# Patient Record
Sex: MALE | Race: WHITE | NOT HISPANIC OR LATINO | Employment: OTHER | URBAN - METROPOLITAN AREA
[De-identification: names, ages, dates, MRNs, and addresses within clinical notes are randomized per-mention and may not be internally consistent; named-entity substitution may affect disease eponyms.]

---

## 2019-11-27 ENCOUNTER — APPOINTMENT (OUTPATIENT)
Dept: RADIOLOGY | Facility: MEDICAL CENTER | Age: 79
DRG: 480 | End: 2019-11-27
Attending: EMERGENCY MEDICINE
Payer: MEDICARE

## 2019-11-27 ENCOUNTER — HOSPITAL ENCOUNTER (INPATIENT)
Facility: MEDICAL CENTER | Age: 79
LOS: 23 days | DRG: 480 | End: 2019-12-20
Attending: EMERGENCY MEDICINE | Admitting: HOSPITALIST
Payer: MEDICARE

## 2019-11-27 DIAGNOSIS — N40.0 BENIGN PROSTATIC HYPERPLASIA, UNSPECIFIED WHETHER LOWER URINARY TRACT SYMPTOMS PRESENT: ICD-10-CM

## 2019-11-27 DIAGNOSIS — I10 ESSENTIAL HYPERTENSION: ICD-10-CM

## 2019-11-27 DIAGNOSIS — D62 ACUTE BLOOD LOSS ANEMIA: ICD-10-CM

## 2019-11-27 DIAGNOSIS — G93.40 ENCEPHALOPATHY ACUTE: ICD-10-CM

## 2019-11-27 DIAGNOSIS — R33.9 URINARY RETENTION: ICD-10-CM

## 2019-11-27 DIAGNOSIS — S72.001A CLOSED FRACTURE OF NECK OF RIGHT FEMUR, INITIAL ENCOUNTER (HCC): ICD-10-CM

## 2019-11-27 PROBLEM — S72.90XA FEMUR FRACTURE (HCC): Status: ACTIVE | Noted: 2019-11-27

## 2019-11-27 PROBLEM — E11.9 DM (DIABETES MELLITUS) (HCC): Status: ACTIVE | Noted: 2019-11-27

## 2019-11-27 LAB
ALBUMIN SERPL BCP-MCNC: 3.6 G/DL (ref 3.2–4.9)
ALBUMIN/GLOB SERPL: 1.2 G/DL
ALP SERPL-CCNC: 60 U/L (ref 30–99)
ALT SERPL-CCNC: 29 U/L (ref 2–50)
ANION GAP SERPL CALC-SCNC: 11 MMOL/L (ref 0–11.9)
AST SERPL-CCNC: 27 U/L (ref 12–45)
BASOPHILS # BLD AUTO: 0.3 % (ref 0–1.8)
BASOPHILS # BLD: 0.02 K/UL (ref 0–0.12)
BILIRUB SERPL-MCNC: 0.6 MG/DL (ref 0.1–1.5)
BUN SERPL-MCNC: 19 MG/DL (ref 8–22)
CALCIUM SERPL-MCNC: 8.5 MG/DL (ref 8.5–10.5)
CHLORIDE SERPL-SCNC: 106 MMOL/L (ref 96–112)
CO2 SERPL-SCNC: 23 MMOL/L (ref 20–33)
CREAT SERPL-MCNC: 1.09 MG/DL (ref 0.5–1.4)
EKG IMPRESSION: NORMAL
EOSINOPHIL # BLD AUTO: 0.18 K/UL (ref 0–0.51)
EOSINOPHIL NFR BLD: 2.8 % (ref 0–6.9)
ERYTHROCYTE [DISTWIDTH] IN BLOOD BY AUTOMATED COUNT: 42.7 FL (ref 35.9–50)
GLOBULIN SER CALC-MCNC: 3.1 G/DL (ref 1.9–3.5)
GLUCOSE BLD-MCNC: 212 MG/DL (ref 65–99)
GLUCOSE SERPL-MCNC: 246 MG/DL (ref 65–99)
HCT VFR BLD AUTO: 46.6 % (ref 42–52)
HGB BLD-MCNC: 15.8 G/DL (ref 14–18)
IMM GRANULOCYTES # BLD AUTO: 0.01 K/UL (ref 0–0.11)
IMM GRANULOCYTES NFR BLD AUTO: 0.2 % (ref 0–0.9)
LYMPHOCYTES # BLD AUTO: 2.4 K/UL (ref 1–4.8)
LYMPHOCYTES NFR BLD: 37.6 % (ref 22–41)
MCH RBC QN AUTO: 31 PG (ref 27–33)
MCHC RBC AUTO-ENTMCNC: 33.9 G/DL (ref 33.7–35.3)
MCV RBC AUTO: 91.6 FL (ref 81.4–97.8)
MONOCYTES # BLD AUTO: 0.7 K/UL (ref 0–0.85)
MONOCYTES NFR BLD AUTO: 11 % (ref 0–13.4)
NEUTROPHILS # BLD AUTO: 3.07 K/UL (ref 1.82–7.42)
NEUTROPHILS NFR BLD: 48.1 % (ref 44–72)
NRBC # BLD AUTO: 0 K/UL
NRBC BLD-RTO: 0 /100 WBC
PLATELET # BLD AUTO: 176 K/UL (ref 164–446)
PMV BLD AUTO: 11.7 FL (ref 9–12.9)
POTASSIUM SERPL-SCNC: 3.9 MMOL/L (ref 3.6–5.5)
PROT SERPL-MCNC: 6.7 G/DL (ref 6–8.2)
RBC # BLD AUTO: 5.09 M/UL (ref 4.7–6.1)
SODIUM SERPL-SCNC: 140 MMOL/L (ref 135–145)
WBC # BLD AUTO: 6.4 K/UL (ref 4.8–10.8)

## 2019-11-27 PROCEDURE — 93005 ELECTROCARDIOGRAM TRACING: CPT | Performed by: EMERGENCY MEDICINE

## 2019-11-27 PROCEDURE — 73502 X-RAY EXAM HIP UNI 2-3 VIEWS: CPT | Mod: LT

## 2019-11-27 PROCEDURE — 700102 HCHG RX REV CODE 250 W/ 637 OVERRIDE(OP): Performed by: HOSPITALIST

## 2019-11-27 PROCEDURE — 80053 COMPREHEN METABOLIC PANEL: CPT

## 2019-11-27 PROCEDURE — 302830 HCHG BUCKS UNIVERSAL TRACTION BOOT

## 2019-11-27 PROCEDURE — 71045 X-RAY EXAM CHEST 1 VIEW: CPT

## 2019-11-27 PROCEDURE — 770006 HCHG ROOM/CARE - MED/SURG/GYN SEMI*

## 2019-11-27 PROCEDURE — 700111 HCHG RX REV CODE 636 W/ 250 OVERRIDE (IP): Performed by: HOSPITALIST

## 2019-11-27 PROCEDURE — 96374 THER/PROPH/DIAG INJ IV PUSH: CPT

## 2019-11-27 PROCEDURE — 99285 EMERGENCY DEPT VISIT HI MDM: CPT

## 2019-11-27 PROCEDURE — 99223 1ST HOSP IP/OBS HIGH 75: CPT | Mod: AI | Performed by: HOSPITALIST

## 2019-11-27 PROCEDURE — 85025 COMPLETE CBC W/AUTO DIFF WBC: CPT

## 2019-11-27 PROCEDURE — A9270 NON-COVERED ITEM OR SERVICE: HCPCS | Performed by: HOSPITALIST

## 2019-11-27 PROCEDURE — 700111 HCHG RX REV CODE 636 W/ 250 OVERRIDE (IP): Performed by: EMERGENCY MEDICINE

## 2019-11-27 PROCEDURE — 96375 TX/PRO/DX INJ NEW DRUG ADDON: CPT

## 2019-11-27 PROCEDURE — 73551 X-RAY EXAM OF FEMUR 1: CPT | Mod: LT

## 2019-11-27 PROCEDURE — 82962 GLUCOSE BLOOD TEST: CPT

## 2019-11-27 RX ORDER — BISACODYL 10 MG
10 SUPPOSITORY, RECTAL RECTAL
Status: DISCONTINUED | OUTPATIENT
Start: 2019-11-27 | End: 2019-12-20 | Stop reason: HOSPADM

## 2019-11-27 RX ORDER — HYDROMORPHONE HYDROCHLORIDE 1 MG/ML
0.5 INJECTION, SOLUTION INTRAMUSCULAR; INTRAVENOUS; SUBCUTANEOUS
Status: DISCONTINUED | OUTPATIENT
Start: 2019-11-27 | End: 2019-12-03

## 2019-11-27 RX ORDER — ONDANSETRON 4 MG/1
4 TABLET, ORALLY DISINTEGRATING ORAL EVERY 4 HOURS PRN
Status: DISCONTINUED | OUTPATIENT
Start: 2019-11-27 | End: 2019-12-20 | Stop reason: HOSPADM

## 2019-11-27 RX ORDER — ONDANSETRON 2 MG/ML
4 INJECTION INTRAMUSCULAR; INTRAVENOUS EVERY 4 HOURS PRN
Status: DISCONTINUED | OUTPATIENT
Start: 2019-11-27 | End: 2019-12-20 | Stop reason: HOSPADM

## 2019-11-27 RX ORDER — POLYETHYLENE GLYCOL 3350 17 G/17G
1 POWDER, FOR SOLUTION ORAL
Status: DISCONTINUED | OUTPATIENT
Start: 2019-11-27 | End: 2019-12-20 | Stop reason: HOSPADM

## 2019-11-27 RX ORDER — AMOXICILLIN 250 MG
2 CAPSULE ORAL 2 TIMES DAILY
Status: DISCONTINUED | OUTPATIENT
Start: 2019-11-27 | End: 2019-12-20 | Stop reason: HOSPADM

## 2019-11-27 RX ORDER — OXYCODONE HYDROCHLORIDE 10 MG/1
10 TABLET ORAL
Status: DISCONTINUED | OUTPATIENT
Start: 2019-11-27 | End: 2019-12-03

## 2019-11-27 RX ORDER — MORPHINE SULFATE 4 MG/ML
4 INJECTION, SOLUTION INTRAMUSCULAR; INTRAVENOUS ONCE
Status: COMPLETED | OUTPATIENT
Start: 2019-11-27 | End: 2019-11-27

## 2019-11-27 RX ORDER — HYDRALAZINE HYDROCHLORIDE 20 MG/ML
10 INJECTION INTRAMUSCULAR; INTRAVENOUS EVERY 6 HOURS PRN
Status: DISCONTINUED | OUTPATIENT
Start: 2019-11-27 | End: 2019-12-20 | Stop reason: HOSPADM

## 2019-11-27 RX ORDER — OXYCODONE HYDROCHLORIDE 5 MG/1
5 TABLET ORAL
Status: DISCONTINUED | OUTPATIENT
Start: 2019-11-27 | End: 2019-12-03

## 2019-11-27 RX ORDER — ONDANSETRON 2 MG/ML
4 INJECTION INTRAMUSCULAR; INTRAVENOUS ONCE
Status: COMPLETED | OUTPATIENT
Start: 2019-11-27 | End: 2019-11-27

## 2019-11-27 RX ADMIN — INSULIN HUMAN 2 UNITS: 100 INJECTION, SOLUTION PARENTERAL at 21:00

## 2019-11-27 RX ADMIN — ONDANSETRON 4 MG: 2 INJECTION INTRAMUSCULAR; INTRAVENOUS at 18:16

## 2019-11-27 RX ADMIN — OXYCODONE HYDROCHLORIDE 10 MG: 10 TABLET ORAL at 20:57

## 2019-11-27 RX ADMIN — HYDROMORPHONE HYDROCHLORIDE 0.5 MG: 1 INJECTION, SOLUTION INTRAMUSCULAR; INTRAVENOUS; SUBCUTANEOUS at 20:00

## 2019-11-27 RX ADMIN — MORPHINE SULFATE 4 MG: 4 INJECTION INTRAVENOUS at 18:16

## 2019-11-27 SDOH — HEALTH STABILITY: MENTAL HEALTH: HOW OFTEN DO YOU HAVE A DRINK CONTAINING ALCOHOL?: NEVER

## 2019-11-27 ASSESSMENT — ENCOUNTER SYMPTOMS
EYE DISCHARGE: 0
BRUISES/BLEEDS EASILY: 0
MYALGIAS: 0
ABDOMINAL PAIN: 0
DIZZINESS: 0
HEMOPTYSIS: 0
DOUBLE VISION: 0
BACK PAIN: 1
SPEECH CHANGE: 0
FLANK PAIN: 0
PALPITATIONS: 0
HEARTBURN: 0
FALLS: 1
BLURRED VISION: 0
HALLUCINATIONS: 0
DEPRESSION: 0
SHORTNESS OF BREATH: 0
FOCAL WEAKNESS: 0
SENSORY CHANGE: 0
CHILLS: 0
NAUSEA: 0
VOMITING: 0
COUGH: 0
FEVER: 0
WEAKNESS: 0

## 2019-11-27 ASSESSMENT — LIFESTYLE VARIABLES: SUBSTANCE_ABUSE: 0

## 2019-11-28 ENCOUNTER — ANESTHESIA EVENT (OUTPATIENT)
Dept: SURGERY | Facility: MEDICAL CENTER | Age: 79
DRG: 480 | End: 2019-11-28
Payer: MEDICARE

## 2019-11-28 ENCOUNTER — APPOINTMENT (OUTPATIENT)
Dept: RADIOLOGY | Facility: MEDICAL CENTER | Age: 79
DRG: 480 | End: 2019-11-28
Attending: ORTHOPAEDIC SURGERY
Payer: MEDICARE

## 2019-11-28 ENCOUNTER — ANESTHESIA (OUTPATIENT)
Dept: SURGERY | Facility: MEDICAL CENTER | Age: 79
DRG: 480 | End: 2019-11-28
Payer: MEDICARE

## 2019-11-28 PROBLEM — I10 DIABETES MELLITUS WITH COINCIDENT HYPERTENSION (HCC): Status: ACTIVE | Noted: 2019-11-27

## 2019-11-28 LAB
ALBUMIN SERPL BCP-MCNC: 3.5 G/DL (ref 3.2–4.9)
ALBUMIN/GLOB SERPL: 1.3 G/DL
ALP SERPL-CCNC: 57 U/L (ref 30–99)
ALT SERPL-CCNC: 28 U/L (ref 2–50)
ANION GAP SERPL CALC-SCNC: 6 MMOL/L (ref 0–11.9)
AST SERPL-CCNC: 25 U/L (ref 12–45)
BASOPHILS # BLD AUTO: 0.2 % (ref 0–1.8)
BASOPHILS # BLD: 0.02 K/UL (ref 0–0.12)
BILIRUB SERPL-MCNC: 0.9 MG/DL (ref 0.1–1.5)
BUN SERPL-MCNC: 20 MG/DL (ref 8–22)
CALCIUM SERPL-MCNC: 8.6 MG/DL (ref 8.5–10.5)
CHLORIDE SERPL-SCNC: 107 MMOL/L (ref 96–112)
CO2 SERPL-SCNC: 27 MMOL/L (ref 20–33)
CREAT SERPL-MCNC: 0.99 MG/DL (ref 0.5–1.4)
EOSINOPHIL # BLD AUTO: 0.01 K/UL (ref 0–0.51)
EOSINOPHIL NFR BLD: 0.1 % (ref 0–6.9)
ERYTHROCYTE [DISTWIDTH] IN BLOOD BY AUTOMATED COUNT: 42.6 FL (ref 35.9–50)
GLOBULIN SER CALC-MCNC: 2.8 G/DL (ref 1.9–3.5)
GLUCOSE BLD-MCNC: 132 MG/DL (ref 65–99)
GLUCOSE BLD-MCNC: 159 MG/DL (ref 65–99)
GLUCOSE BLD-MCNC: 171 MG/DL (ref 65–99)
GLUCOSE BLD-MCNC: 194 MG/DL (ref 65–99)
GLUCOSE BLD-MCNC: 222 MG/DL (ref 65–99)
GLUCOSE SERPL-MCNC: 131 MG/DL (ref 65–99)
HCT VFR BLD AUTO: 43.6 % (ref 42–52)
HGB BLD-MCNC: 14.3 G/DL (ref 14–18)
IMM GRANULOCYTES # BLD AUTO: 0.02 K/UL (ref 0–0.11)
IMM GRANULOCYTES NFR BLD AUTO: 0.2 % (ref 0–0.9)
LYMPHOCYTES # BLD AUTO: 1.36 K/UL (ref 1–4.8)
LYMPHOCYTES NFR BLD: 14.8 % (ref 22–41)
MCH RBC QN AUTO: 30.3 PG (ref 27–33)
MCHC RBC AUTO-ENTMCNC: 32.8 G/DL (ref 33.7–35.3)
MCV RBC AUTO: 92.4 FL (ref 81.4–97.8)
MONOCYTES # BLD AUTO: 1.21 K/UL (ref 0–0.85)
MONOCYTES NFR BLD AUTO: 13.2 % (ref 0–13.4)
NEUTROPHILS # BLD AUTO: 6.56 K/UL (ref 1.82–7.42)
NEUTROPHILS NFR BLD: 71.5 % (ref 44–72)
NRBC # BLD AUTO: 0 K/UL
NRBC BLD-RTO: 0 /100 WBC
PLATELET # BLD AUTO: 162 K/UL (ref 164–446)
PMV BLD AUTO: 11.4 FL (ref 9–12.9)
POTASSIUM SERPL-SCNC: 4.5 MMOL/L (ref 3.6–5.5)
PROT SERPL-MCNC: 6.3 G/DL (ref 6–8.2)
RBC # BLD AUTO: 4.72 M/UL (ref 4.7–6.1)
SODIUM SERPL-SCNC: 140 MMOL/L (ref 135–145)
WBC # BLD AUTO: 9.2 K/UL (ref 4.8–10.8)

## 2019-11-28 PROCEDURE — 700105 HCHG RX REV CODE 258

## 2019-11-28 PROCEDURE — 770006 HCHG ROOM/CARE - MED/SURG/GYN SEMI*

## 2019-11-28 PROCEDURE — 500891 HCHG PACK, ORTHO MAJOR: Performed by: ORTHOPAEDIC SURGERY

## 2019-11-28 PROCEDURE — 700111 HCHG RX REV CODE 636 W/ 250 OVERRIDE (IP): Performed by: ANESTHESIOLOGY

## 2019-11-28 PROCEDURE — 700105 HCHG RX REV CODE 258: Performed by: ANESTHESIOLOGY

## 2019-11-28 PROCEDURE — C1769 GUIDE WIRE: HCPCS | Performed by: ORTHOPAEDIC SURGERY

## 2019-11-28 PROCEDURE — C1713 ANCHOR/SCREW BN/BN,TIS/BN: HCPCS | Performed by: ORTHOPAEDIC SURGERY

## 2019-11-28 PROCEDURE — 160035 HCHG PACU - 1ST 60 MINS PHASE I: Performed by: ORTHOPAEDIC SURGERY

## 2019-11-28 PROCEDURE — 51798 US URINE CAPACITY MEASURE: CPT

## 2019-11-28 PROCEDURE — 700111 HCHG RX REV CODE 636 W/ 250 OVERRIDE (IP): Performed by: HOSPITALIST

## 2019-11-28 PROCEDURE — 160041 HCHG SURGERY MINUTES - EA ADDL 1 MIN LEVEL 4: Performed by: ORTHOPAEDIC SURGERY

## 2019-11-28 PROCEDURE — 36415 COLL VENOUS BLD VENIPUNCTURE: CPT

## 2019-11-28 PROCEDURE — 160002 HCHG RECOVERY MINUTES (STAT): Performed by: ORTHOPAEDIC SURGERY

## 2019-11-28 PROCEDURE — 160029 HCHG SURGERY MINUTES - 1ST 30 MINS LEVEL 4: Performed by: ORTHOPAEDIC SURGERY

## 2019-11-28 PROCEDURE — 160036 HCHG PACU - EA ADDL 30 MINS PHASE I: Performed by: ORTHOPAEDIC SURGERY

## 2019-11-28 PROCEDURE — 99233 SBSQ HOSP IP/OBS HIGH 50: CPT | Performed by: INTERNAL MEDICINE

## 2019-11-28 PROCEDURE — 80053 COMPREHEN METABOLIC PANEL: CPT

## 2019-11-28 PROCEDURE — 700102 HCHG RX REV CODE 250 W/ 637 OVERRIDE(OP): Performed by: ANESTHESIOLOGY

## 2019-11-28 PROCEDURE — A6402 STERILE GAUZE <= 16 SQ IN: HCPCS | Performed by: ORTHOPAEDIC SURGERY

## 2019-11-28 PROCEDURE — 73552 X-RAY EXAM OF FEMUR 2/>: CPT | Mod: LT

## 2019-11-28 PROCEDURE — 160009 HCHG ANES TIME/MIN: Performed by: ORTHOPAEDIC SURGERY

## 2019-11-28 PROCEDURE — 501445 HCHG STAPLER, SKIN DISP: Performed by: ORTHOPAEDIC SURGERY

## 2019-11-28 PROCEDURE — A9270 NON-COVERED ITEM OR SERVICE: HCPCS | Performed by: HOSPITALIST

## 2019-11-28 PROCEDURE — 160048 HCHG OR STATISTICAL LEVEL 1-5: Performed by: ORTHOPAEDIC SURGERY

## 2019-11-28 PROCEDURE — 700101 HCHG RX REV CODE 250: Performed by: ANESTHESIOLOGY

## 2019-11-28 PROCEDURE — 700102 HCHG RX REV CODE 250 W/ 637 OVERRIDE(OP): Performed by: HOSPITALIST

## 2019-11-28 PROCEDURE — 0QS706Z REPOSITION LEFT UPPER FEMUR WITH INTRAMEDULLARY INTERNAL FIXATION DEVICE, OPEN APPROACH: ICD-10-PCS | Performed by: ORTHOPAEDIC SURGERY

## 2019-11-28 PROCEDURE — 700102 HCHG RX REV CODE 250 W/ 637 OVERRIDE(OP): Performed by: INTERNAL MEDICINE

## 2019-11-28 PROCEDURE — 501838 HCHG SUTURE GENERAL: Performed by: ORTHOPAEDIC SURGERY

## 2019-11-28 PROCEDURE — 110371 HCHG SHELL REV 272: Performed by: ORTHOPAEDIC SURGERY

## 2019-11-28 PROCEDURE — 700111 HCHG RX REV CODE 636 W/ 250 OVERRIDE (IP): Performed by: ORTHOPAEDIC SURGERY

## 2019-11-28 PROCEDURE — A9270 NON-COVERED ITEM OR SERVICE: HCPCS | Performed by: ANESTHESIOLOGY

## 2019-11-28 PROCEDURE — 82962 GLUCOSE BLOOD TEST: CPT | Mod: 91

## 2019-11-28 PROCEDURE — 85025 COMPLETE CBC W/AUTO DIFF WBC: CPT

## 2019-11-28 DEVICE — SCREW TRIGEN LOW PROFILE 5.0MM X 42.5MM: Type: IMPLANTABLE DEVICE | Site: FEMUR | Status: FUNCTIONAL

## 2019-11-28 DEVICE — IMPLANTABLE DEVICE: Type: IMPLANTABLE DEVICE | Site: FEMUR | Status: FUNCTIONAL

## 2019-11-28 DEVICE — SCREW TRIGEN LOW PROFILE 5.0MM X 47.5MM: Type: IMPLANTABLE DEVICE | Site: FEMUR | Status: FUNCTIONAL

## 2019-11-28 RX ORDER — SODIUM CHLORIDE 9 MG/ML
INJECTION, SOLUTION INTRAVENOUS
Status: COMPLETED
Start: 2019-11-28 | End: 2019-11-28

## 2019-11-28 RX ORDER — ONDANSETRON 2 MG/ML
4 INJECTION INTRAMUSCULAR; INTRAVENOUS
Status: DISCONTINUED | OUTPATIENT
Start: 2019-11-28 | End: 2019-11-28 | Stop reason: HOSPADM

## 2019-11-28 RX ORDER — HYDROMORPHONE HYDROCHLORIDE 1 MG/ML
0.4 INJECTION, SOLUTION INTRAMUSCULAR; INTRAVENOUS; SUBCUTANEOUS
Status: DISCONTINUED | OUTPATIENT
Start: 2019-11-28 | End: 2019-11-28 | Stop reason: HOSPADM

## 2019-11-28 RX ORDER — SODIUM CHLORIDE, SODIUM LACTATE, POTASSIUM CHLORIDE, CALCIUM CHLORIDE 600; 310; 30; 20 MG/100ML; MG/100ML; MG/100ML; MG/100ML
INJECTION, SOLUTION INTRAVENOUS CONTINUOUS
Status: DISCONTINUED | OUTPATIENT
Start: 2019-11-28 | End: 2019-11-28 | Stop reason: HOSPADM

## 2019-11-28 RX ORDER — DIPHENHYDRAMINE HYDROCHLORIDE 50 MG/ML
12.5 INJECTION INTRAMUSCULAR; INTRAVENOUS
Status: DISCONTINUED | OUTPATIENT
Start: 2019-11-28 | End: 2019-11-28 | Stop reason: HOSPADM

## 2019-11-28 RX ORDER — CEFAZOLIN SODIUM 1 G/3ML
INJECTION, POWDER, FOR SOLUTION INTRAMUSCULAR; INTRAVENOUS PRN
Status: DISCONTINUED | OUTPATIENT
Start: 2019-11-28 | End: 2019-11-28 | Stop reason: SURG

## 2019-11-28 RX ORDER — MIDAZOLAM HYDROCHLORIDE 1 MG/ML
INJECTION INTRAMUSCULAR; INTRAVENOUS PRN
Status: DISCONTINUED | OUTPATIENT
Start: 2019-11-28 | End: 2019-11-28 | Stop reason: SURG

## 2019-11-28 RX ORDER — OXYCODONE HCL 5 MG/5 ML
10 SOLUTION, ORAL ORAL
Status: COMPLETED | OUTPATIENT
Start: 2019-11-28 | End: 2019-11-28

## 2019-11-28 RX ORDER — LABETALOL HYDROCHLORIDE 5 MG/ML
5 INJECTION, SOLUTION INTRAVENOUS
Status: DISCONTINUED | OUTPATIENT
Start: 2019-11-28 | End: 2019-11-28 | Stop reason: HOSPADM

## 2019-11-28 RX ORDER — SODIUM CHLORIDE, SODIUM LACTATE, POTASSIUM CHLORIDE, CALCIUM CHLORIDE 600; 310; 30; 20 MG/100ML; MG/100ML; MG/100ML; MG/100ML
INJECTION, SOLUTION INTRAVENOUS
Status: DISCONTINUED | OUTPATIENT
Start: 2019-11-28 | End: 2019-11-28 | Stop reason: SURG

## 2019-11-28 RX ORDER — HALOPERIDOL 5 MG/ML
1 INJECTION INTRAMUSCULAR
Status: DISCONTINUED | OUTPATIENT
Start: 2019-11-28 | End: 2019-11-28 | Stop reason: HOSPADM

## 2019-11-28 RX ORDER — ROCURONIUM BROMIDE 10 MG/ML
INJECTION, SOLUTION INTRAVENOUS PRN
Status: DISCONTINUED | OUTPATIENT
Start: 2019-11-28 | End: 2019-11-28 | Stop reason: SURG

## 2019-11-28 RX ORDER — CEFAZOLIN SODIUM 1 G/50ML
1 INJECTION, SOLUTION INTRAVENOUS EVERY 8 HOURS
Status: COMPLETED | OUTPATIENT
Start: 2019-11-28 | End: 2019-11-29

## 2019-11-28 RX ORDER — OXYCODONE HCL 5 MG/5 ML
5 SOLUTION, ORAL ORAL
Status: COMPLETED | OUTPATIENT
Start: 2019-11-28 | End: 2019-11-28

## 2019-11-28 RX ORDER — PHENYLEPHRINE HYDROCHLORIDE 10 MG/ML
INJECTION, SOLUTION INTRAMUSCULAR; INTRAVENOUS; SUBCUTANEOUS PRN
Status: DISCONTINUED | OUTPATIENT
Start: 2019-11-28 | End: 2019-11-28 | Stop reason: HOSPADM

## 2019-11-28 RX ORDER — LIDOCAINE HYDROCHLORIDE 20 MG/ML
INJECTION, SOLUTION EPIDURAL; INFILTRATION; INTRACAUDAL; PERINEURAL PRN
Status: DISCONTINUED | OUTPATIENT
Start: 2019-11-28 | End: 2019-11-28 | Stop reason: SURG

## 2019-11-28 RX ORDER — HYDROMORPHONE HYDROCHLORIDE 1 MG/ML
0.1 INJECTION, SOLUTION INTRAMUSCULAR; INTRAVENOUS; SUBCUTANEOUS
Status: DISCONTINUED | OUTPATIENT
Start: 2019-11-28 | End: 2019-11-28 | Stop reason: HOSPADM

## 2019-11-28 RX ORDER — HYDROMORPHONE HYDROCHLORIDE 1 MG/ML
0.2 INJECTION, SOLUTION INTRAMUSCULAR; INTRAVENOUS; SUBCUTANEOUS
Status: DISCONTINUED | OUTPATIENT
Start: 2019-11-28 | End: 2019-11-28 | Stop reason: HOSPADM

## 2019-11-28 RX ADMIN — PROPOFOL 80 MG: 10 INJECTION, EMULSION INTRAVENOUS at 07:37

## 2019-11-28 RX ADMIN — ONDANSETRON 4 MG: 2 INJECTION INTRAMUSCULAR; INTRAVENOUS at 07:37

## 2019-11-28 RX ADMIN — INSULIN HUMAN 1 UNITS: 100 INJECTION, SOLUTION PARENTERAL at 21:47

## 2019-11-28 RX ADMIN — CEFAZOLIN SODIUM 1 G: 1 INJECTION, SOLUTION INTRAVENOUS at 21:39

## 2019-11-28 RX ADMIN — CEFAZOLIN SODIUM 1 G: 1 INJECTION, SOLUTION INTRAVENOUS at 16:34

## 2019-11-28 RX ADMIN — MIDAZOLAM 2 MG: 1 INJECTION INTRAMUSCULAR; INTRAVENOUS at 07:37

## 2019-11-28 RX ADMIN — INSULIN HUMAN 1 UNITS: 100 INJECTION, SOLUTION PARENTERAL at 13:26

## 2019-11-28 RX ADMIN — SODIUM CHLORIDE, POTASSIUM CHLORIDE, SODIUM LACTATE AND CALCIUM CHLORIDE: 600; 310; 30; 20 INJECTION, SOLUTION INTRAVENOUS at 08:51

## 2019-11-28 RX ADMIN — INSULIN HUMAN 1 UNITS: 100 INJECTION, SOLUTION PARENTERAL at 01:42

## 2019-11-28 RX ADMIN — CEFAZOLIN 2 G: 330 INJECTION, POWDER, FOR SOLUTION INTRAMUSCULAR; INTRAVENOUS at 07:37

## 2019-11-28 RX ADMIN — SODIUM CHLORIDE 500 ML: 9 INJECTION, SOLUTION INTRAVENOUS at 21:45

## 2019-11-28 RX ADMIN — FENTANYL CITRATE 25 MCG: 0.05 INJECTION, SOLUTION INTRAMUSCULAR; INTRAVENOUS at 09:11

## 2019-11-28 RX ADMIN — ROCURONIUM BROMIDE 40 MG: 10 INJECTION, SOLUTION INTRAVENOUS at 07:37

## 2019-11-28 RX ADMIN — SODIUM CHLORIDE, POTASSIUM CHLORIDE, SODIUM LACTATE AND CALCIUM CHLORIDE: 600; 310; 30; 20 INJECTION, SOLUTION INTRAVENOUS at 07:30

## 2019-11-28 RX ADMIN — OXYCODONE HYDROCHLORIDE 10 MG: 10 TABLET ORAL at 13:25

## 2019-11-28 RX ADMIN — PHENYLEPHRINE HYDROCHLORIDE 100 MCG: 10 INJECTION INTRAVENOUS at 07:52

## 2019-11-28 RX ADMIN — FENTANYL CITRATE 25 MCG: 0.05 INJECTION, SOLUTION INTRAMUSCULAR; INTRAVENOUS at 09:26

## 2019-11-28 RX ADMIN — LIDOCAINE HYDROCHLORIDE 100 MG: 20 INJECTION, SOLUTION EPIDURAL; INFILTRATION; INTRACAUDAL at 07:37

## 2019-11-28 RX ADMIN — SUGAMMADEX 200 MG: 100 INJECTION, SOLUTION INTRAVENOUS at 08:51

## 2019-11-28 RX ADMIN — HYDROMORPHONE HYDROCHLORIDE 0.2 MG: 1 INJECTION, SOLUTION INTRAMUSCULAR; INTRAVENOUS; SUBCUTANEOUS at 09:45

## 2019-11-28 RX ADMIN — FENTANYL CITRATE 100 MCG: 50 INJECTION, SOLUTION INTRAMUSCULAR; INTRAVENOUS at 07:37

## 2019-11-28 RX ADMIN — SENNOSIDES AND DOCUSATE SODIUM 2 TABLET: 8.6; 5 TABLET ORAL at 18:42

## 2019-11-28 RX ADMIN — FENTANYL CITRATE 50 MCG: 50 INJECTION, SOLUTION INTRAMUSCULAR; INTRAVENOUS at 09:36

## 2019-11-28 RX ADMIN — OXYCODONE HYDROCHLORIDE 5 MG: 5 SOLUTION ORAL at 09:15

## 2019-11-28 RX ADMIN — INSULIN HUMAN 2 UNITS: 100 INJECTION, SOLUTION PARENTERAL at 18:42

## 2019-11-28 RX ADMIN — EPHEDRINE SULFATE 25 MG: 50 INJECTION, SOLUTION INTRAVENOUS at 07:48

## 2019-11-28 RX ADMIN — EPHEDRINE SULFATE 10 MG: 50 INJECTION, SOLUTION INTRAVENOUS at 07:45

## 2019-11-28 RX ADMIN — SODIUM CHLORIDE, POTASSIUM CHLORIDE, SODIUM LACTATE AND CALCIUM CHLORIDE: 600; 310; 30; 20 INJECTION, SOLUTION INTRAVENOUS at 09:30

## 2019-11-28 ASSESSMENT — ENCOUNTER SYMPTOMS
EYE PAIN: 0
DIARRHEA: 0
SEIZURES: 0
FEVER: 0
ORTHOPNEA: 0
EYE DISCHARGE: 0
NECK PAIN: 0
CHILLS: 0
HEADACHES: 0
DIZZINESS: 0
ABDOMINAL PAIN: 0
FALLS: 1
SHORTNESS OF BREATH: 0
INSOMNIA: 0
BACK PAIN: 0
STRIDOR: 0
PALPITATIONS: 0
VOMITING: 0
NERVOUS/ANXIOUS: 0
WEIGHT LOSS: 0
SPUTUM PRODUCTION: 0
COUGH: 0
FOCAL WEAKNESS: 0
EYE REDNESS: 0
BLURRED VISION: 0
MYALGIAS: 0
DEPRESSION: 0
HEARTBURN: 0
NAUSEA: 0

## 2019-11-28 ASSESSMENT — PAIN SCALES - GENERAL: PAIN_LEVEL: 3

## 2019-11-28 ASSESSMENT — COGNITIVE AND FUNCTIONAL STATUS - GENERAL
TOILETING: A LOT
MOVING TO AND FROM BED TO CHAIR: UNABLE
TURNING FROM BACK TO SIDE WHILE IN FLAT BAD: UNABLE
DRESSING REGULAR UPPER BODY CLOTHING: A LITTLE
WALKING IN HOSPITAL ROOM: TOTAL
MOBILITY SCORE: 6
MOVING FROM LYING ON BACK TO SITTING ON SIDE OF FLAT BED: UNABLE
DRESSING REGULAR LOWER BODY CLOTHING: TOTAL
CLIMB 3 TO 5 STEPS WITH RAILING: TOTAL
HELP NEEDED FOR BATHING: TOTAL
SUGGESTED CMS G CODE MODIFIER DAILY ACTIVITY: CK
SUGGESTED CMS G CODE MODIFIER MOBILITY: CN
STANDING UP FROM CHAIR USING ARMS: TOTAL
DAILY ACTIVITIY SCORE: 15

## 2019-11-28 ASSESSMENT — PATIENT HEALTH QUESTIONNAIRE - PHQ9
SUM OF ALL RESPONSES TO PHQ9 QUESTIONS 1 AND 2: 0
2. FEELING DOWN, DEPRESSED, IRRITABLE, OR HOPELESS: NOT AT ALL
1. LITTLE INTEREST OR PLEASURE IN DOING THINGS: NOT AT ALL

## 2019-11-28 ASSESSMENT — LIFESTYLE VARIABLES
ON A TYPICAL DAY WHEN YOU DRINK ALCOHOL HOW MANY DRINKS DO YOU HAVE: 0
HOW MANY TIMES IN THE PAST YEAR HAVE YOU HAD 5 OR MORE DRINKS IN A DAY: 0
AVERAGE NUMBER OF DAYS PER WEEK YOU HAVE A DRINK CONTAINING ALCOHOL: 0
CONSUMPTION TOTAL: NEGATIVE
TOTAL SCORE: 0
DOES PATIENT WANT TO STOP DRINKING: NO
HAVE PEOPLE ANNOYED YOU BY CRITICIZING YOUR DRINKING: NO
ALCOHOL_USE: NO
EVER FELT BAD OR GUILTY ABOUT YOUR DRINKING: NO
HAVE YOU EVER FELT YOU SHOULD CUT DOWN ON YOUR DRINKING: NO
EVER HAD A DRINK FIRST THING IN THE MORNING TO STEADY YOUR NERVES TO GET RID OF A HANGOVER: NO
TOTAL SCORE: 0
TOTAL SCORE: 0

## 2019-11-28 NOTE — ASSESSMENT & PLAN NOTE
Continue tamsulosin and finasteride   Continue shah   He will need to follow up with urology in [place]

## 2019-11-28 NOTE — ANESTHESIA POSTPROCEDURE EVALUATION
Patient: Nazario Cox    Procedure Summary     Date:  11/28/19 Room / Location:  [place] [address] / SURGERY [facility]    Anesthesia Start:  0730 Anesthesia Stop:  0859    Procedure:  INSERTION, INTRAMEDULLARY SELINA, FEMUR (Left Leg Upper) Diagnosis:  (Left subtrochanteric femur fracture)    Surgeon:  Timoteo Reyes M.D. Responsible Provider:  Rick Blanca M.D.    Anesthesia Type:  general ASA Status:  2          Final Anesthesia Type: general  Last vitals  BP   Blood Pressure : 143/97    Temp   36.2 °C (97.2 °F)    Pulse   Pulse: (!) 101   Resp   (!) 10    SpO2   96 %      Anesthesia Post Evaluation    Patient location during evaluation: PACU  Patient participation: complete - patient participated  Level of consciousness: awake and alert  Pain score: 3    Airway patency: patent  Anesthetic complications: no  Cardiovascular status: hemodynamically stable  Respiratory status: acceptable  Hydration status: euvolemic    PONV: none           Nurse Pain Score: 3 (NPRS)

## 2019-11-28 NOTE — ED NOTES
UNABLE to complete med rec  Pt does not know any of his medications or what pharmacy he uses.  No pharmacy, friends or family in demographics.  Will attempt to re interview or follow up with demographics if they are updated tomorrow 11/27

## 2019-11-28 NOTE — CARE PLAN
Problem: Pain Management  Goal: Pain level will decrease to patient's comfort goal  Outcome: PROGRESSING AS EXPECTED     Problem: Skin Integrity  Goal: Risk for impaired skin integrity will decrease  Outcome: PROGRESSING AS EXPECTED     Pain managed well with Hein's traction. Skin assessed frequently under devices.

## 2019-11-28 NOTE — ANESTHESIA QCDR
2019 [facility] Clinical Data Registry (for Quality Improvement)     Postoperative nausea/vomiting risk protocol (Adult = 18 yrs and Pediatric 3-17 yrs)- (430 and 463)  General inhalation anesthetic (NOT TIVA) with PONV risk factors: No  Provision of anti-emetic therapy with at least 2 different classes of agents: N/A  Patient DID NOT receive anti-emetic therapy and reason is documented in Medical Record: N/A    Multimodal Pain Management- (AQI59)  Patient undergoing Elective Surgery (i.e. Outpatient, or ASC, or Prescheduled Surgery prior to Hospital Admission): No  Use of Multimodal Pain Management, two or more drugs and/or interventions, NOT including systemic opioids: N/A  Exception: Documented allergy to multiple classes of analgesics: N/A    PACU assessment of acute postoperative pain prior to Anesthesia Care End- Applies to Patients Age = 18- (ABG7)  Initial PACU pain score is which of the following: < 7/10  Patient unable to report pain score: N/A    Post-anesthetic transfer of care checklist/protocol to PACU/ICU- (426 and 427)  Upon conclusion of case, patient transferred to which of the following locations: PACU/Non-ICU  Use of transfer checklist/protocol: Yes  Exclusion: Service Performed in Patient Hospital Room (and thus did not require transfer): N/A    PACU Reintubation- (AQI31)  General anesthesia requiring endotracheal intubation (ETT) along with subsequent extubation in OR or PACU: No  Required reintubation in the PACU: N/A  Extubation was a planned trial documented in the medical record prior to removal of the original airway device: N/A    Unplanned admission to ICU related to anesthesia service up through end of PACU care- (MD51)  Unplanned admission to ICU (not initially anticipated at anesthesia start time): No

## 2019-11-28 NOTE — ED PROVIDER NOTES
ED Provider Note    Scribed for Dr. Sanju De La Cruz M.D. by Wendy Chavez. 11/27/2019  6:06 PM    Primary care provider: None noted.  Means of arrival: Wheel-Chair  History obtained from: Patient  History limited by: None    CHIEF COMPLAINT  Chief Complaint   Patient presents with   • Hip Injury     c/o left hip pain   • T-5000 GLF     patient slipped on ice and landed on left hip       HPI  Nazario Cox is a 79 y.o. male who presents to the Emergency Department for evaluation of injuries sustained when he fell on his left hip prior to arrival. Patient notes he was getting out of his car when he slipped on ice and fell on his left hip. He states that when he was unable to get up because his entire left leg was not supporting him. He is currently complaining of moderate left hip pain and left lower extremity pain. He states his pain is exacerbated with movement. No additional pain or symptoms noted at this time.    REVIEW OF SYSTEMS  Pertinent positives include left hip and left lower extremity pain. Pertinent negatives include: No additional pain or symptoms noted at this time.   See HPI for further details.     PAST MEDICAL HISTORY   has a past medical history of Diabetes (HCC) and Hypertension.    SURGICAL HISTORY  patient denies any surgical history    SOCIAL HISTORY  Social History     Tobacco Use   • Smoking status: Current Every Day Smoker     Packs/day: 0.00     Types: Cigarettes   • Smokeless tobacco: Never Used   Substance Use Topics   • Alcohol use: Never     Frequency: Never   • Drug use: Yes     Types: Inhaled     Comment: marijuana      Social History     Substance and Sexual Activity   Drug Use Yes   • Types: Inhaled    Comment: marijuana       FAMILY HISTORY  History reviewed. No pertinent family history.    CURRENT MEDICATIONS  None noted.    ALLERGIES  No reported allergies.     PHYSICAL EXAM  VITAL SIGNS: /86   Pulse 92   Temp 36 °C (96.8 °F) (Temporal)   Resp 16   Wt 74.8 kg (165 lb)    SpO2 92%     Constitutional: Well developed, Well nourished, moderate distress, Non-toxic appearance.   HENT: Normocephalic, Atraumatic, Bilateral external ears normal, Oropharynx moist, No oral exudates.   Eyes: PERRLA, EOMI, Conjunctiva normal, No discharge.   Neck: No tenderness, Supple, No stridor.   Lymphatic: No lymphadenopathy noted.   Cardiovascular: Normal heart rate, Normal rhythm.   Thorax & Lungs: Clear to auscultation bilaterally, No respiratory distress, No wheezing, No crackles.   Abdomen: Soft, No tenderness, No masses, No pulsatile masses.   Skin: Warm, Dry, No erythema, No rash.   Extremities: Shortening of left leg, pain with movement and tenderness at the hip and down to mid thigh. Leg is rotated abnormality.   Musculoskeletal: Intact distal pulses  Neurologic: Awake, alert. Moves all extremities spontaneously.  Psychiatric: Affect normal, Judgment normal, Mood normal.     RADIOLOGY  DX-CHEST-PORTABLE (1 VIEW)   Final Result      No acute cardiopulmonary abnormality. No displaced rib fractures. No pneumothorax.      DX-HIP-COMPLETE - UNILATERAL 2+ LEFT   Final Result      1.  Mild/moderately displaced subtrochanteric proximal left femur fracture.   2.  Degenerative changes of the hips.      DX-FEMUR-1 VIEW LEFT   Final Result      Acute, obliquely oriented fracture of subtrochanteric femoral diaphysis.        The radiologist's interpretation of all radiological studies have been reviewed by me.    COURSE & MEDICAL DECISION MAKING  Pertinent Labs & Imaging studies reviewed. (See chart for details)    6:06 PM - Patient seen and examined at bedside. I informed the patient the need for radiology to rule out any emergent processes. Currently awaiting results before deciding if intervention is necessary. Patient verbalizes understanding and agreement to this plan of care.  Patient will be treated with Zofran injection 4 mg and morphine 4 mg/mL injection 4mg. Ordered DX-femur left to evaluate his  symptoms. The differential diagnoses include but are not limited to: pelvic fracture, intertrochanter hip fracture, and femur fracture    7:18 PM - Paged orthopedics.    7:26 PM - I discussed the patient's case and the above findings with Dr. Reyes (Orthopedics) who agreed to consult the patient.     7:35 PM - Paged Hospitalist.    7:40 PM - I discussed the patient's case and the above findings with Dr. Pacheco (Hospitalist) who agreed to evaluate patient for hospitalization.    Decision Making:  Patient with a subtrochanteric hip fracture with extension down to the proximal femur.  Patient will be discussed with Dr. Reyes to be kept n.p.o. after midnight Hein's traction r requested by traction   dISPOSITION:  Patient will be hospitalized by Dr. Pacheco (Hospitalist) in guarded condition.,    FINAL IMPRESSION  Subtrochanteric hip fracture     Wendy CRUMP (Shu), am scribing for, and in the presence of, Sanju De La Cruz M.D..    Electronically signed by: Wendy Chavez (Shu), 11/27/2019    ISanju M.D. personally performed the services described in this documentation, as scribed by Wendy Chavez in my presence, and it is both accurate and complete. E.    The note accurately reflects work and decisions made by me.  Sanju De La Cruz  11/27/2019  9:47 PM

## 2019-11-28 NOTE — ED TRIAGE NOTES
.Nazario Cox  .  Chief Complaint   Patient presents with   • Hip Injury     c/o left hip pain   • T-5000 GLF     patient slipped on ice and landed on left hip     Patient to triage with above complaint. Left foot appears rotated. Charge RN aware and patient to green 24.

## 2019-11-28 NOTE — ANESTHESIA PREPROCEDURE EVALUATION
Relevant Problems   CARDIAC   (+) HTN (hypertension)       Physical Exam    Airway   Mallampati: II  TM distance: >3 FB  Neck ROM: full       Cardiovascular - normal exam  Rhythm: regular  Rate: normal  (-) murmur     Dental - normal exam  (+) upper dentures, lower dentures         Pulmonary - normal exam  Breath sounds clear to auscultation     Abdominal    Neurological - normal exam               Anesthesia Plan    ASA 2       Plan - general       Airway plan will be ETT        Induction: intravenous    Postoperative Plan: Postoperative administration of opioids is intended.    Pertinent diagnostic labs and testing reviewed    Informed Consent:    Anesthetic plan and risks discussed with patient.    Use of blood products discussed with: patient whom consented to blood products.

## 2019-11-28 NOTE — PROGRESS NOTES
Hospital Medicine Daily Progress Note    Date of Service  11/28/2019    Chief Complaint  79 y.o. male admitted 11/27/2019 with GLF, hip pain    Hospital Course    79 y.o. male who presented 11/27/2019 with past medical history of diabetes, hypertension who presents with left hip pain and found to have femromal fracture.      Interval Problem Update  POD 0. Having left hip pain at surgical site. PT/OT. Pain control.  Patient was seen and examined by me today. Case was discussed with RN and multidisplinary team during rounds. Denies nausea, vomiting, diarrhea.       Consultants/Specialty  ortho    Code Status  fuoll    Disposition  Remain on the floor    Review of Systems  Review of Systems   Constitutional: Negative for chills, fever and weight loss.   HENT: Negative for congestion and nosebleeds.    Eyes: Negative for blurred vision, pain, discharge and redness.   Respiratory: Negative for cough, sputum production, shortness of breath and stridor.    Cardiovascular: Negative for chest pain, palpitations and orthopnea.   Gastrointestinal: Negative for abdominal pain, diarrhea, heartburn, nausea and vomiting.   Genitourinary: Negative for dysuria, frequency and urgency.   Musculoskeletal: Positive for falls and joint pain. Negative for back pain, myalgias and neck pain.        Left hip pain   Skin: Negative for itching and rash.   Neurological: Negative for dizziness, focal weakness, seizures and headaches.   Psychiatric/Behavioral: Negative for depression. The patient is not nervous/anxious and does not have insomnia.         Physical Exam  Temp:  [36 °C (96.8 °F)-36.8 °C (98.3 °F)] 36.8 °C (98.3 °F)  Pulse:  [58-92] 83  Resp:  [16-18] 16  BP: ()/(63-97) 142/93  SpO2:  [92 %-99 %] 96 %    Physical Exam  Vitals signs reviewed.   Constitutional:       General: He is not in acute distress.     Appearance: Normal appearance.   HENT:      Head: Normocephalic and atraumatic.      Nose: No congestion or rhinorrhea.    Eyes:      Extraocular Movements: Extraocular movements intact.      Pupils: Pupils are equal, round, and reactive to light.   Neck:      Musculoskeletal: Normal range of motion and neck supple.   Cardiovascular:      Rate and Rhythm: Normal rate and regular rhythm.      Pulses: Normal pulses.   Pulmonary:      Effort: Pulmonary effort is normal. No respiratory distress.      Breath sounds: Normal breath sounds.   Abdominal:      General: Bowel sounds are normal. There is no distension.      Palpations: Abdomen is soft.      Tenderness: There is no tenderness.   Musculoskeletal:         General: Tenderness present. No swelling.      Comments: Left hip pain   Skin:     General: Skin is warm.      Findings: No erythema.   Neurological:      General: No focal deficit present.      Mental Status: He is alert and oriented to person, place, and time.         Fluids    Intake/Output Summary (Last 24 hours) at 11/28/2019 0840  Last data filed at 11/28/2019 0500  Gross per 24 hour   Intake --   Output 380 ml   Net -380 ml       Laboratory  Recent Labs     11/27/19  1815 11/28/19  0450   WBC 6.4 9.2   RBC 5.09 4.72   HEMOGLOBIN 15.8 14.3   HEMATOCRIT 46.6 43.6   MCV 91.6 92.4   MCH 31.0 30.3   MCHC 33.9 32.8*   RDW 42.7 42.6   PLATELETCT 176 162*   MPV 11.7 11.4     Recent Labs     11/27/19  1815 11/28/19  0450   SODIUM 140 140   POTASSIUM 3.9 4.5   CHLORIDE 106 107   CO2 23 27   GLUCOSE 246* 131*   BUN 19 20   CREATININE 1.09 0.99   CALCIUM 8.5 8.6                   Imaging  DX-FEMUR-2+ LEFT   Final Result      Intraoperative evaluation of femur fracture fixation.      DX-PORTABLE FLUORO > 1 HOUR   Final Result      Intraoperative evaluation of femur fracture fixation.      DX-CHEST-PORTABLE (1 VIEW)   Final Result      No acute cardiopulmonary abnormality. No displaced rib fractures. No pneumothorax.      DX-HIP-COMPLETE - UNILATERAL 2+ LEFT   Final Result      1.  Mild/moderately displaced subtrochanteric proximal left  femur fracture.   2.  Degenerative changes of the hips.      DX-FEMUR-1 VIEW LEFT   Final Result      Acute, obliquely oriented fracture of subtrochanteric femoral diaphysis.           Assessment/Plan  * Femur fracture (HCC)  Assessment & Plan  Left sided subtrochanteric femur fracture   POD 0 IMN  Cont with pain control   Pt/ot eval as appropriate        Marijuana use, continuous- (present on admission)  Assessment & Plan  Daily use     BPH (benign prostatic hyperplasia)  Assessment & Plan  Unknown home medications, cont to monitor for now prn bladder scan as appropriate    Diabetes mellitus with coincident hypertension (HCC)  Assessment & Plan  With associated hyperglycemia and uncontrolled  SSI ordered   accu checks      Essential hypertension  Assessment & Plan  Unknown home medications at this time   Prn hydralazine ordered        VTE prophylaxis: lovenox

## 2019-11-28 NOTE — PROGRESS NOTES
2 RN Skin Check    2 RN skin check complete.   Devices in place: Nasal Cannula, Hein's Traction.  Skin assessed under devices: Yes.  Confirmed pressure ulcers found on: N/A.  New potential pressure ulcers noted on N/A. Wound consult placed N/A.  The following interventions in place Pillows.    Patient with obvious deformity to left thigh, swollen but skin intact. Heels and sacrum intact, no redness or open wounds. No current skin concerns.

## 2019-11-28 NOTE — ASSESSMENT & PLAN NOTE
Pt has no diabetes. He has prediabetes. a1c 5.7   He is 80 y/o. no need for metformin which has more side effects instead of benefits   Discontinue accu-check  Diet controled

## 2019-11-28 NOTE — ANESTHESIA TIME REPORT
Anesthesia Start and Stop Event Times     Date Time Event    11/28/2019 0717 Ready for Procedure     0730 Anesthesia Start     0859 Anesthesia Stop        Responsible Staff  11/28/19    Name Role Begin End    Rick Blanca M.D. Anesth 0730 0859        Preop Diagnosis (Free Text):  Pre-op Diagnosis     Left subtrochanteric femur fracture        Preop Diagnosis (Codes):    Post op Diagnosis  Femur fracture (HCC)      Premium Reason  F. Holiday    Comments:

## 2019-11-28 NOTE — ANESTHESIA PROCEDURE NOTES
Airway  Date/Time: 11/28/2019 7:38 AM  Performed by: Rick Blanca M.D.  Authorized by: Rick Blanca M.D.     Location:  OR  Urgency:  Elective  Indications for Airway Management:  Anesthesia  Spontaneous Ventilation: absent    Sedation Level:  Deep  Preoxygenated: Yes    Patient Position:  Sniffing  Final Airway Type:  Endotracheal airway  Final Endotracheal Airway:  ETT  Cuffed: Yes    Technique Used for Successful ETT Placement:  Direct laryngoscopy  Insertion Site:  Oral  Blade Type:  Hernadez  Laryngoscope Blade/Videolaryngoscope Blade Size:  2  ETT Size (mm):  8.0  Measured from:  Lips  ETT to Lips (cm):  23  Placement Verified by: auscultation and capnometry    Cormack-Lehane Classification:  Grade I - full view of glottis  Number of Attempts at Approach:  1

## 2019-11-28 NOTE — PROGRESS NOTES
Additional 5lbs applied to pt's bucks traction. Any further questions or assistance please [place] at [phone].

## 2019-11-28 NOTE — OP REPORT
DATE OF SERVICE:  11/28/2019    PREOPERATIVE DIAGNOSIS:  Left subtrochanteric femur fracture, closed,   displaced, spiral.    POSTOPERATIVE DIAGNOSIS:  Left subtrochanteric femur fracture, closed,   displaced, spiral.    SURGICAL PROCEDURES:  Intramedullary nailing of left femur.    SURGEON:  Timoteo Reyes MD    ASSISTANT:  Kamran Carroll PA-C    ANESTHESIOLOGIST:  Rick Blanca MD    ANESTHETIC:  General.    ESTIMATED BLOOD LOSS:  250 mL    INDICATIONS:  The patient is 79 years old, was traveling through [place]   yesterday, went to the gas station, slipped on some ice and fell injuring his   left hip.  He sustained a spiral displaced left subtrochanteric femur fracture   and was admitted to the hospitalist service.  I recommended intramedullary   nailing.  Risks include bleeding, infection, neurovascular injury, pain,   stiffness, nonunion, malunion, fracture, arthritis, thromboembolic phenomena,   anesthetic and medical complications, etc.    DESCRIPTION OF PROCEDURE:  The patient was identified in the preoperative   holding area.  His left leg was marked.  He was taken to the operating room   where general anesthetic was administered.  Intravenous antibiotics were   given.  He was placed supine on the fracture table.  Feet were padded and   placed within the boots.  Traction and internal rotation were applied to left   lower extremity.  Fluoroscopic images showed nearly 100% posterior   displacement of the shaft.  The lateral hip, thigh, and knee were then prepped   and draped in sterile fashion.  Time-out was held to confirm the patient   identity and correct surgical site.    I made a longitudinal incision along the lateral aspect of the thigh, carried   this down to the IT band, which was split longitudinally.  Then, I split the   vastus lateralis fascia.  I encountered a fairly large hematoma related to the   fracture.  I then elevated the vastus lateralis to expose the fracture   partially.  I  then used 2 large clamps to reduce the fracture and hold it.    Visibly and fluoroscopically, it was near anatomic.    I then inserted a terminally threaded guidewire to the tip of the greater   trochanter and then inserted this into the proximal femur with the appropriate   starting angle.  Incision was made around the wire and then the channel   reamer was used over the wire down to the level of lesser trochanter.  I then   passed the ball tip guide ankit down the medullary canal and measured the   length.  I reamed up to 13 mm where there was a little bit of contact.  I then   inserted a [employer] 380x11.5 mm Intertan nail (125 degrees).  The   guidewire was inserted through the interlocking sleeve into the near   center-center position of the femoral head.  Then, the cannulated drill was   used over this and then a 95 mm lag screw was inserted.  The setscrew was   tightened.  The jig was removed.  Fluoroscopic images showed good implant   placement proximally.  I left the clamps in place.  I then locked the nail   distally with 2 interlocking screws after stab incision and predrilling.    Then, the clamps were removed.  Fluoroscopic images showed good reduction and   implant placement.  Wounds were irrigated.  The IT band was closed with #1   Vicryl.  The skin incisions were closed with 2-0 Vicryl and staples.    Dressings were applied.  The patient's legs were taken out of the fracture   attachments.  We noted approximately equal leg lengths and symmetric rotation.    He was then extubated and taken to the recovery room in stable condition.    POSTOPERATIVE PLAN:  1.  Intravenous antibiotics for 24 hours.  2.  DVT prophylaxis with SCDs, Lovenox, and early mobilization.  3.  Weightbearing as tolerated.  4.  Ongoing preoperative medical management per hospitalist.  5.  Follow up with local orthopedic surgeon in 10-14 days for wound check,   baseline radiographs, and staple removal or in [place] if the patient  is still   here.       ____________________________________     MD POPPY KELLY / FEDERICO    DD:  11/28/2019 09:05:14  DT:  11/28/2019 09:19:16    D#:  [id number]  Job#:  [id number]

## 2019-11-28 NOTE — OR NURSING
Report called to RNPayal. All pertinent events, medical information and care plan details reported to receiving RN. Reviewed lines and drains including PIV & fluids received in PACU. Reviewed hemodynamics, allergies, code status, applicable medications including pain medications given, and pertinent assessment findings including LLE neuromuscular and surgical site assessment. Surgical dressing of xeroform, 4x4 and medipore tape reviewed with receiving RN. Dressing demonstrates scant spots of sanguinous drainage length of dressing. All questions and concerns addressed. Patient remains HDS on 2L NC, AOx3 at this time. Pt tolerating PO liquids. Patient educated on next phase of care.    This RN unable to update family. No family listed in demographics & no number on yellow surgical family sheet.

## 2019-11-28 NOTE — PROGRESS NOTES
Per Dr. Reyes and Dr. De La Cruz, 5lbs bucks traction has been applied to pt's L LE. Any further questions or assistance please call ortho tech at [phone].

## 2019-11-28 NOTE — ASSESSMENT & PLAN NOTE
Unknown home medications at this time \  BP stable  Prn hydralazine ordered   Doing well without med.   Continue to monitor

## 2019-11-28 NOTE — H&P
Hospital Medicine History & Physical Note    Date of Service  11/27/2019    Primary Care Physician  No primary care provider on file.    Consultants  ortho    Code Status  full    Chief Complaint  GLF, hip pain     History of Presenting Illness  79 y.o. male who presented 11/27/2019 with past medical history of diabetes, hypertension who presents with left hip pain.  This patient was getting out of his car he slipped on ice and landed on his left hip.  He was unable to bear weight on his left side due to significant amount of pain.  His pain is worse with any movement.  Alleviated by rest.  He has moderate hip pain at the time of examination.  In the emergency department is found to have a femoral fracture and will be admitted to the hospital for further management of his symptoms.  Otherwise he has no known alleviating or exacerbating factors to his symptoms.    Review of Systems  Review of Systems   Constitutional: Positive for malaise/fatigue. Negative for chills and fever.   HENT: Negative for congestion, hearing loss and tinnitus.    Eyes: Negative for blurred vision, double vision and discharge.   Respiratory: Negative for cough, hemoptysis and shortness of breath.    Cardiovascular: Negative for chest pain, palpitations and leg swelling.   Gastrointestinal: Negative for abdominal pain, heartburn, nausea and vomiting.   Genitourinary: Negative for dysuria and flank pain.   Musculoskeletal: Positive for back pain, falls and joint pain. Negative for myalgias.   Skin: Negative for rash.   Neurological: Negative for dizziness, sensory change, speech change, focal weakness and weakness.   Endo/Heme/Allergies: Negative for environmental allergies. Does not bruise/bleed easily.   Psychiatric/Behavioral: Negative for depression, hallucinations and substance abuse.       Past Medical History   has a past medical history of Diabetes (HCC) and Hypertension.    Surgical History  Reviewed and not pertinent     Family  History  Reviewed and not pertinent      Social History   reports that he has been smoking cigarettes. He has been smoking about 0.00 packs per day. He has never used smokeless tobacco. He reports current drug use. Drug: Inhaled. He reports that he does not drink alcohol.    Allergies  Not on File    Medications  None       Physical Exam  Temp:  [36 °C (96.8 °F)] 36 °C (96.8 °F)  Pulse:  [58-92] 78  Resp:  [16] 16  BP: ()/(63-86) 125/77  SpO2:  [92 %-98 %] 94 %    Physical Exam  Vitals signs reviewed.   Constitutional:       General: He is in acute distress.      Appearance: He is not ill-appearing.   HENT:      Head: Normocephalic and atraumatic.      Nose: No congestion.      Mouth/Throat:      Mouth: Mucous membranes are moist.   Eyes:      Extraocular Movements: Extraocular movements intact.      Pupils: Pupils are equal, round, and reactive to light.   Neck:      Musculoskeletal: Neck supple.   Cardiovascular:      Rate and Rhythm: Normal rate and regular rhythm.      Pulses: Normal pulses.      Heart sounds: Normal heart sounds.   Pulmonary:      Effort: Pulmonary effort is normal. No respiratory distress.      Breath sounds: Normal breath sounds. No wheezing.   Abdominal:      General: Bowel sounds are normal. There is no distension.      Palpations: Abdomen is soft.      Tenderness: There is no tenderness.   Musculoskeletal:         General: Swelling present.      Comments: Left hip edema, short and externally rotated   Skin:     General: Skin is warm and dry.      Capillary Refill: Capillary refill takes less than 2 seconds.   Neurological:      General: No focal deficit present.      Mental Status: He is alert and oriented to person, place, and time. Mental status is at baseline.   Psychiatric:         Mood and Affect: Mood normal.         Behavior: Behavior normal.         Thought Content: Thought content normal.         Judgment: Judgment normal.         Laboratory:  Recent Labs     11/27/19  0790    WBC 6.4   RBC 5.09   HEMOGLOBIN 15.8   HEMATOCRIT 46.6   MCV 91.6   MCH 31.0   MCHC 33.9   RDW 42.7   PLATELETCT 176   MPV 11.7     Recent Labs     11/27/19  1815   CO2 23   GLUCOSE 246*   BUN 19   CREATININE 1.09   CALCIUM 8.5     Recent Labs     11/27/19  1815   ALTSGPT 29   ASTSGOT 27   ALKPHOSPHAT 60   TBILIRUBIN 0.6   GLUCOSE 246*         No results for input(s): NTPROBNP in the last 72 hours.      No results for input(s): TROPONINT in the last 72 hours.    Urinalysis:    No results found     Imaging:  DX-HIP-COMPLETE - UNILATERAL 2+ LEFT   Final Result      1.  Mild/moderately displaced subtrochanteric proximal left femur fracture.   2.  Degenerative changes of the hips.      DX-FEMUR-1 VIEW LEFT   Final Result      Acute, obliquely oriented fracture of subtrochanteric femoral diaphysis.      DX-CHEST-PORTABLE (1 VIEW)    (Results Pending)         Assessment/Plan:  I anticipate this patient will require at least two midnights for appropriate medical management, necessitating inpatient admission.    * Femur fracture (HCC)  Assessment & Plan  Left sided subtrochanteric femur fracture   Cont with pain control   Pt/ot eval as appropriate  Ortho has been consulted for eval   NPO after midnight    Marijuana use, continuous- (present on admission)  Assessment & Plan  Daily use     BPH (benign prostatic hyperplasia)  Assessment & Plan  Unknown home medications, cont to monitor for now prn bladder scan as appropriate    DM (diabetes mellitus) (HCC)  Assessment & Plan  With associated hyperglycemia and uncontrolled  SSI ordered   accu checks      HTN (hypertension)  Assessment & Plan  Unknown home medications at this time   Prn hydralazine ordered       VTE prophylaxis: scd

## 2019-11-28 NOTE — ASSESSMENT & PLAN NOTE
Left sided subtrochanteric femur fracture   Cont with pain control   Pt/ot eval as appropriate  SNF to [place]   Family willing to provide transportation   Ok to provide pain medications if pt is in significant pain   12/5- primary care in [place] will try to arrange SNF . Continue PT/OT whenever possible while inpatient     12/10 encourage activity, pain control  Appears comfortable  [facility], patient states his friend Erik can help with transport    12/11 requiring 1 person + assist for transfers  Plan for transfer to [place], when accepted    12/12 staples removed, PT OT with min max assist  Pending transfer to skilled nursing facility in [place]  We will need to coordinate with family regarding transfer   assisting    12/14 improving activity tolerance  Family to assist, running to pick patient up if able to provide appropriate level of assistance at home  Versus transfer to skilled nursing facility in [place]    12/15 intermittent pain, postop day #17  Encourage activity  Family to be educated regarding therapy needs and assistance  Needs 24/7 supervision  On oxycodone  Add Motrin as needed  Has lidocaine patch    12/16 postop day #18  Pain controlled  Family planning to come to  patient, therapy to educate family regarding assistance needs   assisting either discharged home with family and 24/7 supervision or transfer to skilled nursing facility in [place] when bed available  12/17- pending transfer to [place]. Pain reasonable well controled. Pt/ot following

## 2019-11-28 NOTE — CARE PLAN
Problem: Communication  Goal: The ability to communicate needs accurately and effectively will improve  Outcome: PROGRESSING AS EXPECTED  Pt uses call light appropriately, can communicate his needs, return from PACU & review plan of care, will continue to monitor     Problem: Infection  Goal: Will remain free from infection  Outcome: PROGRESSING AS EXPECTED  ABX, universal precautions, monitor labs     Problem: Pain Management  Goal: Pain level will decrease to patient's comfort goal  Outcome: PROGRESSING AS EXPECTED  PO meds as needed, pt reporting decent pain relief

## 2019-11-28 NOTE — CONSULTS
DATE OF SERVICE:  11/28/2019    REQUESTING PHYSICIAN:  Sanju De La Cruz MD    CHIEF COMPLAINT:  Left thigh pain.    HISTORY OF PRESENT ILLNESS:  The patient is 79 years old.  He lives in   [place].  He was traveling through with his nephew, slipped on some ice   crystals at the gas station, fell onto his left hip, could not bear weight,   was seen in the emergency room, found to have a subtrochanteric femur   fracture, admitted to the hospitalist service.  Orthopedic consultation was   requested.    ALLERGIES:  None.    MEDICATIONS:  None.    PAST MEDICAL HISTORY:  Diabetes, hypertension.    PAST SURGICAL HISTORY:  None.    SOCIAL HISTORY:  The patient smokes.  He does not drink alcohol.  He does   smoke marijuana as well.    FAMILY HISTORY:  Negative.    REVIEW OF SYSTEMS:  No loss of consciousness, nausea, vomiting, diarrhea,   constipation, polyuria, dysuria, fevers, chills, weight loss, weight gain,   abdominal pain, chest pain or shortness of breath.    PHYSICAL EXAMINATION:  GENERAL:  The patient is in no acute distress, lying on the bed, is in   traction.  VITAL SIGNS:  His blood pressure 153/97, heart rate 76, respirations 16,   temperature 98.1.  HEENT:  Normocephalic, atraumatic.  NECK:  Supple, nontender.  CHEST AND ABDOMEN:  Nontender.  No labored breathing.  EXTREMITIES:  The right lower and bilateral upper extremities without   tenderness or deformity.  Left lower extremity is shortened and externally   rotated.  Skin over the left thigh is intact.    LABORATORY DATA:  Include white blood cell count of 9200, hematocrit 43.6%,   platelet count 162,000.  Sodium 140, potassium 4.5, creatinine 0.99.  AST and   ALT are normal.  Albumin is 3.5.    IMAGING:  Radiographs of the left femur and hip show a spiral subtrochanteric   femur fracture, which is displaced.    ASSESSMENT:  Left subtrochanteric femur fracture, closed, spiral, displaced.    PLAN:  Recommended intramedullary nailing.  Discussed this with  the patient.    Risks include bleeding, infection, neurovascular injury, pain, stiffness,   nonunion, malunion, thromboembolic phenomena, anesthetic and medical   complications, etc.       ____________________________________     MD POPPY KELLY / FEDERICO    DD:  11/28/2019 07:18:15  DT:  11/28/2019 07:41:36    D#:  [id number]  Job#:  [id number]

## 2019-11-29 LAB
GLUCOSE BLD-MCNC: 162 MG/DL (ref 65–99)
GLUCOSE BLD-MCNC: 169 MG/DL (ref 65–99)
GLUCOSE BLD-MCNC: 177 MG/DL (ref 65–99)

## 2019-11-29 PROCEDURE — 99233 SBSQ HOSP IP/OBS HIGH 50: CPT | Performed by: INTERNAL MEDICINE

## 2019-11-29 PROCEDURE — 51798 US URINE CAPACITY MEASURE: CPT

## 2019-11-29 PROCEDURE — 82962 GLUCOSE BLOOD TEST: CPT | Mod: 91

## 2019-11-29 PROCEDURE — A9270 NON-COVERED ITEM OR SERVICE: HCPCS | Performed by: HOSPITALIST

## 2019-11-29 PROCEDURE — 770006 HCHG ROOM/CARE - MED/SURG/GYN SEMI*

## 2019-11-29 PROCEDURE — A9270 NON-COVERED ITEM OR SERVICE: HCPCS | Performed by: PHYSICIAN ASSISTANT

## 2019-11-29 PROCEDURE — 700102 HCHG RX REV CODE 250 W/ 637 OVERRIDE(OP): Performed by: PHYSICIAN ASSISTANT

## 2019-11-29 PROCEDURE — 700102 HCHG RX REV CODE 250 W/ 637 OVERRIDE(OP): Performed by: HOSPITALIST

## 2019-11-29 PROCEDURE — 700111 HCHG RX REV CODE 636 W/ 250 OVERRIDE (IP): Performed by: ORTHOPAEDIC SURGERY

## 2019-11-29 PROCEDURE — A9270 NON-COVERED ITEM OR SERVICE: HCPCS | Performed by: INTERNAL MEDICINE

## 2019-11-29 PROCEDURE — 700102 HCHG RX REV CODE 250 W/ 637 OVERRIDE(OP): Performed by: INTERNAL MEDICINE

## 2019-11-29 RX ORDER — CHOLECALCIFEROL (VITAMIN D3) 50 MCG
2000 TABLET ORAL EVERY MORNING
COMMUNITY

## 2019-11-29 RX ORDER — FINASTERIDE 5 MG/1
5 TABLET, FILM COATED ORAL DAILY
COMMUNITY

## 2019-11-29 RX ORDER — FINASTERIDE 5 MG/1
5 TABLET, FILM COATED ORAL DAILY
Status: DISCONTINUED | OUTPATIENT
Start: 2019-11-29 | End: 2019-12-20 | Stop reason: HOSPADM

## 2019-11-29 RX ORDER — DOXAZOSIN MESYLATE 4 MG/1
4 TABLET ORAL EVERY EVENING
Status: ON HOLD | COMMUNITY
End: 2019-12-19

## 2019-11-29 RX ORDER — GABAPENTIN 300 MG/1
900 CAPSULE ORAL EVERY EVENING
COMMUNITY

## 2019-11-29 RX ORDER — TAMSULOSIN HYDROCHLORIDE 0.4 MG/1
0.4 CAPSULE ORAL
Status: DISCONTINUED | OUTPATIENT
Start: 2019-11-29 | End: 2019-12-20 | Stop reason: HOSPADM

## 2019-11-29 RX ORDER — TAMSULOSIN HYDROCHLORIDE 0.4 MG/1
0.4 CAPSULE ORAL
COMMUNITY

## 2019-11-29 RX ORDER — HYDROCODONE BITARTRATE AND ACETAMINOPHEN 5; 325 MG/1; MG/1
1 TABLET ORAL EVERY 4 HOURS PRN
Status: ON HOLD | COMMUNITY
End: 2019-12-19

## 2019-11-29 RX ADMIN — FINASTERIDE 5 MG: 5 TABLET, FILM COATED ORAL at 18:33

## 2019-11-29 RX ADMIN — CEFAZOLIN SODIUM 1 G: 1 INJECTION, SOLUTION INTRAVENOUS at 05:15

## 2019-11-29 RX ADMIN — SENNOSIDES AND DOCUSATE SODIUM 2 TABLET: 8.6; 5 TABLET ORAL at 05:15

## 2019-11-29 RX ADMIN — ENOXAPARIN SODIUM 40 MG: 100 INJECTION SUBCUTANEOUS at 05:15

## 2019-11-29 RX ADMIN — TAMSULOSIN HYDROCHLORIDE 0.4 MG: 0.4 CAPSULE ORAL at 15:16

## 2019-11-29 RX ADMIN — OXYCODONE HYDROCHLORIDE 5 MG: 5 TABLET ORAL at 05:16

## 2019-11-29 RX ADMIN — INSULIN HUMAN 2 UNITS: 100 INJECTION, SOLUTION PARENTERAL at 08:46

## 2019-11-29 RX ADMIN — OXYCODONE HYDROCHLORIDE 10 MG: 10 TABLET ORAL at 08:45

## 2019-11-29 RX ADMIN — INSULIN HUMAN 1 UNITS: 100 INJECTION, SOLUTION PARENTERAL at 18:30

## 2019-11-29 RX ADMIN — OXYCODONE HYDROCHLORIDE 10 MG: 10 TABLET ORAL at 15:16

## 2019-11-29 RX ADMIN — SENNOSIDES AND DOCUSATE SODIUM 2 TABLET: 8.6; 5 TABLET ORAL at 18:33

## 2019-11-29 ASSESSMENT — ENCOUNTER SYMPTOMS
ABDOMINAL PAIN: 0
SHORTNESS OF BREATH: 0
EYE PAIN: 0
NECK PAIN: 0
DEPRESSION: 0
CHILLS: 0
VOMITING: 0
ORTHOPNEA: 0
DIARRHEA: 0
SEIZURES: 0
SPUTUM PRODUCTION: 0
COUGH: 0
FOCAL WEAKNESS: 0
HEADACHES: 0
INSOMNIA: 0
WEIGHT LOSS: 0
EYE DISCHARGE: 0
FALLS: 1
EYE REDNESS: 0
NAUSEA: 0
STRIDOR: 0
BACK PAIN: 0
NERVOUS/ANXIOUS: 0

## 2019-11-29 NOTE — PROGRESS NOTES
Patient extremely agitated. Complains about constantly being monitored (regarding vitals signs, blood sugar checks, bladder scans.) Patient verbally aggressive towards staff. Adamantly refusing to move in any capacity, will not allow RN and CNA to reposition in bed. Occasionally confused requiring some redirection.

## 2019-11-29 NOTE — DISCHARGE PLANNING
Care Transition Team Assessment  Anticipated Discharge Disposition: Unknown at this time    Action: Met with patient to discuss discharge plans, states he was on vacation from [place] when this happened his nephew, Lance was with him but drove back to [place]. States he lives alone has a cane and does not wear O2 at home. His PCP is Dr. Calvin Art.     Barriers to Discharge: medical clearance and discharge plan    Plan: dc plan unknown at this time    Information Source  Orientation : Oriented x 4  Information Given By: Patient  Informant's Name: Nazario  Who is responsible for making decisions for patient? : Patient    Readmission Evaluation  Is this a readmission?: No    Elopement Risk  Legal Hold: No  Ambulatory or Self Mobile in Wheelchair: No-Not an Elopement Risk  Elopement Risk: Not at Risk for Elopement    Interdisciplinary Discharge Planning  Patient or legal guardian wants to designate a caregiver (see row info): No    Discharge Preparedness  What is your plan after discharge?: Uncertain - pending medical team collaboration  What are your discharge supports?: Child, Sibling  Prior Functional Level: Independent with Activities of Daily Living    Functional Assesment  Prior Functional Level: Independent with Activities of Daily Living    Finances  Financial Barriers to Discharge: No  Prescription Coverage: Yes    Vision / Hearing Impairment  Hearing Impairment: Both Ears              Domestic Abuse  Have you ever been the victim of abuse or violence?: No  Physical Abuse or Sexual Abuse: No  Verbal Abuse or Emotional Abuse: No  Possible Abuse Reported to:: Not Applicable    Psychological Assessment  History of Substance Abuse: Marijuana  History of Psychiatric Problems: No  Non-compliant with Treatment: Yes    Discharge Risks or Barriers  Discharge risks or barriers?: Transportation, Post-acute placement / services, Complex medical needs    Anticipated Discharge Information  Anticipated discharge  disposition: Discharge needs currently unknown  Discharge Address: [address] [place] [address] [address]  Discharge Contact Phone Number: [phone]

## 2019-11-29 NOTE — CARE PLAN
Problem: Knowledge Deficit  Goal: Knowledge of disease process/condition, treatment plan, diagnostic tests, and medications will improve  Outcome: PROGRESSING AS EXPECTED  Goal: Knowledge of the prescribed therapeutic regimen will improve  Outcome: PROGRESSING AS EXPECTED     Problem: Psychosocial Needs:  Goal: Level of anxiety will decrease  Outcome: PROGRESSING AS EXPECTED     Patient agitated, complains about vital signs/blood sugar checks. Patient refuses to move. Patient occasionally confused, reorientation and education provided.

## 2019-11-29 NOTE — PROGRESS NOTES
Per outside records from [facility] and [facility], [facility], and [facility] patient's medication list as follows:    Doxazosin 8 mg at bedtime  Niacin 500 mg daily   Aspirin 81 mg daily  Atorvastatin 20 mg daily  Vitamin D 2000 units daily  Finasteride 5 mg daily  Gabapentin 600 mg three times daily   Norco 5/325 Q4H PRN  Metformin 500 mg daily  Flomax 0.4 mg daily     Unable to verify which medications are still current or last doses taken, as pt is not able to recall what he takes or what pharmacy he uses   Not added to med for for this reason

## 2019-11-29 NOTE — PROGRESS NOTES
"   Orthopaedic PA Progress Note    Interval changes:C/IO urinary retention, requesting either to resume his home meds (list in evita) or cath. Pharm consult requested    ROS - Patient denies any new issues. No chest pain, dyspnea, or fever.  Pain well controlled.    /95   Pulse (!) 101   Temp 36.4 °C (97.6 °F) (Temporal)   Resp 15   Ht 1.702 m (5' 7\")   Wt 74.8 kg (164 lb 14.5 oz)   SpO2 95%     Patient seen and examined  No acute distress  Breathing non labored  RRR  Surgical dressing is clean, dry, and intact. Patient clearly fires tibialis anterior, EHL, and gastrocnemius/soleus. Sensation is intact to light touch throughout superficial peroneal, deep peroneal, tibial, saphenous, and sural nerve distributions. Strong and palpable 2+ dorsalis pedis and posterior tibial pulses with capillary refill less than 2 seconds. No lower leg tenderness or discomfort.    Recent Labs     11/27/19  1815 11/28/19  0450   WBC 6.4 9.2   RBC 5.09 4.72   HEMOGLOBIN 15.8 14.3   HEMATOCRIT 46.6 43.6   MCV 91.6 92.4   MCH 31.0 30.3   MCHC 33.9 32.8*   RDW 42.7 42.6   PLATELETCT 176 162*   MPV 11.7 11.4     Active Hospital Problems    Diagnosis   • Femur fracture (HCC) [S72.90XA]   • Essential hypertension [I10]   • Diabetes mellitus with coincident hypertension (HCC) [E11.9, I10]   • BPH (benign prostatic hyperplasia) [N40.0]   • Marijuana use, continuous [F12.90]     Assessment/Plan:  POD#1 /P L hip IMN  Wt bearing status - AT  PT/OT-initiated  Wound care:dressing left in place  Drains - no  Garibay-no  Sutures/Staples out- 10-14 days post operatively  Antibiotics: complete  DVT Prophylaxis- TEDS/SCDs/Foot pumps.   Lovenox: Start 40 mg daily, Duration-until ambulatory > 150', may transition to ASA BID on discharge  Future Procedures - none planned  Case Coordination for Discharge Planning - Disposition Per Med/PT/OT  Follow-Up: needs appointment with local Orthopedic traumatologist at home ([place], [place]) at  10-14 days " post-op for re-evaluation, staple removal and radiographs.

## 2019-11-29 NOTE — THERAPY
OT referral received. Pt angrily adamantly refused eval despite extensive education on risks of self-imposed bedrest. Will attempt again.

## 2019-11-29 NOTE — CARE PLAN
Problem: Communication  Goal: The ability to communicate needs accurately and effectively will improve  Outcome: PROGRESSING SLOWER THAN EXPECTED  Pt uses call light appropriately, can  communicate his needs, but is aggressive& hostile, review plan of care but is dismissive of plan & degrading to nursing care team     Problem: Fluid Volume:  Goal: Will maintain balanced intake and output  Outcome: PROGRESSING SLOWER THAN EXPECTED  Pt reporting decreased urine output, intake minimal also, getting bladder scan, will continue to monitor     Problem: Psychosocial Needs:  Goal: Level of anxiety will decrease  Outcome: PROGRESSING SLOWER THAN EXPECTED  Pt has become rude, aggressive & disrespectful, attempts to understand concerns are not productive as he's insulting, dismissive, attempts to explain plan of care are ignored, reported to care team

## 2019-11-29 NOTE — PROGRESS NOTES
Pt being verbally aggressive & disrespectful to staff, demanding to see an  & wanting to take his home medication of evin wilhelm. Stating his  heritage gives him rights to take these alternative medications, will contact AP[facility]    Attending updated, added Flomax. Additionally pt refusing Pt/OT care today, refusing ambulation.

## 2019-11-29 NOTE — PROGRESS NOTES
Hospital Medicine Daily Progress Note    Date of Service  11/29/2019    Chief Complaint  79 y.o. male admitted 11/27/2019 with GLF, hip pain    Hospital Course    79 y.o. male who presented 11/27/2019 with past medical history of diabetes, hypertension who presents with left hip pain and found to have femromal fracture.      Interval Problem Update  POD 1. Having left hip pain at surgical site. PT/OT. Pain control. He said he won't be able to do PT/OT because of his pain today.  Patient was seen and examined by me today. Case was discussed with RN and multidisplinary team during rounds. Denies nausea, vomiting, diarrhea.       Consultants/Specialty  ortho    Code Status  fuoll    Disposition  Remain on the floor    Review of Systems  Review of Systems   Constitutional: Negative for chills and weight loss.   HENT: Negative for congestion and nosebleeds.    Eyes: Negative for pain, discharge and redness.   Respiratory: Negative for cough, sputum production, shortness of breath and stridor.    Cardiovascular: Negative for chest pain and orthopnea.   Gastrointestinal: Negative for abdominal pain, diarrhea, nausea and vomiting.   Genitourinary: Negative for frequency and urgency.   Musculoskeletal: Positive for falls and joint pain. Negative for back pain and neck pain.        Left hip pain   Skin: Negative for itching and rash.   Neurological: Negative for focal weakness, seizures and headaches.   Psychiatric/Behavioral: Negative for depression. The patient is not nervous/anxious and does not have insomnia.         Physical Exam  Temp:  [36.1 °C (97 °F)-37 °C (98.6 °F)] 36.4 °C (97.6 °F)  Pulse:  [] 94  Resp:  [11-23] 18  BP: (118-161)/() 123/81  SpO2:  [92 %-100 %] 92 %    Physical Exam  Vitals signs reviewed.   Constitutional:       Appearance: Normal appearance.   HENT:      Head: Normocephalic.      Nose: No congestion.   Eyes:      Extraocular Movements: Extraocular movements intact.      Pupils: Pupils  are equal, round, and reactive to light.   Neck:      Musculoskeletal: Normal range of motion and neck supple.   Cardiovascular:      Rate and Rhythm: Normal rate and regular rhythm.      Pulses: Normal pulses.   Pulmonary:      Effort: Pulmonary effort is normal.      Breath sounds: Normal breath sounds.   Abdominal:      General: Bowel sounds are normal. There is no distension.      Palpations: Abdomen is soft.   Musculoskeletal:         General: Tenderness present. No swelling.      Comments: Left hip pain   Skin:     General: Skin is warm.      Findings: No erythema.   Neurological:      General: No focal deficit present.      Mental Status: He is alert and oriented to person, place, and time.         Fluids    Intake/Output Summary (Last 24 hours) at 11/29/2019 0742  Last data filed at 11/29/2019 0441  Gross per 24 hour   Intake 1380 ml   Output 770 ml   Net 610 ml       Laboratory  Recent Labs     11/27/19 1815 11/28/19  0450   WBC 6.4 9.2   RBC 5.09 4.72   HEMOGLOBIN 15.8 14.3   HEMATOCRIT 46.6 43.6   MCV 91.6 92.4   MCH 31.0 30.3   MCHC 33.9 32.8*   RDW 42.7 42.6   PLATELETCT 176 162*   MPV 11.7 11.4     Recent Labs     11/27/19  1815 11/28/19  0450   SODIUM 140 140   POTASSIUM 3.9 4.5   CHLORIDE 106 107   CO2 23 27   GLUCOSE 246* 131*   BUN 19 20   CREATININE 1.09 0.99   CALCIUM 8.5 8.6                   Imaging  DX-FEMUR-2+ LEFT   Final Result      Intraoperative evaluation of femur fracture fixation.      DX-PORTABLE FLUORO > 1 HOUR   Final Result      Intraoperative evaluation of femur fracture fixation.      DX-CHEST-PORTABLE (1 VIEW)   Final Result      No acute cardiopulmonary abnormality. No displaced rib fractures. No pneumothorax.      DX-HIP-COMPLETE - UNILATERAL 2+ LEFT   Final Result      1.  Mild/moderately displaced subtrochanteric proximal left femur fracture.   2.  Degenerative changes of the hips.      DX-FEMUR-1 VIEW LEFT   Final Result      Acute, obliquely oriented fracture of  subtrochanteric femoral diaphysis.           Assessment/Plan  * Femur fracture (HCC)  Assessment & Plan  Left sided subtrochanteric femur fracture   POD 1 IMN  Cont with pain control   Pt/ot eval as appropriate  Likely will need SNF        Marijuana use, continuous- (present on admission)  Assessment & Plan  Daily use     BPH (benign prostatic hyperplasia)  Assessment & Plan  Unknown home medications, cont to monitor for now prn bladder scan as appropriate    Diabetes mellitus with coincident hypertension (HCC)  Assessment & Plan  With associated hyperglycemia and uncontrolled  SSI ordered   accu checks      Essential hypertension  Assessment & Plan  Unknown home medications at this time \  BP stable  Prn hydralazine ordered        VTE prophylaxis: lovenox

## 2019-11-30 LAB
GLUCOSE BLD-MCNC: 150 MG/DL (ref 65–99)
GLUCOSE BLD-MCNC: 155 MG/DL (ref 65–99)
GLUCOSE BLD-MCNC: 156 MG/DL (ref 65–99)
GLUCOSE BLD-MCNC: 164 MG/DL (ref 65–99)

## 2019-11-30 PROCEDURE — 99232 SBSQ HOSP IP/OBS MODERATE 35: CPT | Performed by: INTERNAL MEDICINE

## 2019-11-30 PROCEDURE — 700111 HCHG RX REV CODE 636 W/ 250 OVERRIDE (IP): Performed by: ORTHOPAEDIC SURGERY

## 2019-11-30 PROCEDURE — 700102 HCHG RX REV CODE 250 W/ 637 OVERRIDE(OP): Performed by: PHYSICIAN ASSISTANT

## 2019-11-30 PROCEDURE — A9270 NON-COVERED ITEM OR SERVICE: HCPCS | Performed by: HOSPITALIST

## 2019-11-30 PROCEDURE — 97162 PT EVAL MOD COMPLEX 30 MIN: CPT

## 2019-11-30 PROCEDURE — 770006 HCHG ROOM/CARE - MED/SURG/GYN SEMI*

## 2019-11-30 PROCEDURE — 97166 OT EVAL MOD COMPLEX 45 MIN: CPT

## 2019-11-30 PROCEDURE — A9270 NON-COVERED ITEM OR SERVICE: HCPCS | Performed by: INTERNAL MEDICINE

## 2019-11-30 PROCEDURE — 82962 GLUCOSE BLOOD TEST: CPT | Mod: 91

## 2019-11-30 PROCEDURE — 700102 HCHG RX REV CODE 250 W/ 637 OVERRIDE(OP): Performed by: HOSPITALIST

## 2019-11-30 PROCEDURE — 700102 HCHG RX REV CODE 250 W/ 637 OVERRIDE(OP): Performed by: INTERNAL MEDICINE

## 2019-11-30 PROCEDURE — A9270 NON-COVERED ITEM OR SERVICE: HCPCS | Performed by: PHYSICIAN ASSISTANT

## 2019-11-30 RX ADMIN — FINASTERIDE 5 MG: 5 TABLET, FILM COATED ORAL at 05:24

## 2019-11-30 RX ADMIN — INSULIN HUMAN 1 UNITS: 100 INJECTION, SOLUTION PARENTERAL at 20:33

## 2019-11-30 RX ADMIN — SENNOSIDES AND DOCUSATE SODIUM 2 TABLET: 8.6; 5 TABLET ORAL at 17:49

## 2019-11-30 RX ADMIN — ENOXAPARIN SODIUM 40 MG: 100 INJECTION SUBCUTANEOUS at 05:25

## 2019-11-30 RX ADMIN — OXYCODONE HYDROCHLORIDE 10 MG: 10 TABLET ORAL at 13:49

## 2019-11-30 RX ADMIN — TAMSULOSIN HYDROCHLORIDE 0.4 MG: 0.4 CAPSULE ORAL at 08:46

## 2019-11-30 RX ADMIN — INSULIN HUMAN 1 UNITS: 100 INJECTION, SOLUTION PARENTERAL at 11:51

## 2019-11-30 RX ADMIN — OXYCODONE HYDROCHLORIDE 10 MG: 10 TABLET ORAL at 05:24

## 2019-11-30 RX ADMIN — SENNOSIDES AND DOCUSATE SODIUM 2 TABLET: 8.6; 5 TABLET ORAL at 05:24

## 2019-11-30 RX ADMIN — INSULIN HUMAN 1 UNITS: 100 INJECTION, SOLUTION PARENTERAL at 17:50

## 2019-11-30 RX ADMIN — OXYCODONE HYDROCHLORIDE 10 MG: 10 TABLET ORAL at 21:40

## 2019-11-30 RX ADMIN — OXYCODONE HYDROCHLORIDE 10 MG: 10 TABLET ORAL at 08:45

## 2019-11-30 ASSESSMENT — ENCOUNTER SYMPTOMS
DIARRHEA: 0
INSOMNIA: 0
STRIDOR: 0
CHILLS: 0
HEARTBURN: 0
ORTHOPNEA: 0
COUGH: 0
DEPRESSION: 0
FALLS: 1
SEIZURES: 0
WEIGHT LOSS: 0
FOCAL WEAKNESS: 0
NERVOUS/ANXIOUS: 0
NECK PAIN: 0
PHOTOPHOBIA: 0
HEADACHES: 0
VOMITING: 0
EYE PAIN: 0
SPUTUM PRODUCTION: 0
ABDOMINAL PAIN: 0
EYE DISCHARGE: 0

## 2019-11-30 ASSESSMENT — COGNITIVE AND FUNCTIONAL STATUS - GENERAL
SUGGESTED CMS G CODE MODIFIER DAILY ACTIVITY: CK
CLIMB 3 TO 5 STEPS WITH RAILING: TOTAL
WALKING IN HOSPITAL ROOM: TOTAL
MOVING TO AND FROM BED TO CHAIR: UNABLE
SUGGESTED CMS G CODE MODIFIER MOBILITY: CN
MOVING FROM LYING ON BACK TO SITTING ON SIDE OF FLAT BED: UNABLE
STANDING UP FROM CHAIR USING ARMS: TOTAL
TOILETING: A LOT
MOBILITY SCORE: 6
TURNING FROM BACK TO SIDE WHILE IN FLAT BAD: UNABLE
DRESSING REGULAR UPPER BODY CLOTHING: A LITTLE
HELP NEEDED FOR BATHING: A LOT
DAILY ACTIVITIY SCORE: 16
PERSONAL GROOMING: A LITTLE
DRESSING REGULAR LOWER BODY CLOTHING: A LOT

## 2019-11-30 ASSESSMENT — ACTIVITIES OF DAILY LIVING (ADL): TOILETING: INDEPENDENT

## 2019-11-30 ASSESSMENT — GAIT ASSESSMENTS: GAIT LEVEL OF ASSIST: REFUSED

## 2019-11-30 NOTE — THERAPY
"Physical Therapy Evaluation completed.   Bed Mobility:  Supine to Sit: Maximal Assist  Transfers: Sit to Stand: Refused  Gait: Level Of Assist: Refused with Front-Wheel Walker       Plan of Care: Will benefit from Physical Therapy 4 times per week  Discharge Recommendations: Equipment: Will Continue to Assess for Equipment Needs. Post-acute therapy Recommend post-acute placement for continued physical therapy services prior to discharge home. Patient can tolerate post-acute therapies at a 5x/week frequency.       See \"Rehab Therapy-Acute\" Patient Summary Report for complete documentation.     "

## 2019-11-30 NOTE — CARE PLAN
"  Problem: Discharge Barriers/Planning  Goal: Patient's continuum of care needs will be met  Outcome: PROGRESSING AS EXPECTED  Attempted to update pt on POC, pt verbalized he just wants to sleep. Pt still needs medical clearance, no set/defined discharge plan at this time, PT/OT needs to eval patient still as he refused earlier on 11/29. Pt is asleep at this time.       Problem: Pain Management  Goal: Pain level will decrease to patient's comfort goal  Outcome: PROGRESSING AS EXPECTED   Reported pain as 3/10 when not moving, \"like a 10\" when moving LLE. Pt verbalized he will call if needing pain medication, denied further needs.  "

## 2019-11-30 NOTE — PROGRESS NOTES
Hospital Medicine Daily Progress Note    Date of Service  11/30/2019    Chief Complaint  79 y.o. male admitted 11/27/2019 with GLF, hip pain    Hospital Course    79 y.o. male who presented 11/27/2019 with past medical history of diabetes, hypertension who presents with left hip pain and found to have femromal fracture.      Interval Problem Update  POD 2.  The patient refused PT and OT yesterday because of extreme pain.  Today his pain is better and he is ready to work with PT and OT.  He asked about how long he see going to be in skilled and I explained to him that it is dependent on him and usually take about 1 to 3 weeks to be at rehabilitation facility.    Patient was seen and examined by me today. Case was discussed with RN and multidisplinary team during rounds. Denies nausea, vomiting, diarrhea.       Consultants/Specialty  ortho    Code Status  fuoll    Disposition  Remain on the floor    Review of Systems  Review of Systems   Constitutional: Negative for chills and weight loss.   HENT: Negative for congestion and nosebleeds.    Eyes: Negative for photophobia, pain and discharge.   Respiratory: Negative for cough, sputum production and stridor.    Cardiovascular: Negative for chest pain and orthopnea.   Gastrointestinal: Negative for abdominal pain, diarrhea, heartburn and vomiting.   Genitourinary: Negative for frequency.   Musculoskeletal: Positive for falls and joint pain. Negative for neck pain.        Left hip pain   Skin: Negative for itching and rash.   Neurological: Negative for focal weakness, seizures and headaches.   Psychiatric/Behavioral: Negative for depression. The patient is not nervous/anxious and does not have insomnia.         Physical Exam  Temp:  [36.4 °C (97.6 °F)-37.2 °C (98.9 °F)] 37.2 °C (98.9 °F)  Pulse:  [9-115] 9  Resp:  [15-18] 18  BP: (106-141)/(71-95) 120/72  SpO2:  [90 %-95 %] 90 %    Physical Exam  Vitals signs reviewed.   Constitutional:       Appearance: Normal appearance.    HENT:      Head: Normocephalic.      Nose: No congestion.   Eyes:      Pupils: Pupils are equal, round, and reactive to light.   Neck:      Musculoskeletal: Normal range of motion and neck supple.   Cardiovascular:      Rate and Rhythm: Normal rate.      Pulses: Normal pulses.   Pulmonary:      Effort: Pulmonary effort is normal.   Abdominal:      General: Bowel sounds are normal. There is no distension.   Musculoskeletal:         General: Tenderness present. No swelling.      Comments: Left hip pain   Skin:     Findings: No erythema.   Neurological:      General: No focal deficit present.      Mental Status: He is alert and oriented to person, place, and time.         Fluids    Intake/Output Summary (Last 24 hours) at 11/30/2019 0737  Last data filed at 11/30/2019 0400  Gross per 24 hour   Intake 236 ml   Output 880 ml   Net -644 ml       Laboratory  Recent Labs     11/27/19 1815 11/28/19  0450   WBC 6.4 9.2   RBC 5.09 4.72   HEMOGLOBIN 15.8 14.3   HEMATOCRIT 46.6 43.6   MCV 91.6 92.4   MCH 31.0 30.3   MCHC 33.9 32.8*   RDW 42.7 42.6   PLATELETCT 176 162*   MPV 11.7 11.4     Recent Labs     11/27/19  1815 11/28/19  0450   SODIUM 140 140   POTASSIUM 3.9 4.5   CHLORIDE 106 107   CO2 23 27   GLUCOSE 246* 131*   BUN 19 20   CREATININE 1.09 0.99   CALCIUM 8.5 8.6                   Imaging  DX-FEMUR-2+ LEFT   Final Result      Intraoperative evaluation of femur fracture fixation.      DX-PORTABLE FLUORO > 1 HOUR   Final Result      Intraoperative evaluation of femur fracture fixation.      DX-CHEST-PORTABLE (1 VIEW)   Final Result      No acute cardiopulmonary abnormality. No displaced rib fractures. No pneumothorax.      DX-HIP-COMPLETE - UNILATERAL 2+ LEFT   Final Result      1.  Mild/moderately displaced subtrochanteric proximal left femur fracture.   2.  Degenerative changes of the hips.      DX-FEMUR-1 VIEW LEFT   Final Result      Acute, obliquely oriented fracture of subtrochanteric femoral diaphysis.            Assessment/Plan  * Femur fracture (HCC)  Assessment & Plan  Left sided subtrochanteric femur fracture   POD 2 IMN  Cont with pain control   Pt/ot eval as appropriate  Likely will need SNF        Marijuana use, continuous- (present on admission)  Assessment & Plan  Daily use     BPH (benign prostatic hyperplasia)  Assessment & Plan  Unknown home medications, cont to monitor for now prn bladder scan as appropriate    Diabetes mellitus with coincident hypertension (HCC)  Assessment & Plan  With associated hyperglycemia and uncontrolled  SSI ordered   accu checks      Essential hypertension  Assessment & Plan  Unknown home medications at this time \  BP stable  Prn hydralazine ordered        VTE prophylaxis: lovenox

## 2019-11-30 NOTE — PROGRESS NOTES
"Pt refused blood glucose check, and insulin at this time, education provided, still refused. Pt verbalized wanting to sleep, \"maybe tomorrow\".  "

## 2019-11-30 NOTE — THERAPY
"Occupational Therapy Evaluation completed.   Functional Status:  Pt presents to skilled OT services following GLF sustaining in Lt hip fracture now post L hip IMN, WBAT. Pt performed bed mobility with max assist d/t pain and pt's resistance, F balance sitting eob, declined any further mobility and ADLs once upright. Pt was educated throughout the session regarding need for mobilization, pain expectations; further reinforcement needed. Pt will benefit from acute skilled OT services while in house and post acute recommended prior to d/c home.   Plan of Care: Will benefit from Occupational Therapy 3 times per week  Discharge Recommendations:  Equipment: Will Continue to Assess for Equipment Needs. Post-acute therapy Recommend post-acute placement for additional occupational therapy services prior to discharge home. Patient can tolerate post-acute therapies at a 5x/week frequency.      See \"Rehab Therapy-Acute\" Patient Summary Report for complete documentation.    "

## 2019-11-30 NOTE — PROGRESS NOTES
Med rec complete per pt and outside records ([facility])   Pt reports he uses the pharmacy at the [facility] in [place], which is closed until Monday  Allergies reviewed - NKDA

## 2019-11-30 NOTE — PROGRESS NOTES
"   Orthopaedic PA Progress Note    Interval changes:Better today, requesting transfer to skilled in [place], [place] with follow-up care locally there    ROS - Patient denies any new issues. No chest pain, dyspnea, or fever.  Pain well controlled.    /69   Pulse (!) 109   Temp 36.5 °C (97.7 °F) (Temporal)   Resp 15   Ht 1.702 m (5' 7\")   Wt 74.8 kg (164 lb 14.5 oz)   SpO2 90%     Patient seen and examined  No acute distress  Breathing non labored  RRR  Surgical dressing is clean, dry, and intact. Patient clearly fires tibialis anterior, EHL, and gastrocnemius/soleus. Sensation is intact to light touch throughout superficial peroneal, deep peroneal, tibial, saphenous, and sural nerve distributions. Strong and palpable 2+ dorsalis pedis and posterior tibial pulses with capillary refill less than 2 seconds. No lower leg tenderness or discomfort.    Recent Labs     11/27/19  1815 11/28/19  0450   WBC 6.4 9.2   RBC 5.09 4.72   HEMOGLOBIN 15.8 14.3   HEMATOCRIT 46.6 43.6   MCV 91.6 92.4   MCH 31.0 30.3   MCHC 33.9 32.8*   RDW 42.7 42.6   PLATELETCT 176 162*   MPV 11.7 11.4       Active Hospital Problems    Diagnosis   • Femur fracture (HCC) [S72.90XA]   • Essential hypertension [I10]   • Diabetes mellitus with coincident hypertension (HCC) [E11.9, I10]   • BPH (benign prostatic hyperplasia) [N40.0]   • Marijuana use, continuous [F12.90]     Assessment/Plan: subtrochanteric left hip fracture POD#1 S/P L hip IMN  Wt bearing status - AT  PT/OT-initiated  Wound care:dressing left in place  Drains - no  Garibay-no  Sutures/Staples out- 10-14 days post operatively  Antibiotics: complete  DVT Prophylaxis- TEDS/SCDs/Foot pumps.   Lovenox: Start 40 mg daily, Duration-until ambulatory > 150', may transition to ASA BID on discharge  Future Procedures - none planned  Case Coordination for Discharge Planning - Disposition Per Med/PT/OT  Follow-Up: needs appointment with local Orthopedic traumatologist at home ([place], [place]) at  " 10-14 days post-op for re-evaluation, staple removal and radiographs.

## 2019-12-01 PROBLEM — D69.6 THROMBOCYTOPENIA (HCC): Status: ACTIVE | Noted: 2019-12-01

## 2019-12-01 PROBLEM — D62 ACUTE BLOOD LOSS ANEMIA: Status: ACTIVE | Noted: 2019-12-01

## 2019-12-01 LAB
BASOPHILS # BLD AUTO: 0.1 % (ref 0–1.8)
BASOPHILS # BLD: 0.01 K/UL (ref 0–0.12)
EOSINOPHIL # BLD AUTO: 0.04 K/UL (ref 0–0.51)
EOSINOPHIL NFR BLD: 0.5 % (ref 0–6.9)
ERYTHROCYTE [DISTWIDTH] IN BLOOD BY AUTOMATED COUNT: 41.7 FL (ref 35.9–50)
GLUCOSE BLD-MCNC: 125 MG/DL (ref 65–99)
GLUCOSE BLD-MCNC: 141 MG/DL (ref 65–99)
GLUCOSE BLD-MCNC: 158 MG/DL (ref 65–99)
GLUCOSE BLD-MCNC: 162 MG/DL (ref 65–99)
HCT VFR BLD AUTO: 31 % (ref 42–52)
HGB BLD-MCNC: 10.4 G/DL (ref 14–18)
IMM GRANULOCYTES # BLD AUTO: 0.03 K/UL (ref 0–0.11)
IMM GRANULOCYTES NFR BLD AUTO: 0.4 % (ref 0–0.9)
LYMPHOCYTES # BLD AUTO: 1.24 K/UL (ref 1–4.8)
LYMPHOCYTES NFR BLD: 15.5 % (ref 22–41)
MCH RBC QN AUTO: 30.9 PG (ref 27–33)
MCHC RBC AUTO-ENTMCNC: 33.5 G/DL (ref 33.7–35.3)
MCV RBC AUTO: 92 FL (ref 81.4–97.8)
MONOCYTES # BLD AUTO: 0.89 K/UL (ref 0–0.85)
MONOCYTES NFR BLD AUTO: 11.2 % (ref 0–13.4)
NEUTROPHILS # BLD AUTO: 5.77 K/UL (ref 1.82–7.42)
NEUTROPHILS NFR BLD: 72.3 % (ref 44–72)
NRBC # BLD AUTO: 0 K/UL
NRBC BLD-RTO: 0 /100 WBC
PLATELET # BLD AUTO: 155 K/UL (ref 164–446)
PMV BLD AUTO: 12.3 FL (ref 9–12.9)
RBC # BLD AUTO: 3.37 M/UL (ref 4.7–6.1)
WBC # BLD AUTO: 8 K/UL (ref 4.8–10.8)

## 2019-12-01 PROCEDURE — 85025 COMPLETE CBC W/AUTO DIFF WBC: CPT

## 2019-12-01 PROCEDURE — 700102 HCHG RX REV CODE 250 W/ 637 OVERRIDE(OP): Performed by: HOSPITALIST

## 2019-12-01 PROCEDURE — A9270 NON-COVERED ITEM OR SERVICE: HCPCS | Performed by: INTERNAL MEDICINE

## 2019-12-01 PROCEDURE — 82962 GLUCOSE BLOOD TEST: CPT

## 2019-12-01 PROCEDURE — 36415 COLL VENOUS BLD VENIPUNCTURE: CPT

## 2019-12-01 PROCEDURE — 770006 HCHG ROOM/CARE - MED/SURG/GYN SEMI*

## 2019-12-01 PROCEDURE — 700111 HCHG RX REV CODE 636 W/ 250 OVERRIDE (IP): Performed by: ORTHOPAEDIC SURGERY

## 2019-12-01 PROCEDURE — 700101 HCHG RX REV CODE 250: Performed by: INTERNAL MEDICINE

## 2019-12-01 PROCEDURE — 700102 HCHG RX REV CODE 250 W/ 637 OVERRIDE(OP): Performed by: INTERNAL MEDICINE

## 2019-12-01 PROCEDURE — A9270 NON-COVERED ITEM OR SERVICE: HCPCS | Performed by: HOSPITALIST

## 2019-12-01 PROCEDURE — 99233 SBSQ HOSP IP/OBS HIGH 50: CPT | Performed by: INTERNAL MEDICINE

## 2019-12-01 RX ORDER — LIDOCAINE 50 MG/G
1 PATCH TOPICAL EVERY 24 HOURS
Status: DISCONTINUED | OUTPATIENT
Start: 2019-12-01 | End: 2019-12-20 | Stop reason: HOSPADM

## 2019-12-01 RX ORDER — ACETAMINOPHEN 325 MG/1
650 TABLET ORAL EVERY 6 HOURS PRN
Status: DISCONTINUED | OUTPATIENT
Start: 2019-12-01 | End: 2019-12-01

## 2019-12-01 RX ORDER — ACETAMINOPHEN 325 MG/1
650 TABLET ORAL EVERY 4 HOURS PRN
Status: DISCONTINUED | OUTPATIENT
Start: 2019-12-01 | End: 2019-12-01

## 2019-12-01 RX ORDER — IBUPROFEN 400 MG/1
400 TABLET ORAL EVERY 6 HOURS PRN
Status: DISCONTINUED | OUTPATIENT
Start: 2019-12-01 | End: 2019-12-01

## 2019-12-01 RX ORDER — CYCLOBENZAPRINE HCL 10 MG
10 TABLET ORAL 3 TIMES DAILY PRN
Status: DISCONTINUED | OUTPATIENT
Start: 2019-12-01 | End: 2019-12-18

## 2019-12-01 RX ADMIN — OXYCODONE HYDROCHLORIDE 10 MG: 10 TABLET ORAL at 12:12

## 2019-12-01 RX ADMIN — LIDOCAINE 1 PATCH: 50 PATCH TOPICAL at 17:53

## 2019-12-01 RX ADMIN — SENNOSIDES AND DOCUSATE SODIUM 2 TABLET: 8.6; 5 TABLET ORAL at 18:06

## 2019-12-01 RX ADMIN — CYCLOBENZAPRINE 10 MG: 10 TABLET, FILM COATED ORAL at 16:10

## 2019-12-01 RX ADMIN — TAMSULOSIN HYDROCHLORIDE 0.4 MG: 0.4 CAPSULE ORAL at 08:33

## 2019-12-01 RX ADMIN — INSULIN HUMAN 1 UNITS: 100 INJECTION, SOLUTION PARENTERAL at 08:36

## 2019-12-01 RX ADMIN — ENOXAPARIN SODIUM 40 MG: 100 INJECTION SUBCUTANEOUS at 08:36

## 2019-12-01 RX ADMIN — OXYCODONE HYDROCHLORIDE 10 MG: 10 TABLET ORAL at 08:35

## 2019-12-01 RX ADMIN — CYCLOBENZAPRINE 10 MG: 10 TABLET, FILM COATED ORAL at 22:48

## 2019-12-01 ASSESSMENT — ENCOUNTER SYMPTOMS
FOCAL WEAKNESS: 0
CHILLS: 0
DIARRHEA: 0
COUGH: 0
WEIGHT LOSS: 0
ABDOMINAL PAIN: 0
EYE DISCHARGE: 0
PHOTOPHOBIA: 0
ORTHOPNEA: 0
INSOMNIA: 0
SEIZURES: 0
VOMITING: 0
STRIDOR: 0
SPUTUM PRODUCTION: 0
NERVOUS/ANXIOUS: 0
FALLS: 1

## 2019-12-01 NOTE — PROGRESS NOTES
Hospital Medicine Daily Progress Note    Date of Service  12/1/2019    Chief Complaint  79 y.o. male admitted 11/27/2019 with GLF, hip pain    Hospital Course    79 y.o. male who presented 11/27/2019 with past medical history of diabetes, hypertension who presents with left hip pain and found to have femromal fracture.      Interval Problem Update  POD 3.  Evaluated by PT OT yesterday and recommended skilled which was ordered.  His pain is somewhat controlled with pain medication.  He is encouraged to sit up on the bed or on the chair.  I reviewed labs and WBC 8.0, hemoglobin 10.4, platelet 155.  Patient was seen and examined by me today. Case was discussed with RN and multidisplinary team during rounds. Denies nausea, vomiting, diarrhea.       Consultants/Specialty  ortho    Code Status  fuoll    Disposition  Remain on the floor    Review of Systems  Review of Systems   Constitutional: Negative for chills and weight loss.   HENT: Negative for congestion and nosebleeds.    Eyes: Negative for photophobia and discharge.   Respiratory: Negative for cough, sputum production and stridor.    Cardiovascular: Negative for chest pain and orthopnea.   Gastrointestinal: Negative for abdominal pain, diarrhea and vomiting.   Genitourinary: Negative for frequency.   Musculoskeletal: Positive for falls and joint pain.        Left hip pain   Skin: Negative for itching and rash.   Neurological: Negative for focal weakness and seizures.   Psychiatric/Behavioral: The patient is not nervous/anxious and does not have insomnia.         Physical Exam  Temp:  [36.3 °C (97.3 °F)-37.6 °C (99.6 °F)] 36.6 °C (97.9 °F)  Pulse:  [] 92  Resp:  [15-20] 17  BP: (112-147)/(69-87) 112/80  SpO2:  [90 %-93 %] 90 %    Physical Exam  Vitals signs reviewed.   Constitutional:       Appearance: Normal appearance.   HENT:      Head: Normocephalic and atraumatic.      Nose: No congestion.   Eyes:      Pupils: Pupils are equal, round, and reactive to  light.   Neck:      Musculoskeletal: Normal range of motion and neck supple.   Cardiovascular:      Rate and Rhythm: Normal rate.      Pulses: Normal pulses.      Heart sounds: Normal heart sounds.   Pulmonary:      Effort: Pulmonary effort is normal.   Abdominal:      General: Bowel sounds are normal.   Musculoskeletal:         General: Tenderness present. No swelling.      Comments: Left hip pain   Skin:     Findings: No erythema.   Neurological:      General: No focal deficit present.      Mental Status: He is alert.         Fluids    Intake/Output Summary (Last 24 hours) at 12/1/2019 0805  Last data filed at 12/1/2019 0429  Gross per 24 hour   Intake 640 ml   Output 450 ml   Net 190 ml       Laboratory  Recent Labs     12/01/19  0526   WBC 8.0   RBC 3.37*   HEMOGLOBIN 10.4*   HEMATOCRIT 31.0*   MCV 92.0   MCH 30.9   MCHC 33.5*   RDW 41.7   PLATELETCT 155*   MPV 12.3                       Imaging  DX-FEMUR-2+ LEFT   Final Result      Intraoperative evaluation of femur fracture fixation.      DX-PORTABLE FLUORO > 1 HOUR   Final Result      Intraoperative evaluation of femur fracture fixation.      DX-CHEST-PORTABLE (1 VIEW)   Final Result      No acute cardiopulmonary abnormality. No displaced rib fractures. No pneumothorax.      DX-HIP-COMPLETE - UNILATERAL 2+ LEFT   Final Result      1.  Mild/moderately displaced subtrochanteric proximal left femur fracture.   2.  Degenerative changes of the hips.      DX-FEMUR-1 VIEW LEFT   Final Result      Acute, obliquely oriented fracture of subtrochanteric femoral diaphysis.           Assessment/Plan  * Femur fracture (HCC)  Assessment & Plan  Left sided subtrochanteric femur fracture   POD 3 IMN  Cont with pain control   Pt/ot eval as appropriate  Likely will need SNF        Thrombocytopenia (HCC)  Assessment & Plan  Platelet 155  Worsening  Follow CBC in the morning    Acute blood loss anemia- (present on admission)  Assessment & Plan  Hemoglobin 10.4  Baseline  hemoglobin 14  Related to surgery  Follow CBC closely  Transfuse as needed with target hemoglobin above 7    Marijuana use, continuous- (present on admission)  Assessment & Plan  Daily use     BPH (benign prostatic hyperplasia)  Assessment & Plan  Unknown home medications, cont to monitor for now prn bladder scan as appropriate    Diabetes mellitus with coincident hypertension (HCC)  Assessment & Plan  With associated hyperglycemia and uncontrolled  SSI ordered   accu checks      Essential hypertension  Assessment & Plan  Unknown home medications at this time \  BP stable  Prn hydralazine ordered        VTE prophylaxis: lovenox

## 2019-12-01 NOTE — CARE PLAN
"  Problem: Knowledge Deficit  Goal: Knowledge of disease process/condition, treatment plan, diagnostic tests, and medications will improve  Outcome: PROGRESSING SLOWER THAN EXPECTED  Note:   Patient is disoriented to time and situation. He believes that he shouldn't be mobilizing at all after his injury. RN has continuously reoriented patient to time (he believes his surgery was 1 week ago, couldn't tell nurse the year) and situation (Calls nurse a \"Liar\" & \"Idiot\" when nurse reorients patient to time and weight bearing status). Patient states \"I'll go to a rehab facility if I have to.\" RN documenting patient refusing mobility.      Problem: Pain Management  Goal: Pain level will decrease to patient's comfort goal  Outcome: PROGRESSING SLOWER THAN EXPECTED  Note:   RN has continued to talk to patient about timing pain medications with expected activity. Despite getting pain medication and resting comfortably/sleeping in bed all day. He says that he is too painful to do any activity. RN has given Oxycodone 10 mg twice and encouraged patient to mobilize 1 hour after administration, patient has become agitated and refused mobility both times.      Problem: Mobility  Goal: Risk for activity intolerance will decrease  Outcome: PROGRESSING SLOWER THAN EXPECTED  Note:   Patient is POD#3 IMN. WBAT. RN had a long discussion with patient about needing to improve mobility. Patient is disoriented to time and partially to situation. He believes that he had surgery on Monday 11/25 at 5 pm. RN reoriented patient by explaining he was admitted on 11/27 and had his procedure on 11/28. Patient was angry at this and became agitated and verbally aggressive towards RN. He believes that \"7 days\" is way to early to be ambulating after his injury. RN explained that MD has approved him to be WBAT, he replies \"I don't give a damn what they say.\" So patient is continuing to refuse mobility with nursing staff even with reorientation, " encouragement, pain medications, and education on the importance of mobility.

## 2019-12-01 NOTE — CARE PLAN
Problem: Communication  Goal: The ability to communicate needs accurately and effectively will improve  Outcome: PROGRESSING AS EXPECTED  Note:   Patient, RN, PT, OT all collaborated for the patient to be seen at a time that his pain coverage was adequate enough to participate in therapy. He was able to get to edge of bed. He also got to edge of bed again with RN and CNA but declined standing due to pain and nausea. Patient was actually able to get to the edge of bed with mod a x 2.      Problem: Discharge Barriers/Planning  Goal: Patient's continuum of care needs will be met  Outcome: PROGRESSING AS EXPECTED  Note:   Patient still not tolerating much activity. PT/OT thinking likely rehab placement before home. Discharge plans pending.      Problem: Pain Management  Goal: Pain level will decrease to patient's comfort goal  Outcome: PROGRESSING AS EXPECTED  Note:   Patient seems very comfortable most of the day. Pain with any movement to that left leg. Otherwise sleeps most of the day.

## 2019-12-01 NOTE — CARE PLAN
"  Problem: Discharge Barriers/Planning  Goal: Patient's continuum of care needs will be met  Outcome: PROGRESSING AS EXPECTED  Pt updated on POC, still needs to work with PT/OT to determine if safe to discharge. Pt verbalized understanding but verbalized refusal due to pain when moving. Educated again that pain during ambulation may still be present even after medication, pt still refusing to ambulate.       Problem: Mobility  Goal: Risk for activity intolerance will decrease  Outcome: PROGRESSING SLOWER THAN EXPECTED   Pt encouraged to ambulate with RN, pt refused stated \"get the fuck out of here, you're useless.\" Pt verbalized wanting to sleep, will report/update to dayshift RN regarding mobilization.  "

## 2019-12-01 NOTE — ASSESSMENT & PLAN NOTE
Hemoglobin 10.1-11  Baseline hemoglobin 14  Related to surgery  Follow CBC closely  Transfuse as needed with target hemoglobin above 7  12/5-remain stable. Slight improved   12/6- remain stable

## 2019-12-01 NOTE — PROGRESS NOTES
"   Orthopaedic PA Progress Note    Interval changes:Feeling stronger today    ROS - Patient denies any new issues. No chest pain, dyspnea, or fever.  Pain well controlled.    /92   Pulse 98   Temp 37.3 °C (99.1 °F) (Temporal)   Resp 16   Ht 1.702 m (5' 7\")   Wt 76.2 kg (167 lb 15.9 oz)   SpO2 90%     Patient seen and examined  No acute distress  Breathing non labored  RRR  Surgical dressing is clean, dry, and intact. Patient clearly fires tibialis anterior, EHL, and gastrocnemius/soleus. Sensation is intact to light touch throughout superficial peroneal, deep peroneal, tibial, saphenous, and sural nerve distributions. Strong and palpable 2+ dorsalis pedis and posterior tibial pulses with capillary refill less than 2 seconds. No lower leg tenderness or discomfort.    Recent Labs     12/01/19  0526   WBC 8.0   RBC 3.37*   HEMOGLOBIN 10.4*   HEMATOCRIT 31.0*   MCV 92.0   MCH 30.9   MCHC 33.5*   RDW 41.7   PLATELETCT 155*   MPV 12.3       Active Hospital Problems    Diagnosis   • Acute blood loss anemia [D62]   • Thrombocytopenia (HCC) [D69.6]   • Femur fracture (HCC) [S72.90XA]   • Essential hypertension [I10]   • Diabetes mellitus with coincident hypertension (HCC) [E11.9, I10]   • BPH (benign prostatic hyperplasia) [N40.0]   • Marijuana use, continuous [F12.90]     Assessment/Plan: subtrochanteric left hip fracture POD#2 S/P L hip IMN  Wt bearing status - AT  PT/OT-initiated  Wound care:dressing left in place  Drains - no  Garibay-no  Sutures/Staples out- 10-14 days post operatively  Antibiotics: complete  DVT Prophylaxis- TEDS/SCDs/Foot pumps.   Lovenox: 40 mg daily, Duration-until ambulatory > 150', may transition to ASA BID on discharge  Future Procedures - none planned  Case Coordination for Discharge Planning - Disposition Per Med/PT/OT  Follow-Up: needs appointment with local Orthopedic traumatologist at home ([place], [place]) at  10-14 days post-op for re-evaluation, staple removal and radiographs.  "

## 2019-12-02 PROBLEM — G93.40 ENCEPHALOPATHY ACUTE: Status: ACTIVE | Noted: 2019-12-02

## 2019-12-02 LAB
ANION GAP SERPL CALC-SCNC: 7 MMOL/L (ref 0–11.9)
BASOPHILS # BLD AUTO: 0.2 % (ref 0–1.8)
BASOPHILS # BLD: 0.02 K/UL (ref 0–0.12)
BUN SERPL-MCNC: 27 MG/DL (ref 8–22)
CALCIUM SERPL-MCNC: 8.3 MG/DL (ref 8.5–10.5)
CHLORIDE SERPL-SCNC: 101 MMOL/L (ref 96–112)
CO2 SERPL-SCNC: 26 MMOL/L (ref 20–33)
CREAT SERPL-MCNC: 1.05 MG/DL (ref 0.5–1.4)
EOSINOPHIL # BLD AUTO: 0.12 K/UL (ref 0–0.51)
EOSINOPHIL NFR BLD: 1.5 % (ref 0–6.9)
ERYTHROCYTE [DISTWIDTH] IN BLOOD BY AUTOMATED COUNT: 41 FL (ref 35.9–50)
GLUCOSE BLD-MCNC: 119 MG/DL (ref 65–99)
GLUCOSE BLD-MCNC: 120 MG/DL (ref 65–99)
GLUCOSE BLD-MCNC: 137 MG/DL (ref 65–99)
GLUCOSE BLD-MCNC: 146 MG/DL (ref 65–99)
GLUCOSE SERPL-MCNC: 125 MG/DL (ref 65–99)
HCT VFR BLD AUTO: 30.5 % (ref 42–52)
HGB BLD-MCNC: 10.1 G/DL (ref 14–18)
IMM GRANULOCYTES # BLD AUTO: 0.03 K/UL (ref 0–0.11)
IMM GRANULOCYTES NFR BLD AUTO: 0.4 % (ref 0–0.9)
LYMPHOCYTES # BLD AUTO: 1.29 K/UL (ref 1–4.8)
LYMPHOCYTES NFR BLD: 16 % (ref 22–41)
MCH RBC QN AUTO: 30.4 PG (ref 27–33)
MCHC RBC AUTO-ENTMCNC: 33.1 G/DL (ref 33.7–35.3)
MCV RBC AUTO: 91.9 FL (ref 81.4–97.8)
MONOCYTES # BLD AUTO: 1.01 K/UL (ref 0–0.85)
MONOCYTES NFR BLD AUTO: 12.6 % (ref 0–13.4)
NEUTROPHILS # BLD AUTO: 5.57 K/UL (ref 1.82–7.42)
NEUTROPHILS NFR BLD: 69.3 % (ref 44–72)
NRBC # BLD AUTO: 0 K/UL
NRBC BLD-RTO: 0 /100 WBC
PLATELET # BLD AUTO: 175 K/UL (ref 164–446)
PMV BLD AUTO: 11.6 FL (ref 9–12.9)
POTASSIUM SERPL-SCNC: 4.2 MMOL/L (ref 3.6–5.5)
RBC # BLD AUTO: 3.32 M/UL (ref 4.7–6.1)
SODIUM SERPL-SCNC: 134 MMOL/L (ref 135–145)
WBC # BLD AUTO: 8 K/UL (ref 4.8–10.8)

## 2019-12-02 PROCEDURE — 700102 HCHG RX REV CODE 250 W/ 637 OVERRIDE(OP): Performed by: INTERNAL MEDICINE

## 2019-12-02 PROCEDURE — A9270 NON-COVERED ITEM OR SERVICE: HCPCS | Performed by: HOSPITALIST

## 2019-12-02 PROCEDURE — A9270 NON-COVERED ITEM OR SERVICE: HCPCS | Performed by: INTERNAL MEDICINE

## 2019-12-02 PROCEDURE — 700102 HCHG RX REV CODE 250 W/ 637 OVERRIDE(OP): Performed by: PHYSICIAN ASSISTANT

## 2019-12-02 PROCEDURE — 770006 HCHG ROOM/CARE - MED/SURG/GYN SEMI*

## 2019-12-02 PROCEDURE — 82962 GLUCOSE BLOOD TEST: CPT | Mod: 91

## 2019-12-02 PROCEDURE — 99233 SBSQ HOSP IP/OBS HIGH 50: CPT | Performed by: INTERNAL MEDICINE

## 2019-12-02 PROCEDURE — 700102 HCHG RX REV CODE 250 W/ 637 OVERRIDE(OP): Performed by: HOSPITALIST

## 2019-12-02 PROCEDURE — 700111 HCHG RX REV CODE 636 W/ 250 OVERRIDE (IP): Performed by: ORTHOPAEDIC SURGERY

## 2019-12-02 PROCEDURE — 85025 COMPLETE CBC W/AUTO DIFF WBC: CPT

## 2019-12-02 PROCEDURE — 36415 COLL VENOUS BLD VENIPUNCTURE: CPT

## 2019-12-02 PROCEDURE — A9270 NON-COVERED ITEM OR SERVICE: HCPCS | Performed by: PHYSICIAN ASSISTANT

## 2019-12-02 PROCEDURE — 80048 BASIC METABOLIC PNL TOTAL CA: CPT

## 2019-12-02 RX ADMIN — SENNOSIDES AND DOCUSATE SODIUM 2 TABLET: 8.6; 5 TABLET ORAL at 17:34

## 2019-12-02 RX ADMIN — TAMSULOSIN HYDROCHLORIDE 0.4 MG: 0.4 CAPSULE ORAL at 08:23

## 2019-12-02 RX ADMIN — CYCLOBENZAPRINE 10 MG: 10 TABLET, FILM COATED ORAL at 04:42

## 2019-12-02 RX ADMIN — MAGNESIUM HYDROXIDE 30 ML: 400 SUSPENSION ORAL at 17:34

## 2019-12-02 RX ADMIN — SENNOSIDES AND DOCUSATE SODIUM 2 TABLET: 8.6; 5 TABLET ORAL at 04:42

## 2019-12-02 RX ADMIN — FINASTERIDE 5 MG: 5 TABLET, FILM COATED ORAL at 04:43

## 2019-12-02 RX ADMIN — CYCLOBENZAPRINE 10 MG: 10 TABLET, FILM COATED ORAL at 22:25

## 2019-12-02 RX ADMIN — ENOXAPARIN SODIUM 40 MG: 100 INJECTION SUBCUTANEOUS at 04:43

## 2019-12-02 RX ADMIN — CYCLOBENZAPRINE 10 MG: 10 TABLET, FILM COATED ORAL at 15:00

## 2019-12-02 ASSESSMENT — ENCOUNTER SYMPTOMS: FALLS: 1

## 2019-12-02 NOTE — CARE PLAN
"  Problem: Communication  Goal: The ability to communicate needs accurately and effectively will improve  Outcome: PROGRESSING SLOWER THAN EXPECTED  Note:   RN received call from another nurse on the unit who informed this writer that the patient was on the phone with the Police. When writer entered the room the patient was angrily telling the  that \"No one has checked on him all day\" \"Nobody has given me pain medications\" \"Nobody has tried to help me walk.\" Patient allowed RN to speak with dispatcher. Dispatcher said that patient was unable to tell the dispatcher where he was. RN reassured dispatcher that patient is being taken care of. Dispatcher hung up. RN has been in contact with MD about worsening confusion and agitation. MD Torres wants RN's to limit patients narcotics, waiting on new orders since patient cannot take Tylenol or Ibuprofen. Patient has threatened RN with \"Coming after you with my \" as well as accusing RN of \"Stealing my  so I cant contact my family or .\"      Problem: Knowledge Deficit  Goal: Knowledge of disease process/condition, treatment plan, diagnostic tests, and medications will improve  Outcome: PROGRESSING SLOWER THAN EXPECTED  Note:   Patient is disoriented to time and situation. He believes that he shouldn't be mobilizing at all after his injury. RN has continuously reoriented patient to time (he believes his surgery was 1 week ago, couldn't tell nurse the year) and situation (Calls nurse a \"Liar\" & \"Idiot\" when nurse reorients patient to time and weight bearing status). Patient states \"I'll go to a rehab facility if I have to.\" RN documenting patient refusing mobility.      Problem: Pain Management  Goal: Pain level will decrease to patient's comfort goal  12/1/2019 1552 by Antonio Cisneros R.N.  Outcome: PROGRESSING SLOWER THAN EXPECTED  Note:   Spoke with MD Torres, he wants to limit the use of narcotics used with the patient due to his increasing " "confusion and irritability. MD [facility]/JOS[facility] MIGUEL 650 mg Tylenol q6h, 400 mg Motrin q6h, 10 mg Flexeril q8h. Patient informs RN that his MD in [place] told him never to take ASA, Ibuprofen, or Motrin. Paged MD Torres for more alternatives to pain management, waiting on Orders.   12/1/2019 1415 by Antonio Cisneros R.N.  Outcome: PROGRESSING SLOWER THAN EXPECTED  Note:   RN has continued to talk to patient about timing pain medications with expected activity. Despite getting pain medication and resting comfortably/sleeping in bed all day. He says that he is too painful to do any activity. RN has given Oxycodone 10 mg twice and encouraged patient to mobilize 1 hour after administration, patient has become agitated and refused mobility both times.      Problem: Skin Integrity  Goal: Risk for impaired skin integrity will decrease  Outcome: PROGRESSING SLOWER THAN EXPECTED  Note:   Educated patient about risk for skin breakdown related to immobility, patient still frequently refuses to turn and mobilize.      Problem: Mobility  Goal: Risk for activity intolerance will decrease  Outcome: PROGRESSING SLOWER THAN EXPECTED  Note:   Patient is POD#3 IMN. WBAT. RN had a long discussion with patient about needing to improve mobility. Patient is disoriented to time and partially to situation. He believes that he had surgery on Monday 11/25 at 5 pm. RN reoriented patient by explaining he was admitted on 11/27 and had his procedure on 11/28. Patient was angry at this and became agitated and verbally aggressive towards RN. He believes that \"7 days\" is way to early to be ambulating after his injury. RN explained that MD has approved him to be WBAT, he replies \"I don't give a damn what they say.\" So patient is continuing to refuse mobility with nursing staff even with reorientation, encouragement, pain medications, and education on the importance of mobility.      "

## 2019-12-02 NOTE — CARE PLAN
"  Problem: Communication  Goal: The ability to communicate needs accurately and effectively will improve  Outcome: PROGRESSING SLOWER THAN EXPECTED   Patient is verbally aggressive to staff providing care. Patient refuses to have productive conversations regarding his plan of care and states, \"I'm fine just let me sleep\" or covers ears in response to education.     Problem: Knowledge Deficit  Goal: Knowledge of disease process/condition, treatment plan, diagnostic tests, and medications will improve  Outcome: PROGRESSING SLOWER THAN EXPECTED   Patient does not exhibit adequate knowledge of treatment plan.     Problem: Pain Management  Goal: Pain level will decrease to patient's comfort goal  Outcome: PROGRESSING SLOWER THAN EXPECTED   Unable to assess pain level at this time due to patient refusal to respond to questions. Patient has not requested pain medication or expressed that he is in pain.  Will continue to monitor.     Problem: Mobility  Goal: Risk for activity intolerance will decrease  Outcome: PROGRESSING SLOWER THAN EXPECTED  Patient refusing to mobilize.         "

## 2019-12-02 NOTE — PROGRESS NOTES
Patient demonstrated increased willingness to participate in plan of care throughout the shift. The patient's agitation with staff has decreased substantially but he is still unwilling to participate in education.    Patient was eager to mobilize, however would not follow instructions to utilize the front wheel walker properly. Fall prevention measures and rationale were reinforced. Two staff members were present for mobilization.  Patient encouraged to start with small distances until he is able to ambulate safely.      Patient consistently refuses turns. Waffle cushion in place.

## 2019-12-02 NOTE — PROGRESS NOTES
Hospital Medicine Daily Progress Note    Date of Service  12/2/2019    Chief Complaint  79 y.o. male admitted 11/27/2019 with GLF, hip pain    Hospital Course    79 y.o. male who presented 11/27/2019 with past medical history of diabetes, hypertension who presents with left hip pain and found to have femromal fracture.      Interval Problem Update  POD 4.  The patient has been confused since yesterday.  Narcotic has been discontinued since yesterday for his pain.  He is only being started on Tylenol, ibuprofen, lidocaine patch as needed.   I reviewed labs and WBC 8.0, hemoglobin 10.1, platelet 175.  Patient was seen and examined by me today. Case was discussed with RN and multidisplinary team during rounds. Denies nausea, vomiting, diarrhea.       Consultants/Specialty  ortho    Code Status  fuoll    Disposition  Remain on the floor    Review of Systems  Review of Systems   Unable to perform ROS: Mental acuity   Musculoskeletal: Positive for falls and joint pain.        Left hip pain        Physical Exam  Temp:  [36.9 °C (98.5 °F)-37.3 °C (99.1 °F)] 36.9 °C (98.5 °F)  Pulse:  [] 97  Resp:  [16-19] 17  BP: (116-135)/(74-92) 131/74  SpO2:  [90 %-93 %] 91 %    Physical Exam  Vitals signs reviewed.   Constitutional:       Appearance: Normal appearance.      Comments: Pleasantly confused   HENT:      Head: Normocephalic and atraumatic.      Nose: No congestion.   Eyes:      Pupils: Pupils are equal, round, and reactive to light.   Neck:      Musculoskeletal: Normal range of motion and neck supple.   Cardiovascular:      Rate and Rhythm: Normal rate.      Pulses: Normal pulses.      Heart sounds: Normal heart sounds.   Pulmonary:      Effort: Pulmonary effort is normal.   Abdominal:      General: Bowel sounds are normal.   Musculoskeletal:         General: Tenderness present. No swelling.      Comments: Left hip pain   Skin:     Findings: No erythema.   Neurological:      Mental Status: He is alert.          Fluids    Intake/Output Summary (Last 24 hours) at 12/2/2019 0749  Last data filed at 12/2/2019 0300  Gross per 24 hour   Intake --   Output 500 ml   Net -500 ml       Laboratory  Recent Labs     12/01/19  0526 12/02/19  0010   WBC 8.0 8.0   RBC 3.37* 3.32*   HEMOGLOBIN 10.4* 10.1*   HEMATOCRIT 31.0* 30.5*   MCV 92.0 91.9   MCH 30.9 30.4   MCHC 33.5* 33.1*   RDW 41.7 41.0   PLATELETCT 155* 175   MPV 12.3 11.6     Recent Labs     12/02/19  0010   SODIUM 134*   POTASSIUM 4.2   CHLORIDE 101   CO2 26   GLUCOSE 125*   BUN 27*   CREATININE 1.05   CALCIUM 8.3*                   Imaging  DX-FEMUR-2+ LEFT   Final Result      Intraoperative evaluation of femur fracture fixation.      DX-PORTABLE FLUORO > 1 HOUR   Final Result      Intraoperative evaluation of femur fracture fixation.      DX-CHEST-PORTABLE (1 VIEW)   Final Result      No acute cardiopulmonary abnormality. No displaced rib fractures. No pneumothorax.      DX-HIP-COMPLETE - UNILATERAL 2+ LEFT   Final Result      1.  Mild/moderately displaced subtrochanteric proximal left femur fracture.   2.  Degenerative changes of the hips.      DX-FEMUR-1 VIEW LEFT   Final Result      Acute, obliquely oriented fracture of subtrochanteric femoral diaphysis.           Assessment/Plan  * Femur fracture (HCC)  Assessment & Plan  Left sided subtrochanteric femur fracture   POD 4 IMN  Cont with pain control   Pt/ot eval as appropriate  Likely will need SNF        Encephalopathy acute  Assessment & Plan  Likely related to narcotic  Holding all narcotics  Only on nonnarcotic pain medications  Monitor closely for possible infection    Thrombocytopenia (HCC)  Assessment & Plan  Platelet 175  Improving  Follow CBC in the morning    Acute blood loss anemia- (present on admission)  Assessment & Plan  Hemoglobin 10.1  Worsening  Baseline hemoglobin 14  Related to surgery  Follow CBC closely  Transfuse as needed with target hemoglobin above 7    Marijuana use, continuous- (present  on admission)  Assessment & Plan  Daily use     BPH (benign prostatic hyperplasia)  Assessment & Plan  Unknown home medications, cont to monitor for now prn bladder scan as appropriate    Diabetes mellitus with coincident hypertension (HCC)  Assessment & Plan  With associated hyperglycemia and uncontrolled  SSI ordered   accu checks      Essential hypertension  Assessment & Plan  Unknown home medications at this time \  BP stable  Prn hydralazine ordered        VTE prophylaxis: lovenox

## 2019-12-02 NOTE — ASSESSMENT & PLAN NOTE
Likely related to narcotic ?? And some withdrawal. Also pain possible not well managed  No sings of infection   No neuro deficits   His baseline per his son  Pt not cooperative per his son in the past.   12/4- resolved. Continue to monitor. Consider brain MRI if worsen. Possible withdrawal from marijuana ??

## 2019-12-03 PROBLEM — D69.6 THROMBOCYTOPENIA (HCC): Status: RESOLVED | Noted: 2019-12-01 | Resolved: 2019-12-03

## 2019-12-03 LAB
ALBUMIN SERPL BCP-MCNC: 3.2 G/DL (ref 3.2–4.9)
ALBUMIN/GLOB SERPL: 1.3 G/DL
ALP SERPL-CCNC: 50 U/L (ref 30–99)
ALT SERPL-CCNC: 20 U/L (ref 2–50)
ANION GAP SERPL CALC-SCNC: 10 MMOL/L (ref 0–11.9)
AST SERPL-CCNC: 25 U/L (ref 12–45)
BASOPHILS # BLD AUTO: 0.1 % (ref 0–1.8)
BASOPHILS # BLD: 0.01 K/UL (ref 0–0.12)
BILIRUB SERPL-MCNC: 1.2 MG/DL (ref 0.1–1.5)
BUN SERPL-MCNC: 28 MG/DL (ref 8–22)
CALCIUM SERPL-MCNC: 8.2 MG/DL (ref 8.5–10.5)
CHLORIDE SERPL-SCNC: 102 MMOL/L (ref 96–112)
CO2 SERPL-SCNC: 24 MMOL/L (ref 20–33)
CREAT SERPL-MCNC: 0.98 MG/DL (ref 0.5–1.4)
EOSINOPHIL # BLD AUTO: 0.27 K/UL (ref 0–0.51)
EOSINOPHIL NFR BLD: 3.5 % (ref 0–6.9)
ERYTHROCYTE [DISTWIDTH] IN BLOOD BY AUTOMATED COUNT: 40.7 FL (ref 35.9–50)
GLOBULIN SER CALC-MCNC: 2.5 G/DL (ref 1.9–3.5)
GLUCOSE BLD-MCNC: 117 MG/DL (ref 65–99)
GLUCOSE BLD-MCNC: 161 MG/DL (ref 65–99)
GLUCOSE BLD-MCNC: 174 MG/DL (ref 65–99)
GLUCOSE BLD-MCNC: 203 MG/DL (ref 65–99)
GLUCOSE SERPL-MCNC: 163 MG/DL (ref 65–99)
HCT VFR BLD AUTO: 32.4 % (ref 42–52)
HGB BLD-MCNC: 11.1 G/DL (ref 14–18)
IMM GRANULOCYTES # BLD AUTO: 0.03 K/UL (ref 0–0.11)
IMM GRANULOCYTES NFR BLD AUTO: 0.4 % (ref 0–0.9)
LYMPHOCYTES # BLD AUTO: 1.24 K/UL (ref 1–4.8)
LYMPHOCYTES NFR BLD: 16 % (ref 22–41)
MCH RBC QN AUTO: 31.1 PG (ref 27–33)
MCHC RBC AUTO-ENTMCNC: 34.3 G/DL (ref 33.7–35.3)
MCV RBC AUTO: 90.8 FL (ref 81.4–97.8)
MONOCYTES # BLD AUTO: 1.07 K/UL (ref 0–0.85)
MONOCYTES NFR BLD AUTO: 13.8 % (ref 0–13.4)
NEUTROPHILS # BLD AUTO: 5.12 K/UL (ref 1.82–7.42)
NEUTROPHILS NFR BLD: 66.2 % (ref 44–72)
NRBC # BLD AUTO: 0 K/UL
NRBC BLD-RTO: 0 /100 WBC
PLATELET # BLD AUTO: 212 K/UL (ref 164–446)
PMV BLD AUTO: 11.4 FL (ref 9–12.9)
POTASSIUM SERPL-SCNC: 4 MMOL/L (ref 3.6–5.5)
PROT SERPL-MCNC: 5.7 G/DL (ref 6–8.2)
RBC # BLD AUTO: 3.57 M/UL (ref 4.7–6.1)
SODIUM SERPL-SCNC: 136 MMOL/L (ref 135–145)
WBC # BLD AUTO: 7.7 K/UL (ref 4.8–10.8)

## 2019-12-03 PROCEDURE — 99232 SBSQ HOSP IP/OBS MODERATE 35: CPT | Performed by: INTERNAL MEDICINE

## 2019-12-03 PROCEDURE — 700102 HCHG RX REV CODE 250 W/ 637 OVERRIDE(OP): Performed by: INTERNAL MEDICINE

## 2019-12-03 PROCEDURE — 700102 HCHG RX REV CODE 250 W/ 637 OVERRIDE(OP): Performed by: HOSPITALIST

## 2019-12-03 PROCEDURE — 700111 HCHG RX REV CODE 636 W/ 250 OVERRIDE (IP): Performed by: ORTHOPAEDIC SURGERY

## 2019-12-03 PROCEDURE — 97530 THERAPEUTIC ACTIVITIES: CPT

## 2019-12-03 PROCEDURE — A9270 NON-COVERED ITEM OR SERVICE: HCPCS | Performed by: INTERNAL MEDICINE

## 2019-12-03 PROCEDURE — 700101 HCHG RX REV CODE 250: Performed by: INTERNAL MEDICINE

## 2019-12-03 PROCEDURE — 82962 GLUCOSE BLOOD TEST: CPT | Mod: 91

## 2019-12-03 PROCEDURE — 85025 COMPLETE CBC W/AUTO DIFF WBC: CPT

## 2019-12-03 PROCEDURE — 770006 HCHG ROOM/CARE - MED/SURG/GYN SEMI*

## 2019-12-03 PROCEDURE — A9270 NON-COVERED ITEM OR SERVICE: HCPCS | Performed by: HOSPITALIST

## 2019-12-03 PROCEDURE — 80053 COMPREHEN METABOLIC PANEL: CPT

## 2019-12-03 PROCEDURE — 36415 COLL VENOUS BLD VENIPUNCTURE: CPT

## 2019-12-03 PROCEDURE — A9270 NON-COVERED ITEM OR SERVICE: HCPCS | Performed by: PHYSICIAN ASSISTANT

## 2019-12-03 PROCEDURE — 700102 HCHG RX REV CODE 250 W/ 637 OVERRIDE(OP): Performed by: PHYSICIAN ASSISTANT

## 2019-12-03 RX ORDER — OXYCODONE HYDROCHLORIDE 5 MG/1
5 TABLET ORAL EVERY 4 HOURS PRN
Status: DISCONTINUED | OUTPATIENT
Start: 2019-12-03 | End: 2019-12-18

## 2019-12-03 RX ORDER — OXYCODONE HYDROCHLORIDE 5 MG/1
5 TABLET ORAL EVERY 6 HOURS PRN
Status: DISCONTINUED | OUTPATIENT
Start: 2019-12-03 | End: 2019-12-03

## 2019-12-03 RX ADMIN — CYCLOBENZAPRINE 10 MG: 10 TABLET, FILM COATED ORAL at 20:16

## 2019-12-03 RX ADMIN — ENOXAPARIN SODIUM 40 MG: 100 INJECTION SUBCUTANEOUS at 05:50

## 2019-12-03 RX ADMIN — FINASTERIDE 5 MG: 5 TABLET, FILM COATED ORAL at 05:51

## 2019-12-03 RX ADMIN — POLYETHYLENE GLYCOL 3350 1 PACKET: 17 POWDER, FOR SOLUTION ORAL at 05:51

## 2019-12-03 RX ADMIN — CYCLOBENZAPRINE 10 MG: 10 TABLET, FILM COATED ORAL at 03:09

## 2019-12-03 RX ADMIN — INSULIN HUMAN 2 UNITS: 100 INJECTION, SOLUTION PARENTERAL at 17:33

## 2019-12-03 RX ADMIN — SENNOSIDES AND DOCUSATE SODIUM 2 TABLET: 8.6; 5 TABLET ORAL at 05:50

## 2019-12-03 RX ADMIN — OXYCODONE HYDROCHLORIDE 5 MG: 5 TABLET ORAL at 20:16

## 2019-12-03 RX ADMIN — SENNOSIDES AND DOCUSATE SODIUM 1 TABLET: 8.6; 5 TABLET ORAL at 17:27

## 2019-12-03 RX ADMIN — INSULIN HUMAN 1 UNITS: 100 INJECTION, SOLUTION PARENTERAL at 08:48

## 2019-12-03 RX ADMIN — TAMSULOSIN HYDROCHLORIDE 0.4 MG: 0.4 CAPSULE ORAL at 08:49

## 2019-12-03 RX ADMIN — MAGNESIUM HYDROXIDE 30 ML: 400 SUSPENSION ORAL at 17:27

## 2019-12-03 RX ADMIN — LIDOCAINE 1 PATCH: 50 PATCH TOPICAL at 17:27

## 2019-12-03 RX ADMIN — POLYETHYLENE GLYCOL 3350 1 PACKET: 17 POWDER, FOR SOLUTION ORAL at 17:27

## 2019-12-03 RX ADMIN — INSULIN HUMAN 1 UNITS: 100 INJECTION, SOLUTION PARENTERAL at 13:25

## 2019-12-03 ASSESSMENT — GAIT ASSESSMENTS
DEVIATION: ANTALGIC;BRADYKINETIC;SHUFFLED GAIT
GAIT LEVEL OF ASSIST: MODERATE ASSIST
ASSISTIVE DEVICE: FRONT WHEEL WALKER
DISTANCE (FEET): 4

## 2019-12-03 ASSESSMENT — COGNITIVE AND FUNCTIONAL STATUS - GENERAL
TURNING FROM BACK TO SIDE WHILE IN FLAT BAD: UNABLE
STANDING UP FROM CHAIR USING ARMS: A LOT
CLIMB 3 TO 5 STEPS WITH RAILING: TOTAL
MOBILITY SCORE: 8
WALKING IN HOSPITAL ROOM: A LOT
MOVING FROM LYING ON BACK TO SITTING ON SIDE OF FLAT BED: UNABLE
SUGGESTED CMS G CODE MODIFIER MOBILITY: CM
MOVING TO AND FROM BED TO CHAIR: UNABLE

## 2019-12-03 ASSESSMENT — PAIN SCALES - PAIN ASSESSMENT IN ADVANCED DEMENTIA (PAINAD)
FACIALEXPRESSION: SMILING OR INEXPRESSIVE
CONSOLABILITY: DISTRACTED OR REASSURED BY VOICE/TOUCH
NEGVOCALIZATION: OCCASIONAL MOAN/GROAN, LOW SPEECH, NEGATIVE/DISAPPROVING QUALITY
BODYLANGUAGE: RELAXED
FACIALEXPRESSION: SAD, FRIGHTENED, FROWN
TOTALSCORE: 4
TOTALSCORE: 0
BODYLANGUAGE: RELAXED
CONSOLABILITY: NO NEED TO CONSOLE
BREATHING: OCCASIONAL LABORED BREATHING, SHORT PERIOD OF HYPERVENTILATION
BREATHING: NORMAL

## 2019-12-03 NOTE — CARE PLAN
Problem: Safety  Goal: Will remain free from falls  Outcome: PROGRESSING AS EXPECTED   Fall precautions in place. Call light and personal belongings within reach. Bed alarm in use. Hourly rounding in place. Patient reoriented frequently and offered physical activity.     Problem: Pain Management  Goal: Pain level will decrease to patient's comfort goal  Outcome: PROGRESSING AS EXPECTED   Patient is resting comfortably. Will continue to monitor and administer pain medication as needed.

## 2019-12-03 NOTE — THERAPY
"Physical Therapy Treatment completed.   Bed Mobility:  Supine to Sit: Moderate Assist  Transfers: Sit to Stand: Moderate Assist  Gait: Level Of Assist: Moderate Assist with Front-Wheel Walker       Plan of Care: Will benefit from Physical Therapy 4 times per week  Discharge Recommendations: Equipment: Will Continue to Assess for Equipment Needs. Post-acute therapy Discharge to a transitional care facility for continued skilled therapy services.    Pt seen today for PT treatment session. Upon arrival, pt confused and perseverative on certain topics. Pt difficult to redirect at time. Pt with inconsistent command following throughout session but did follow 50% of commands. Pt completed supine to sit with moderate assist. Sit to stand with FWW with moderate assist. Pt able to take multiple side steps at EOB with FWW and moderate assist from PT. Pt required extensive verbal cues for sequencing. Overall mobility limited by pain. Pt returned to bed with moderate assist. Pt would benefit from ongoing PT intervention while in the acute care setting. Recommend post-acute placement for continued physical therapy services prior to discharge home. Patient can tolerate post-acute therapies at a 5x/week frequency.         See \"Rehab Therapy-Acute\" Patient Summary Report for complete documentation.       "

## 2019-12-03 NOTE — PROGRESS NOTES
Hospital Medicine Daily Progress Note    Date of Service  12/3/2019    Chief Complaint  79 y.o. male admitted 11/27/2019 with ground level fall, hip pain     Hospital Course    79-year-old male past medical history of hypertension, diabetes presenting with ground-level fall, left hip pain.  He was getting out of his car when he slipped on ice and landed on his left hip.  He was unable to bear weight on his left side with significant amount of pain.  Please refer to H&P with Dr. Pacheco for further details. Xr showed Mild/moderately displaced subtrochanteric proximal left femur fracture. He was taken to OR 11/28, by Dr. Barboza and he did have Intramedullary nailing of left femur. He was being more confused 12/2. Narcotic were held. Vitals stable. Lab work unremarkable.        Interval Problem Update  Spoke with his son. His son tells me that this is baseline. His father has been in group home for long time. He smokes marijuana a lot. He is not cooperative most of the time. Family is trying to get him close to [place]. No neuro deficits. Electrolytes wnl. No signs of infection      Consultants/Specialty  Ortho     Code Status  Full code     Disposition  Inpatient     Review of Systems  Review of Systems   Unable to perform ROS: Mental status change        Physical Exam  Temp:  [36.1 °C (97 °F)-36.8 °C (98.3 °F)] 36.1 °C (97 °F)  Pulse:  [88-92] 91  Resp:  [17-18] 18  BP: (103-138)/(67-87) 103/67  SpO2:  [90 %-94 %] 92 %    Physical Exam  Vitals signs and nursing note reviewed.   Constitutional:       General: He is not in acute distress.     Appearance: Normal appearance. He is ill-appearing. He is not toxic-appearing.   HENT:      Head: Normocephalic and atraumatic.      Right Ear: External ear normal.      Left Ear: External ear normal.   Neck:      Musculoskeletal: Normal range of motion. No neck rigidity.   Cardiovascular:      Rate and Rhythm: Normal rate.      Heart sounds: No murmur.   Pulmonary:      Effort: Pulmonary  effort is normal. No respiratory distress.      Breath sounds: No wheezing or rales.   Abdominal:      General: Bowel sounds are normal. There is no distension.      Palpations: Abdomen is soft.      Tenderness: There is no guarding.   Musculoskeletal:      Comments: Left leg slight swelling and tenderness    Neurological:      Mental Status: He is alert. He is disoriented.   Psychiatric:      Comments: Not cooperative          Fluids    Intake/Output Summary (Last 24 hours) at 12/3/2019 1621  Last data filed at 12/3/2019 1300  Gross per 24 hour   Intake 320 ml   Output 150 ml   Net 170 ml       Laboratory  Recent Labs     12/01/19  0526 12/02/19  0010 12/03/19  0818   WBC 8.0 8.0 7.7   RBC 3.37* 3.32* 3.57*   HEMOGLOBIN 10.4* 10.1* 11.1*   HEMATOCRIT 31.0* 30.5* 32.4*   MCV 92.0 91.9 90.8   MCH 30.9 30.4 31.1   MCHC 33.5* 33.1* 34.3   RDW 41.7 41.0 40.7   PLATELETCT 155* 175 212   MPV 12.3 11.6 11.4     Recent Labs     12/02/19  0010 12/03/19  0818   SODIUM 134* 136   POTASSIUM 4.2 4.0   CHLORIDE 101 102   CO2 26 24   GLUCOSE 125* 163*   BUN 27* 28*   CREATININE 1.05 0.98   CALCIUM 8.3* 8.2*                   Imaging  DX-FEMUR-2+ LEFT   Final Result      Intraoperative evaluation of femur fracture fixation.      DX-PORTABLE FLUORO > 1 HOUR   Final Result      Intraoperative evaluation of femur fracture fixation.      DX-CHEST-PORTABLE (1 VIEW)   Final Result      No acute cardiopulmonary abnormality. No displaced rib fractures. No pneumothorax.      DX-HIP-COMPLETE - UNILATERAL 2+ LEFT   Final Result      1.  Mild/moderately displaced subtrochanteric proximal left femur fracture.   2.  Degenerative changes of the hips.      DX-FEMUR-1 VIEW LEFT   Final Result      Acute, obliquely oriented fracture of subtrochanteric femoral diaphysis.           Assessment/Plan  * Femur fracture (HCC)  Assessment & Plan  Left sided subtrochanteric femur fracture   Cont with pain control   Pt/ot eval as appropriate  SNF. Family  asking for transportation to [place]         Encephalopathy acute  Assessment & Plan  Likely related to narcotic ?? And some withdrawal. Also pain possible not well managed  No sings of infection   No neuro deficits   His baseline per his son ??   Continue to monitor. Consider MRI if worsen   Pt not cooperative per his son in the past.     Acute blood loss anemia- (present on admission)  Assessment & Plan  Hemoglobin 10.1-11  Baseline hemoglobin 14  Related to surgery  Follow CBC closely  Transfuse as needed with target hemoglobin above 7    Marijuana use, continuous- (present on admission)  Assessment & Plan  Daily use     BPH (benign prostatic hyperplasia)  Assessment & Plan  Unknown home medications, cont to monitor for now prn bladder scan as appropriate    Diabetes mellitus with coincident hypertension (HCC)  Assessment & Plan  With associated hyperglycemia and uncontrolled  SSI ordered   accu checks  a1c to follow       Essential hypertension  Assessment & Plan  Unknown home medications at this time \  BP stable  Prn hydralazine ordered        VTE prophylaxis: lovenox

## 2019-12-04 PROBLEM — N17.9 AKI (ACUTE KIDNEY INJURY) (HCC): Status: ACTIVE | Noted: 2019-12-04

## 2019-12-04 PROBLEM — H10.31 ACUTE BACTERIAL CONJUNCTIVITIS OF RIGHT EYE: Status: ACTIVE | Noted: 2019-12-04

## 2019-12-04 LAB
ALBUMIN SERPL BCP-MCNC: 3.1 G/DL (ref 3.2–4.9)
ALBUMIN/GLOB SERPL: 1.1 G/DL
ALP SERPL-CCNC: 51 U/L (ref 30–99)
ALT SERPL-CCNC: 26 U/L (ref 2–50)
ANION GAP SERPL CALC-SCNC: 9 MMOL/L (ref 0–11.9)
AST SERPL-CCNC: 28 U/L (ref 12–45)
BASOPHILS # BLD AUTO: 0.2 % (ref 0–1.8)
BASOPHILS # BLD: 0.02 K/UL (ref 0–0.12)
BILIRUB SERPL-MCNC: 1.4 MG/DL (ref 0.1–1.5)
BUN SERPL-MCNC: 49 MG/DL (ref 8–22)
CALCIUM SERPL-MCNC: 8.2 MG/DL (ref 8.5–10.5)
CHLORIDE SERPL-SCNC: 102 MMOL/L (ref 96–112)
CO2 SERPL-SCNC: 26 MMOL/L (ref 20–33)
CREAT SERPL-MCNC: 1.7 MG/DL (ref 0.5–1.4)
EOSINOPHIL # BLD AUTO: 0.15 K/UL (ref 0–0.51)
EOSINOPHIL NFR BLD: 1.7 % (ref 0–6.9)
ERYTHROCYTE [DISTWIDTH] IN BLOOD BY AUTOMATED COUNT: 41.8 FL (ref 35.9–50)
EST. AVERAGE GLUCOSE BLD GHB EST-MCNC: 117 MG/DL
GLOBULIN SER CALC-MCNC: 2.8 G/DL (ref 1.9–3.5)
GLUCOSE BLD-MCNC: 102 MG/DL (ref 65–99)
GLUCOSE BLD-MCNC: 135 MG/DL (ref 65–99)
GLUCOSE SERPL-MCNC: 107 MG/DL (ref 65–99)
HBA1C MFR BLD: 5.7 % (ref 0–5.6)
HCT VFR BLD AUTO: 33.1 % (ref 42–52)
HGB BLD-MCNC: 10.8 G/DL (ref 14–18)
IMM GRANULOCYTES # BLD AUTO: 0.06 K/UL (ref 0–0.11)
IMM GRANULOCYTES NFR BLD AUTO: 0.7 % (ref 0–0.9)
LYMPHOCYTES # BLD AUTO: 1.59 K/UL (ref 1–4.8)
LYMPHOCYTES NFR BLD: 17.6 % (ref 22–41)
MAGNESIUM SERPL-MCNC: 2.4 MG/DL (ref 1.5–2.5)
MCH RBC QN AUTO: 29.9 PG (ref 27–33)
MCHC RBC AUTO-ENTMCNC: 32.6 G/DL (ref 33.7–35.3)
MCV RBC AUTO: 91.7 FL (ref 81.4–97.8)
MONOCYTES # BLD AUTO: 1 K/UL (ref 0–0.85)
MONOCYTES NFR BLD AUTO: 11.1 % (ref 0–13.4)
NEUTROPHILS # BLD AUTO: 6.22 K/UL (ref 1.82–7.42)
NEUTROPHILS NFR BLD: 68.7 % (ref 44–72)
NRBC # BLD AUTO: 0 K/UL
NRBC BLD-RTO: 0 /100 WBC
PHOSPHATE SERPL-MCNC: 4.3 MG/DL (ref 2.5–4.5)
PLATELET # BLD AUTO: 249 K/UL (ref 164–446)
PMV BLD AUTO: 11 FL (ref 9–12.9)
POTASSIUM SERPL-SCNC: 4.4 MMOL/L (ref 3.6–5.5)
PROT SERPL-MCNC: 5.9 G/DL (ref 6–8.2)
RBC # BLD AUTO: 3.61 M/UL (ref 4.7–6.1)
SODIUM SERPL-SCNC: 137 MMOL/L (ref 135–145)
WBC # BLD AUTO: 9 K/UL (ref 4.8–10.8)

## 2019-12-04 PROCEDURE — A9270 NON-COVERED ITEM OR SERVICE: HCPCS | Performed by: INTERNAL MEDICINE

## 2019-12-04 PROCEDURE — 36415 COLL VENOUS BLD VENIPUNCTURE: CPT

## 2019-12-04 PROCEDURE — A9270 NON-COVERED ITEM OR SERVICE: HCPCS | Performed by: PHYSICIAN ASSISTANT

## 2019-12-04 PROCEDURE — 700102 HCHG RX REV CODE 250 W/ 637 OVERRIDE(OP): Performed by: HOSPITALIST

## 2019-12-04 PROCEDURE — 700102 HCHG RX REV CODE 250 W/ 637 OVERRIDE(OP): Performed by: INTERNAL MEDICINE

## 2019-12-04 PROCEDURE — 770006 HCHG ROOM/CARE - MED/SURG/GYN SEMI*

## 2019-12-04 PROCEDURE — 83735 ASSAY OF MAGNESIUM: CPT

## 2019-12-04 PROCEDURE — 700111 HCHG RX REV CODE 636 W/ 250 OVERRIDE (IP): Performed by: ORTHOPAEDIC SURGERY

## 2019-12-04 PROCEDURE — 700101 HCHG RX REV CODE 250: Performed by: INTERNAL MEDICINE

## 2019-12-04 PROCEDURE — 80053 COMPREHEN METABOLIC PANEL: CPT

## 2019-12-04 PROCEDURE — 99232 SBSQ HOSP IP/OBS MODERATE 35: CPT | Performed by: INTERNAL MEDICINE

## 2019-12-04 PROCEDURE — A9270 NON-COVERED ITEM OR SERVICE: HCPCS | Performed by: HOSPITALIST

## 2019-12-04 PROCEDURE — 83036 HEMOGLOBIN GLYCOSYLATED A1C: CPT

## 2019-12-04 PROCEDURE — 700105 HCHG RX REV CODE 258: Performed by: INTERNAL MEDICINE

## 2019-12-04 PROCEDURE — 82962 GLUCOSE BLOOD TEST: CPT

## 2019-12-04 PROCEDURE — 85025 COMPLETE CBC W/AUTO DIFF WBC: CPT

## 2019-12-04 PROCEDURE — 84100 ASSAY OF PHOSPHORUS: CPT

## 2019-12-04 PROCEDURE — 700102 HCHG RX REV CODE 250 W/ 637 OVERRIDE(OP): Performed by: PHYSICIAN ASSISTANT

## 2019-12-04 RX ORDER — ERYTHROMYCIN 5 MG/G
OINTMENT OPHTHALMIC ONCE
Status: DISCONTINUED | OUTPATIENT
Start: 2019-12-04 | End: 2019-12-04

## 2019-12-04 RX ORDER — SODIUM CHLORIDE 9 MG/ML
INJECTION, SOLUTION INTRAVENOUS CONTINUOUS
Status: DISCONTINUED | OUTPATIENT
Start: 2019-12-04 | End: 2019-12-05

## 2019-12-04 RX ORDER — ERYTHROMYCIN 5 MG/G
OINTMENT OPHTHALMIC EVERY 8 HOURS
Status: DISCONTINUED | OUTPATIENT
Start: 2019-12-04 | End: 2019-12-17

## 2019-12-04 RX ADMIN — TAMSULOSIN HYDROCHLORIDE 0.4 MG: 0.4 CAPSULE ORAL at 08:26

## 2019-12-04 RX ADMIN — FINASTERIDE 5 MG: 5 TABLET, FILM COATED ORAL at 04:34

## 2019-12-04 RX ADMIN — ERYTHROMYCIN: 5 OINTMENT OPHTHALMIC at 23:01

## 2019-12-04 RX ADMIN — ERYTHROMYCIN: 5 OINTMENT OPHTHALMIC at 17:54

## 2019-12-04 RX ADMIN — OXYCODONE HYDROCHLORIDE 5 MG: 5 TABLET ORAL at 04:33

## 2019-12-04 RX ADMIN — CYCLOBENZAPRINE 10 MG: 10 TABLET, FILM COATED ORAL at 04:33

## 2019-12-04 RX ADMIN — LIDOCAINE 1 PATCH: 50 PATCH TOPICAL at 17:11

## 2019-12-04 RX ADMIN — OXYCODONE HYDROCHLORIDE 5 MG: 5 TABLET ORAL at 13:15

## 2019-12-04 RX ADMIN — SODIUM CHLORIDE: 9 INJECTION, SOLUTION INTRAVENOUS at 10:39

## 2019-12-04 RX ADMIN — SODIUM CHLORIDE: 9 INJECTION, SOLUTION INTRAVENOUS at 17:11

## 2019-12-04 RX ADMIN — SODIUM CHLORIDE: 9 INJECTION, SOLUTION INTRAVENOUS at 23:04

## 2019-12-04 RX ADMIN — OXYCODONE HYDROCHLORIDE 5 MG: 5 TABLET ORAL at 20:29

## 2019-12-04 RX ADMIN — SENNOSIDES AND DOCUSATE SODIUM 2 TABLET: 8.6; 5 TABLET ORAL at 17:12

## 2019-12-04 RX ADMIN — SENNOSIDES AND DOCUSATE SODIUM 2 TABLET: 8.6; 5 TABLET ORAL at 04:34

## 2019-12-04 RX ADMIN — ENOXAPARIN SODIUM 40 MG: 100 INJECTION SUBCUTANEOUS at 04:34

## 2019-12-04 ASSESSMENT — ENCOUNTER SYMPTOMS
HEADACHES: 0
SHORTNESS OF BREATH: 0
CONSTIPATION: 0
PALPITATIONS: 0
VOMITING: 0
DIZZINESS: 0
ABDOMINAL PAIN: 0
SPUTUM PRODUCTION: 0
DIARRHEA: 0
CHILLS: 0
COUGH: 0
HEARTBURN: 0
DOUBLE VISION: 0
FEVER: 0
BLURRED VISION: 0
NAUSEA: 0

## 2019-12-04 NOTE — ASSESSMENT & PLAN NOTE
Possible poor hydration. No contrast  Avoid nsaids   IVF   Continue to monitor .   Consider renal US if worsen   No urinary retention per RN   12/5- gfr normal. Prerenal. Continue to monitor.

## 2019-12-04 NOTE — CARE PLAN
Problem: Safety  Goal: Will remain free from injury  Outcome: PROGRESSING AS EXPECTED   The pt has his bed alarm on, non-skid socks, is close to the nursing station and is checked frequently to help prevent a fall.     Problem: Bowel/Gastric:  Goal: Normal bowel function is maintained or improved  Outcome: PROGRESSING AS EXPECTED   The pt had a BM after not having on since 11/28     Problem: Respiratory:  Goal: Respiratory status will improve  Outcome: PROGRESSING AS EXPECTED  The pt is able to maintain a normal oxygen level without supplemental oxygen.

## 2019-12-04 NOTE — PROGRESS NOTES
"   Orthopaedic PA Progress Note    Interval changes:Doing well, OOB with walker    ROS - Patient denies any new issues. No chest pain, dyspnea, or fever.  Pain well controlled.    /67   Pulse 91   Temp 36.1 °C (97 °F) (Temporal)   Resp 18   Ht 1.702 m (5' 7\")   Wt 76.2 kg (167 lb 15.9 oz)   SpO2 92%     Patient seen and examined  No acute distress  Breathing non labored  RRR  Surgical dressing is clean, dry, and intact. Patient clearly fires tibialis anterior, EHL, and gastrocnemius/soleus. Sensation is intact to light touch throughout superficial peroneal, deep peroneal, tibial, saphenous, and sural nerve distributions. Strong and palpable 2+ dorsalis pedis and posterior tibial pulses with capillary refill less than 2 seconds. No lower leg tenderness or discomfort.    Recent Labs     12/01/19  0526 12/02/19  0010 12/03/19  0818   WBC 8.0 8.0 7.7   RBC 3.37* 3.32* 3.57*   HEMOGLOBIN 10.4* 10.1* 11.1*   HEMATOCRIT 31.0* 30.5* 32.4*   MCV 92.0 91.9 90.8   MCH 30.9 30.4 31.1   MCHC 33.5* 33.1* 34.3   RDW 41.7 41.0 40.7   PLATELETCT 155* 175 212   MPV 12.3 11.6 11.4     Active Hospital Problems    Diagnosis   • Encephalopathy acute [G93.40]   • Acute blood loss anemia [D62]   • Thrombocytopenia (HCC) [D69.6]   • Femur fracture (HCC) [S72.90XA]   • Essential hypertension [I10]   • Diabetes mellitus with coincident hypertension (HCC) [E11.9, I10]   • BPH (benign prostatic hyperplasia) [N40.0]   • Marijuana use, continuous [F12.90]     Assessment/Plan: subtrochanteric left hip fracture POD#4 S/P L hip IMN  Wt bearing status - AT  PT/OT-initiated  Wound care:dressing left in place  Drains - no  Garibay-no  Sutures/Staples out- 10-14 days post operatively  Antibiotics: complete  DVT Prophylaxis- TEDS/SCDs/Foot pumps.   Lovenox: 40 mg daily, Duration-until ambulatory > 150', may transition to ASA BID on discharge  Future Procedures - none planned  Case Coordination for Discharge Planning - Disposition Per " Med/PT/OT  Follow-Up: needs appointment with local Orthopedic traumatologist at home ([place], [place]) at  10-14 days post-op for re-evaluation, staple removal and radiographs.

## 2019-12-04 NOTE — PROGRESS NOTES
Hospital Medicine Daily Progress Note    Date of Service  12/4/2019    Chief Complaint  79 y.o. male admitted 11/27/2019 with ground level fall, hip pain     Hospital Course    79-year-old male past medical history of hypertension, diabetes presenting with ground-level fall, left hip pain.  He was getting out of his car when he slipped on ice and landed on his left hip.  He was unable to bear weight on his left side with significant amount of pain.  Please refer to H&P with Dr. Pacheco for further details. Xr showed Mild/moderately displaced subtrochanteric proximal left femur fracture. He was taken to OR 11/28, by Dr. Barboza and he did have Intramedullary nailing of left femur. He was being more confused 12/2. Narcotic were held. Vitals stable. Lab work unremarkable. 12/4 noted LADARIUS. IF fluid started. Mentation improved.         Interval Problem Update  Spoke with his son. His son tells me that this is baseline. His father has been in [facility] for long time. He smokes marijuana a lot. He is not cooperative most of the time. Family is trying to get him close to [place]. No neuro deficits. Electrolytes wnl. No signs of infection    12/4- mentation improved. Pt doing better. IV fluid due to LADARIUS,. Hoping transferring to SNF to [place]. Family willing to provide transportation. No urinary retention. Pt tells me that he is feeling better, eating better. Right eye conjunctivitis. Erythromycin for 2 days      Consultants/Specialty  Ortho     Code Status  Full code     Disposition  Inpatient     Review of Systems  Review of Systems   Constitutional: Negative for chills, fever and malaise/fatigue.   Eyes: Negative for blurred vision and double vision.   Respiratory: Negative for cough, sputum production and shortness of breath.    Cardiovascular: Negative for chest pain, palpitations and leg swelling.   Gastrointestinal: Negative for abdominal pain, constipation, diarrhea, heartburn, nausea and vomiting.   Genitourinary: Negative  for dysuria, frequency, hematuria and urgency.        Trouble voiding    Musculoskeletal: Positive for joint pain.   Neurological: Negative for dizziness and headaches.        Physical Exam  Temp:  [36 °C (96.8 °F)-36.3 °C (97.4 °F)] 36.3 °C (97.4 °F)  Pulse:  [64-91] 81  Resp:  [17-18] 17  BP: ()/(64-78) 113/74  SpO2:  [92 %-94 %] 92 %    Physical Exam  Vitals signs and nursing note reviewed.   Constitutional:       General: He is not in acute distress.     Appearance: Normal appearance. He is not ill-appearing or toxic-appearing.   HENT:      Head: Normocephalic and atraumatic.      Right Ear: External ear normal.      Left Ear: External ear normal.   Neck:      Musculoskeletal: Normal range of motion. No neck rigidity.   Cardiovascular:      Rate and Rhythm: Normal rate.      Heart sounds: No murmur.   Pulmonary:      Effort: Pulmonary effort is normal. No respiratory distress.      Breath sounds: No wheezing or rales.   Abdominal:      General: Bowel sounds are normal. There is no distension.      Palpations: Abdomen is soft.      Tenderness: There is no guarding.   Musculoskeletal:      Comments: Left leg slight swelling and tenderness    Neurological:      General: No focal deficit present.      Mental Status: He is alert and oriented to person, place, and time.      Cranial Nerves: No cranial nerve deficit.   Psychiatric:      Comments: Cooperative and pleasant today          Fluids    Intake/Output Summary (Last 24 hours) at 12/4/2019 1414  Last data filed at 12/4/2019 0900  Gross per 24 hour   Intake 80 ml   Output --   Net 80 ml       Laboratory  Recent Labs     12/02/19  0010 12/03/19  0818 12/04/19  0442   WBC 8.0 7.7 9.0   RBC 3.32* 3.57* 3.61*   HEMOGLOBIN 10.1* 11.1* 10.8*   HEMATOCRIT 30.5* 32.4* 33.1*   MCV 91.9 90.8 91.7   MCH 30.4 31.1 29.9   MCHC 33.1* 34.3 32.6*   RDW 41.0 40.7 41.8   PLATELETCT 175 212 249   MPV 11.6 11.4 11.0     Recent Labs     12/02/19  0010 12/03/19  0818  12/04/19  0442   SODIUM 134* 136 137   POTASSIUM 4.2 4.0 4.4   CHLORIDE 101 102 102   CO2 26 24 26   GLUCOSE 125* 163* 107*   BUN 27* 28* 49*   CREATININE 1.05 0.98 1.70*   CALCIUM 8.3* 8.2* 8.2*                   Imaging  DX-FEMUR-2+ LEFT   Final Result      Intraoperative evaluation of femur fracture fixation.      DX-PORTABLE FLUORO > 1 HOUR   Final Result      Intraoperative evaluation of femur fracture fixation.      DX-CHEST-PORTABLE (1 VIEW)   Final Result      No acute cardiopulmonary abnormality. No displaced rib fractures. No pneumothorax.      DX-HIP-COMPLETE - UNILATERAL 2+ LEFT   Final Result      1.  Mild/moderately displaced subtrochanteric proximal left femur fracture.   2.  Degenerative changes of the hips.      DX-FEMUR-1 VIEW LEFT   Final Result      Acute, obliquely oriented fracture of subtrochanteric femoral diaphysis.           Assessment/Plan  * Femur fracture (HCC)  Assessment & Plan  Left sided subtrochanteric femur fracture   Cont with pain control   Pt/ot eval as appropriate  SNF to [place]   Family willing to provide transportation   Ok to provide pain medications if pt is in significant pain       Acute bacterial conjunctivitis of right eye  Assessment & Plan  Erythromycin oint. Continue to monitor     LADARIUS (acute kidney injury) ([facility])  Assessment & Plan  Possible poor hydration. No contrast  Avoid nsaids   IVF   Continue to monitor .   Consider renal US if worsen   No urinary retention per RN     Encephalopathy acute  Assessment & Plan  Likely related to narcotic ?? And some withdrawal. Also pain possible not well managed  No sings of infection   No neuro deficits   His baseline per his son  Pt not cooperative per his son in the past.   12/4- resolved. Continue to monitor. Consider brain MRI if worsen. Possible withdrawal from marijuana ??     Acute blood loss anemia- (present on admission)  Assessment & Plan  Hemoglobin 10.1-11  Baseline hemoglobin 14  Related to surgery  Follow CBC  closely  Transfuse as needed with target hemoglobin above 7    Marijuana use, continuous- (present on admission)  Assessment & Plan  Daily use     BPH (benign prostatic hyperplasia)  Assessment & Plan  Unknown home medications, cont to monitor for now prn bladder scan as appropriate    Diabetes mellitus with coincident hypertension (HCC)  Assessment & Plan  With associated hyperglycemia and uncontrolled  SSI ordered   accu checks  a1c to follow       Essential hypertension  Assessment & Plan  Unknown home medications at this time \  BP stable  Prn hydralazine ordered        VTE prophylaxis: lovenox

## 2019-12-04 NOTE — CARE PLAN
Problem: Communication  Goal: The ability to communicate needs accurately and effectively will improve  Outcome: PROGRESSING AS EXPECTED  Pt uses call light appropriately, can Communicate his needs, review plan of care with PT & MD at bedside, will continue to monitor     Problem: Safety  Goal: Will remain free from injury  Outcome: PROGRESSING AS EXPECTED  Pt resting in bed, low, locked position, socks on, call light in reach, calls for assist to restroom     Problem: Fluid Volume:  Goal: Will maintain balanced intake and output  Outcome: PROGRESSING AS EXPECTED  Started  IV fluids, minimal urine output, oral intake adequate

## 2019-12-05 PROBLEM — G93.40 ENCEPHALOPATHY ACUTE: Status: RESOLVED | Noted: 2019-12-02 | Resolved: 2019-12-05

## 2019-12-05 LAB
ALBUMIN SERPL BCP-MCNC: 3 G/DL (ref 3.2–4.9)
ALBUMIN/GLOB SERPL: 1.2 G/DL
ALP SERPL-CCNC: 50 U/L (ref 30–99)
ALT SERPL-CCNC: 24 U/L (ref 2–50)
ANION GAP SERPL CALC-SCNC: 8 MMOL/L (ref 0–11.9)
AST SERPL-CCNC: 23 U/L (ref 12–45)
BASOPHILS # BLD AUTO: 0.2 % (ref 0–1.8)
BASOPHILS # BLD: 0.02 K/UL (ref 0–0.12)
BILIRUB SERPL-MCNC: 1.2 MG/DL (ref 0.1–1.5)
BUN SERPL-MCNC: 30 MG/DL (ref 8–22)
CALCIUM SERPL-MCNC: 7.7 MG/DL (ref 8.5–10.5)
CHLORIDE SERPL-SCNC: 102 MMOL/L (ref 96–112)
CO2 SERPL-SCNC: 24 MMOL/L (ref 20–33)
CREAT SERPL-MCNC: 1.05 MG/DL (ref 0.5–1.4)
EOSINOPHIL # BLD AUTO: 0.26 K/UL (ref 0–0.51)
EOSINOPHIL NFR BLD: 3.1 % (ref 0–6.9)
ERYTHROCYTE [DISTWIDTH] IN BLOOD BY AUTOMATED COUNT: 42.7 FL (ref 35.9–50)
GLOBULIN SER CALC-MCNC: 2.5 G/DL (ref 1.9–3.5)
GLUCOSE SERPL-MCNC: 131 MG/DL (ref 65–99)
HCT VFR BLD AUTO: 31.1 % (ref 42–52)
HGB BLD-MCNC: 10.1 G/DL (ref 14–18)
IMM GRANULOCYTES # BLD AUTO: 0.05 K/UL (ref 0–0.11)
IMM GRANULOCYTES NFR BLD AUTO: 0.6 % (ref 0–0.9)
LYMPHOCYTES # BLD AUTO: 1.07 K/UL (ref 1–4.8)
LYMPHOCYTES NFR BLD: 12.6 % (ref 22–41)
MCH RBC QN AUTO: 30.1 PG (ref 27–33)
MCHC RBC AUTO-ENTMCNC: 32.5 G/DL (ref 33.7–35.3)
MCV RBC AUTO: 92.8 FL (ref 81.4–97.8)
MONOCYTES # BLD AUTO: 0.76 K/UL (ref 0–0.85)
MONOCYTES NFR BLD AUTO: 9 % (ref 0–13.4)
NEUTROPHILS # BLD AUTO: 6.3 K/UL (ref 1.82–7.42)
NEUTROPHILS NFR BLD: 74.5 % (ref 44–72)
NRBC # BLD AUTO: 0 K/UL
NRBC BLD-RTO: 0 /100 WBC
PLATELET # BLD AUTO: 221 K/UL (ref 164–446)
PMV BLD AUTO: 11.7 FL (ref 9–12.9)
POTASSIUM SERPL-SCNC: 4.3 MMOL/L (ref 3.6–5.5)
PROT SERPL-MCNC: 5.5 G/DL (ref 6–8.2)
RBC # BLD AUTO: 3.35 M/UL (ref 4.7–6.1)
SODIUM SERPL-SCNC: 134 MMOL/L (ref 135–145)
WBC # BLD AUTO: 8.5 K/UL (ref 4.8–10.8)

## 2019-12-05 PROCEDURE — 700102 HCHG RX REV CODE 250 W/ 637 OVERRIDE(OP): Performed by: PHYSICIAN ASSISTANT

## 2019-12-05 PROCEDURE — 770006 HCHG ROOM/CARE - MED/SURG/GYN SEMI*

## 2019-12-05 PROCEDURE — A9270 NON-COVERED ITEM OR SERVICE: HCPCS | Performed by: INTERNAL MEDICINE

## 2019-12-05 PROCEDURE — 99231 SBSQ HOSP IP/OBS SF/LOW 25: CPT | Performed by: INTERNAL MEDICINE

## 2019-12-05 PROCEDURE — 700102 HCHG RX REV CODE 250 W/ 637 OVERRIDE(OP): Performed by: HOSPITALIST

## 2019-12-05 PROCEDURE — 51798 US URINE CAPACITY MEASURE: CPT

## 2019-12-05 PROCEDURE — 700111 HCHG RX REV CODE 636 W/ 250 OVERRIDE (IP): Performed by: ORTHOPAEDIC SURGERY

## 2019-12-05 PROCEDURE — 700102 HCHG RX REV CODE 250 W/ 637 OVERRIDE(OP): Performed by: INTERNAL MEDICINE

## 2019-12-05 PROCEDURE — 85025 COMPLETE CBC W/AUTO DIFF WBC: CPT

## 2019-12-05 PROCEDURE — A9270 NON-COVERED ITEM OR SERVICE: HCPCS | Performed by: HOSPITALIST

## 2019-12-05 PROCEDURE — A9270 NON-COVERED ITEM OR SERVICE: HCPCS | Performed by: PHYSICIAN ASSISTANT

## 2019-12-05 PROCEDURE — 36415 COLL VENOUS BLD VENIPUNCTURE: CPT

## 2019-12-05 PROCEDURE — 80053 COMPREHEN METABOLIC PANEL: CPT

## 2019-12-05 PROCEDURE — 97530 THERAPEUTIC ACTIVITIES: CPT

## 2019-12-05 PROCEDURE — 700101 HCHG RX REV CODE 250: Performed by: INTERNAL MEDICINE

## 2019-12-05 RX ADMIN — ERYTHROMYCIN: 5 OINTMENT OPHTHALMIC at 13:58

## 2019-12-05 RX ADMIN — SENNOSIDES AND DOCUSATE SODIUM 2 TABLET: 8.6; 5 TABLET ORAL at 04:18

## 2019-12-05 RX ADMIN — OXYCODONE HYDROCHLORIDE 5 MG: 5 TABLET ORAL at 19:47

## 2019-12-05 RX ADMIN — TAMSULOSIN HYDROCHLORIDE 0.4 MG: 0.4 CAPSULE ORAL at 06:41

## 2019-12-05 RX ADMIN — ERYTHROMYCIN: 5 OINTMENT OPHTHALMIC at 04:18

## 2019-12-05 RX ADMIN — ERYTHROMYCIN: 5 OINTMENT OPHTHALMIC at 22:00

## 2019-12-05 RX ADMIN — OXYCODONE HYDROCHLORIDE 5 MG: 5 TABLET ORAL at 10:09

## 2019-12-05 RX ADMIN — SENNOSIDES AND DOCUSATE SODIUM 2 TABLET: 8.6; 5 TABLET ORAL at 17:46

## 2019-12-05 RX ADMIN — LIDOCAINE 1 PATCH: 50 PATCH TOPICAL at 17:44

## 2019-12-05 RX ADMIN — ENOXAPARIN SODIUM 40 MG: 100 INJECTION SUBCUTANEOUS at 04:18

## 2019-12-05 RX ADMIN — OXYCODONE HYDROCHLORIDE 5 MG: 5 TABLET ORAL at 14:00

## 2019-12-05 RX ADMIN — FINASTERIDE 5 MG: 5 TABLET, FILM COATED ORAL at 04:18

## 2019-12-05 RX ADMIN — OXYCODONE HYDROCHLORIDE 5 MG: 5 TABLET ORAL at 04:18

## 2019-12-05 ASSESSMENT — COGNITIVE AND FUNCTIONAL STATUS - GENERAL
WALKING IN HOSPITAL ROOM: A LOT
MOBILITY SCORE: 11
MOVING FROM LYING ON BACK TO SITTING ON SIDE OF FLAT BED: A LOT
SUGGESTED CMS G CODE MODIFIER MOBILITY: CL
CLIMB 3 TO 5 STEPS WITH RAILING: TOTAL
MOVING TO AND FROM BED TO CHAIR: A LOT
TURNING FROM BACK TO SIDE WHILE IN FLAT BAD: A LOT
STANDING UP FROM CHAIR USING ARMS: A LOT

## 2019-12-05 ASSESSMENT — ENCOUNTER SYMPTOMS
DOUBLE VISION: 0
PALPITATIONS: 0
SPUTUM PRODUCTION: 0
DIZZINESS: 0
NAUSEA: 0
ABDOMINAL PAIN: 0
DIARRHEA: 0
COUGH: 0
CHILLS: 0
HEARTBURN: 0
FEVER: 0
SHORTNESS OF BREATH: 0
CONSTIPATION: 0
BLURRED VISION: 0
VOMITING: 0
HEADACHES: 0

## 2019-12-05 ASSESSMENT — GAIT ASSESSMENTS
GAIT LEVEL OF ASSIST: MINIMAL ASSIST
DISTANCE (FEET): 15
ASSISTIVE DEVICE: FRONT WHEEL WALKER

## 2019-12-05 NOTE — CARE PLAN
Problem: Communication  Goal: The ability to communicate needs accurately and effectively will improve  Outcome: PROGRESSING AS EXPECTED  Pt uses call light appropriately, can communicate his needs, plan of care reviewed w/ pt, MD, adán, KAMILLE, will continue to monitor     Problem: Fluid Volume:  Goal: Will maintain balanced intake and output  Outcome: PROGRESSING AS EXPECTED  IV fluids Dcd, oral intake adequate, some output but retention, In & out successful, pt reports much less discomfort     Problem: Pain Management  Goal: Pain level will decrease to patient's comfort goal  Outcome: PROGRESSING AS EXPECTED  Pt resting more comfortably now after cath, pain adequately controlled with PO meds

## 2019-12-05 NOTE — PROGRESS NOTES
"   Orthopaedic Progress Note    Interval changes:  Patient doing well  Dressings with min dried sanguinous    ROS - Patient denies any new issues.  Pain well controlled.    /74   Pulse 81   Temp 36.3 °C (97.4 °F) (Temporal)   Resp 17   Ht 1.702 m (5' 7\")   Wt 76.2 kg (167 lb 15.9 oz)   SpO2 92%       Patient seen and examined  No acute distress  Breathing non labored  RRR  Left hip dressings are clean, dry, and intact. Patient clearly fires tibialis anterior, EHL, and gastrocnemius/soleus. Sensation is intact to light touch throughout superficial peroneal, deep peroneal, tibial, saphenous, and sural nerve distributions. Strong and palpable 2+ dorsalis pedis and posterior tibial pulses with capillary refill less than 2 seconds. No lower leg tenderness or discomfort.       Recent Labs     12/02/19  0010 12/03/19  0818 12/04/19  0442   WBC 8.0 7.7 9.0   RBC 3.32* 3.57* 3.61*   HEMOGLOBIN 10.1* 11.1* 10.8*   HEMATOCRIT 30.5* 32.4* 33.1*   MCV 91.9 90.8 91.7   MCH 30.4 31.1 29.9   MCHC 33.1* 34.3 32.6*   RDW 41.0 40.7 41.8   PLATELETCT 175 212 249   MPV 11.6 11.4 11.0       Active Hospital Problems    Diagnosis   • LADARIUS (acute kidney injury) ([facility]) [N17.9]   • Acute bacterial conjunctivitis of right eye [H10.31]   • Encephalopathy acute [G93.40]   • Acute blood loss anemia [D62]   • Femur fracture ([facility]) [S72.90XA]   • Essential hypertension [I10]   • Diabetes mellitus with coincident hypertension ([facility]) [E11.9, I10]   • BPH (benign prostatic hyperplasia) [N40.0]   • Marijuana use, continuous [F12.90]       Assessment/Plan:  Cleared for DC by ortho pending medicine clearance  POD#6 S/P Intramedullary nailing of left femur.   Wt bearing status - WBAT  Wound care/Drains - dressings changed every other day by nursing  Future Procedures - none   Lovenox: Start 11/29, Duration-until ambulatory > 150'  Sutures/Staples out- 10-14 days post operatively  PT/OT-initiated  Antibiotics: completed  DVT Prophylaxis- " TEDS/SCDs/Foot pumps  Garibay-none  Case Coordination for Discharge Planning - Disposition SNF

## 2019-12-05 NOTE — CARE PLAN
Problem: Communication  Goal: The ability to communicate needs accurately and effectively will improve  Outcome: PROGRESSING SLOWER THAN EXPECTED   The pt can become very verbally aggressive and abusive towards staff and had a talk with the supervisors about talking to staff in a demeaning and condescending tone.     Problem: Venous Thromboembolism (VTW)/Deep Vein Thrombosis (DVT) Prevention:  Goal: Patient will participate in Venous Thrombosis (VTE)/Deep Vein Thrombosis (DVT)Prevention Measures  Outcome: PROGRESSING SLOWER THAN EXPECTED   The pt does take the anticoagulant, but refuses the SCDs and has trouble ambulating.

## 2019-12-05 NOTE — DISCHARGE PLANNING
Called Pts insurance Co, [employer] at [phone]. Spoke to Jaime, he states that the pt belongs to [facility] in [place], [place], and a call must be made to them to help initiate SNF transfer.  Their #[phone], Dr. Art, called and left voicemail message with Dr. Art's assistant.

## 2019-12-05 NOTE — PROGRESS NOTES
Sleeping  AVSS  Hct 31  Mobilize  WBAT LLE  DVT proph  D/c planning  [place] out approximately 5 days

## 2019-12-05 NOTE — PROGRESS NOTES
C/o pain  Incisions intact  + DF/PF  SCDs on but not hooked to machine  Mobilize  WBAT LLE  DVT proph  D/c planning

## 2019-12-05 NOTE — DIETARY
Nutrition Services: Weekly Re-Screen Poor PO  Day 8 of admit.  Nazario Cox is a 79 y.o. male with admitting DX of Femur fracture     Pt is currently on diabetic diet. Pt states his appetite is okay. Pt states he prefers liquids and softer food items because he is concerned with his bowel movements. Pt stated he is picking out his meals with the nutrition representative. Pt declined supplements and snacks at this time. Per chart pt PO 0-50%. Wt 11/30: 76.2 kg via bed scale - wt slightly up from admit bed scale wt.     Malnutrition Risk: No criteria noted at this time.     Recommendations/Plan:  1. Encourage intake of meals  2. Document intake of all meals as % taken in ADL's to provide interdisciplinary communication across all shifts.   3. Monitor weight.  4. Nutrition rep will continue to see patient for ongoing meal and snack preferences.  5. Obtain supplement order per RD as needed.    RD following

## 2019-12-05 NOTE — DISCHARGE PLANNING
Spoke to pts son Nazario, and  for pt at [facility] at [phone]. [facility] gave information regarding insurance approved facilities in [place], [place]. Choice form obtained from pts son Nazario  at [phone]. Will contact son with further updates.

## 2019-12-05 NOTE — CARE PLAN
Problem: Nutritional:  Goal: Achieve adequate nutritional intake  Description  Patient will consume ~50% of meals   Outcome: PROGRESSING SLOWER THAN EXPECTED

## 2019-12-05 NOTE — PROGRESS NOTES
Hospital Medicine Daily Progress Note    Date of Service  12/5/2019    Chief Complaint  79 y.o. male admitted 11/27/2019 with ground level fall, hip pain     Hospital Course    79-year-old male past medical history of hypertension, diabetes presenting with ground-level fall, left hip pain.  He was getting out of his car when he slipped on ice and landed on his left hip.  He was unable to bear weight on his left side with significant amount of pain.  Please refer to H&P with Dr. Pacheco for further details. Xr showed Mild/moderately displaced subtrochanteric proximal left femur fracture. He was taken to OR 11/28, by Dr. Barboza and he did have Intramedullary nailing of left femur. He was being more confused 12/2. Narcotic were held. Vitals stable. Lab work unremarkable. 12/4 noted LADARIUS. IF fluid started. Mentation improved.         Interval Problem Update  Spoke with his son. His son tells me that this is baseline. His father has been in penitentiary for long time. He smokes marijuana a lot. He is not cooperative most of the time. Family is trying to get him close to [place]. No neuro deficits. Electrolytes wnl. No signs of infection    12/4- mentation improved. Pt doing better. IV fluid due to LADARIUS,. Hoping transferring to SNF to [place]. Family willing to provide transportation. No urinary retention. Pt tells me that he is feeling better, eating better. Right eye conjunctivitis. Erythromycin for 2 days    12/5- some urinary retention. Pt was not taking flomax 2 weeks before admission. Worse last night from IVF. GFR normal. Pt medical cleared for transfer.     Consultants/Specialty  Ortho     Code Status  Full code     Disposition  Inpatient     Review of Systems  Review of Systems   Constitutional: Negative for chills, fever and malaise/fatigue.   Eyes: Negative for blurred vision and double vision.   Respiratory: Negative for cough, sputum production and shortness of breath.    Cardiovascular: Negative for chest pain,  palpitations and leg swelling.   Gastrointestinal: Negative for abdominal pain, constipation, diarrhea, heartburn, nausea and vomiting.   Genitourinary: Negative for dysuria, frequency, hematuria and urgency.        Trouble voiding    Musculoskeletal: Positive for joint pain.   Neurological: Negative for dizziness and headaches.        Physical Exam  Temp:  [36.3 °C (97.4 °F)-37.1 °C (98.7 °F)] 36.4 °C (97.6 °F)  Pulse:  [79-92] 92  Resp:  [16-17] 17  BP: (113-122)/(73-79) 122/79  SpO2:  [90 %-92 %] 92 %    Physical Exam  Vitals signs and nursing note reviewed.   Constitutional:       General: He is not in acute distress.     Appearance: Normal appearance. He is not ill-appearing or toxic-appearing.   HENT:      Head: Normocephalic and atraumatic.      Right Ear: External ear normal.      Left Ear: External ear normal.   Neck:      Musculoskeletal: Normal range of motion. No neck rigidity.   Cardiovascular:      Rate and Rhythm: Normal rate.      Heart sounds: No murmur.   Pulmonary:      Effort: Pulmonary effort is normal. No respiratory distress.      Breath sounds: No wheezing or rales.   Abdominal:      General: Bowel sounds are normal. There is no distension.      Palpations: Abdomen is soft.      Tenderness: There is no guarding.   Musculoskeletal:      Comments: Left leg slight swelling and tenderness    Neurological:      General: No focal deficit present.      Mental Status: He is alert and oriented to person, place, and time.      Cranial Nerves: No cranial nerve deficit.   Psychiatric:      Comments: Cooperative and pleasant today          Fluids    Intake/Output Summary (Last 24 hours) at 12/5/2019 1404  Last data filed at 12/5/2019 1300  Gross per 24 hour   Intake 1192.5 ml   Output 1975 ml   Net -782.5 ml       Laboratory  Recent Labs     12/03/19  0818 12/04/19  0442 12/05/19  0836   WBC 7.7 9.0 8.5   RBC 3.57* 3.61* 3.35*   HEMOGLOBIN 11.1* 10.8* 10.1*   HEMATOCRIT 32.4* 33.1* 31.1*   MCV 90.8 91.7  92.8   MCH 31.1 29.9 30.1   MCHC 34.3 32.6* 32.5*   RDW 40.7 41.8 42.7   PLATELETCT 212 249 221   MPV 11.4 11.0 11.7     Recent Labs     12/03/19  0818 12/04/19  0442 12/05/19  0836   SODIUM 136 137 134*   POTASSIUM 4.0 4.4 4.3   CHLORIDE 102 102 102   CO2 24 26 24   GLUCOSE 163* 107* 131*   BUN 28* 49* 30*   CREATININE 0.98 1.70* 1.05   CALCIUM 8.2* 8.2* 7.7*                   Imaging  DX-FEMUR-2+ LEFT   Final Result      Intraoperative evaluation of femur fracture fixation.      DX-PORTABLE FLUORO > 1 HOUR   Final Result      Intraoperative evaluation of femur fracture fixation.      DX-CHEST-PORTABLE (1 VIEW)   Final Result      No acute cardiopulmonary abnormality. No displaced rib fractures. No pneumothorax.      DX-HIP-COMPLETE - UNILATERAL 2+ LEFT   Final Result      1.  Mild/moderately displaced subtrochanteric proximal left femur fracture.   2.  Degenerative changes of the hips.      DX-FEMUR-1 VIEW LEFT   Final Result      Acute, obliquely oriented fracture of subtrochanteric femoral diaphysis.           Assessment/Plan  * Femur fracture (HCC)  Assessment & Plan  Left sided subtrochanteric femur fracture   Cont with pain control   Pt/ot eval as appropriate  SNF to [place]   Family willing to provide transportation   Ok to provide pain medications if pt is in significant pain   12/5- primary care in [place] will try to arrange SNF . Continue PT/OT whenever possible while inpatient       Acute bacterial conjunctivitis of right eye  Assessment & Plan  Erythromycin oint. Continue to monitor  12/5- resolving      LADARIUS (acute kidney injury) (HCC)  Assessment & Plan  Possible poor hydration. No contrast  Avoid nsaids   IVF   Continue to monitor .   Consider renal US if worsen   No urinary retention per RN   12/5- gfr normal. Prerenal. Continue to monitor.     Acute blood loss anemia- (present on admission)  Assessment & Plan  Hemoglobin 10.1-11  Baseline hemoglobin 14  Related to surgery  Follow CBC  closely  Transfuse as needed with target hemoglobin above 7  12/5-remain stable. Slight improved     Marijuana use, continuous- (present on admission)  Assessment & Plan  Daily use     BPH (benign prostatic hyperplasia)  Assessment & Plan  Unknown home medications, cont to monitor for now prn bladder scan as appropriate    Diabetes mellitus with coincident hypertension (HCC)  Assessment & Plan  With associated hyperglycemia and uncontrolled  SSI ordered   accu checks  a1c to follow       Essential hypertension  Assessment & Plan  Unknown home medications at this time \  BP stable  Prn hydralazine ordered        VTE prophylaxis: lovenox

## 2019-12-06 PROBLEM — N17.9 AKI (ACUTE KIDNEY INJURY) (HCC): Status: RESOLVED | Noted: 2019-12-04 | Resolved: 2019-12-06

## 2019-12-06 PROBLEM — R33.9 URINARY RETENTION: Status: ACTIVE | Noted: 2019-12-06

## 2019-12-06 PROBLEM — R73.03 PREDIABETES: Status: ACTIVE | Noted: 2019-11-27

## 2019-12-06 PROBLEM — H10.31 ACUTE BACTERIAL CONJUNCTIVITIS OF RIGHT EYE: Status: RESOLVED | Noted: 2019-12-04 | Resolved: 2019-12-06

## 2019-12-06 LAB
ALBUMIN SERPL BCP-MCNC: 2.8 G/DL (ref 3.2–4.9)
ALBUMIN/GLOB SERPL: 1 G/DL
ALP SERPL-CCNC: 47 U/L (ref 30–99)
ALT SERPL-CCNC: 20 U/L (ref 2–50)
ANION GAP SERPL CALC-SCNC: 5 MMOL/L (ref 0–11.9)
AST SERPL-CCNC: 18 U/L (ref 12–45)
BASOPHILS # BLD AUTO: 0.1 % (ref 0–1.8)
BASOPHILS # BLD: 0.01 K/UL (ref 0–0.12)
BILIRUB SERPL-MCNC: 1.2 MG/DL (ref 0.1–1.5)
BUN SERPL-MCNC: 21 MG/DL (ref 8–22)
CALCIUM SERPL-MCNC: 8.2 MG/DL (ref 8.5–10.5)
CHLORIDE SERPL-SCNC: 105 MMOL/L (ref 96–112)
CO2 SERPL-SCNC: 24 MMOL/L (ref 20–33)
CREAT SERPL-MCNC: 0.95 MG/DL (ref 0.5–1.4)
EOSINOPHIL # BLD AUTO: 0.26 K/UL (ref 0–0.51)
EOSINOPHIL NFR BLD: 3.2 % (ref 0–6.9)
ERYTHROCYTE [DISTWIDTH] IN BLOOD BY AUTOMATED COUNT: 43 FL (ref 35.9–50)
GLOBULIN SER CALC-MCNC: 2.8 G/DL (ref 1.9–3.5)
GLUCOSE SERPL-MCNC: 108 MG/DL (ref 65–99)
HCT VFR BLD AUTO: 30.6 % (ref 42–52)
HGB BLD-MCNC: 10.2 G/DL (ref 14–18)
IMM GRANULOCYTES # BLD AUTO: 0.12 K/UL (ref 0–0.11)
IMM GRANULOCYTES NFR BLD AUTO: 1.5 % (ref 0–0.9)
LYMPHOCYTES # BLD AUTO: 1.27 K/UL (ref 1–4.8)
LYMPHOCYTES NFR BLD: 15.7 % (ref 22–41)
MCH RBC QN AUTO: 30.8 PG (ref 27–33)
MCHC RBC AUTO-ENTMCNC: 33.3 G/DL (ref 33.7–35.3)
MCV RBC AUTO: 92.4 FL (ref 81.4–97.8)
MONOCYTES # BLD AUTO: 0.95 K/UL (ref 0–0.85)
MONOCYTES NFR BLD AUTO: 11.7 % (ref 0–13.4)
NEUTROPHILS # BLD AUTO: 5.49 K/UL (ref 1.82–7.42)
NEUTROPHILS NFR BLD: 67.8 % (ref 44–72)
NRBC # BLD AUTO: 0 K/UL
NRBC BLD-RTO: 0 /100 WBC
PLATELET # BLD AUTO: 236 K/UL (ref 164–446)
PMV BLD AUTO: 11.6 FL (ref 9–12.9)
POTASSIUM SERPL-SCNC: 4.3 MMOL/L (ref 3.6–5.5)
PROT SERPL-MCNC: 5.6 G/DL (ref 6–8.2)
RBC # BLD AUTO: 3.31 M/UL (ref 4.7–6.1)
SODIUM SERPL-SCNC: 134 MMOL/L (ref 135–145)
WBC # BLD AUTO: 8.1 K/UL (ref 4.8–10.8)

## 2019-12-06 PROCEDURE — 36415 COLL VENOUS BLD VENIPUNCTURE: CPT

## 2019-12-06 PROCEDURE — 97530 THERAPEUTIC ACTIVITIES: CPT

## 2019-12-06 PROCEDURE — 97535 SELF CARE MNGMENT TRAINING: CPT

## 2019-12-06 PROCEDURE — 700102 HCHG RX REV CODE 250 W/ 637 OVERRIDE(OP): Performed by: PHYSICIAN ASSISTANT

## 2019-12-06 PROCEDURE — A9270 NON-COVERED ITEM OR SERVICE: HCPCS | Performed by: INTERNAL MEDICINE

## 2019-12-06 PROCEDURE — 99231 SBSQ HOSP IP/OBS SF/LOW 25: CPT | Performed by: INTERNAL MEDICINE

## 2019-12-06 PROCEDURE — 700102 HCHG RX REV CODE 250 W/ 637 OVERRIDE(OP): Performed by: HOSPITALIST

## 2019-12-06 PROCEDURE — 80053 COMPREHEN METABOLIC PANEL: CPT

## 2019-12-06 PROCEDURE — 700102 HCHG RX REV CODE 250 W/ 637 OVERRIDE(OP): Performed by: INTERNAL MEDICINE

## 2019-12-06 PROCEDURE — 770006 HCHG ROOM/CARE - MED/SURG/GYN SEMI*

## 2019-12-06 PROCEDURE — 85025 COMPLETE CBC W/AUTO DIFF WBC: CPT

## 2019-12-06 PROCEDURE — A9270 NON-COVERED ITEM OR SERVICE: HCPCS | Performed by: HOSPITALIST

## 2019-12-06 PROCEDURE — 700111 HCHG RX REV CODE 636 W/ 250 OVERRIDE (IP): Performed by: ORTHOPAEDIC SURGERY

## 2019-12-06 PROCEDURE — 700101 HCHG RX REV CODE 250: Performed by: INTERNAL MEDICINE

## 2019-12-06 PROCEDURE — A9270 NON-COVERED ITEM OR SERVICE: HCPCS | Performed by: PHYSICIAN ASSISTANT

## 2019-12-06 RX ADMIN — ERYTHROMYCIN: 5 OINTMENT OPHTHALMIC at 15:53

## 2019-12-06 RX ADMIN — OXYCODONE HYDROCHLORIDE 5 MG: 5 TABLET ORAL at 04:30

## 2019-12-06 RX ADMIN — CYCLOBENZAPRINE 10 MG: 10 TABLET, FILM COATED ORAL at 17:14

## 2019-12-06 RX ADMIN — ERYTHROMYCIN: 5 OINTMENT OPHTHALMIC at 21:18

## 2019-12-06 RX ADMIN — LIDOCAINE 1 PATCH: 50 PATCH TOPICAL at 17:14

## 2019-12-06 RX ADMIN — ENOXAPARIN SODIUM 40 MG: 100 INJECTION SUBCUTANEOUS at 04:31

## 2019-12-06 RX ADMIN — OXYCODONE HYDROCHLORIDE 5 MG: 5 TABLET ORAL at 00:28

## 2019-12-06 RX ADMIN — TAMSULOSIN HYDROCHLORIDE 0.4 MG: 0.4 CAPSULE ORAL at 08:53

## 2019-12-06 RX ADMIN — FINASTERIDE 5 MG: 5 TABLET, FILM COATED ORAL at 04:30

## 2019-12-06 RX ADMIN — OXYCODONE HYDROCHLORIDE 5 MG: 5 TABLET ORAL at 15:53

## 2019-12-06 RX ADMIN — ERYTHROMYCIN: 5 OINTMENT OPHTHALMIC at 04:30

## 2019-12-06 RX ADMIN — SENNOSIDES AND DOCUSATE SODIUM 2 TABLET: 8.6; 5 TABLET ORAL at 04:30

## 2019-12-06 RX ADMIN — OXYCODONE HYDROCHLORIDE 5 MG: 5 TABLET ORAL at 21:17

## 2019-12-06 ASSESSMENT — COGNITIVE AND FUNCTIONAL STATUS - GENERAL
TOILETING: A LOT
SUGGESTED CMS G CODE MODIFIER DAILY ACTIVITY: CK
HELP NEEDED FOR BATHING: A LOT
PERSONAL GROOMING: A LITTLE
DRESSING REGULAR UPPER BODY CLOTHING: A LITTLE
DRESSING REGULAR LOWER BODY CLOTHING: A LOT
DAILY ACTIVITIY SCORE: 16

## 2019-12-06 ASSESSMENT — ENCOUNTER SYMPTOMS
HEADACHES: 0
CHILLS: 0
SPUTUM PRODUCTION: 0
VOMITING: 0
SHORTNESS OF BREATH: 0
DIZZINESS: 0
ABDOMINAL PAIN: 0
BLURRED VISION: 0
DOUBLE VISION: 0
NAUSEA: 0
FEVER: 0
DIARRHEA: 0
HEARTBURN: 0
CONSTIPATION: 0
COUGH: 0
PALPITATIONS: 0

## 2019-12-06 NOTE — DISCHARGE PLANNING
Received Choice form at 1123  Agency/Facility Name:     [facility]   P: [phone]  F: [phone]    [facility]  P: [phone]  F:[phone]    [facility]   P:  [phone]  F: [phone]    [facility]   P: [phone]  Voicemail left with admissions to obtain fax number for referral.     Referral sent per Choice form at [phone]

## 2019-12-06 NOTE — THERAPY
"Physical Therapy Treatment completed.   Bed Mobility:  Supine to Sit: Minimal Assist(w/HOB elevated and use of railing)  Transfers: Sit to Stand: Minimal Assist(from EOB->FWW)  Gait: Level Of Assist: Minimal Assist with Front-Wheel Walker, 15' x 1. Distance limited by patient.       Plan of Care: Will benefit from Physical Therapy 4 times per week  Discharge Recommendations: Equipment: Will Continue to Assess for Equipment Needs. Post-acute therapy Discharge to a transitional care facility for continued skilled therapy services.     See \"Rehab Therapy-Acute\" Patient Summary Report for complete documentation.       "

## 2019-12-06 NOTE — PROGRESS NOTES
"   Orthopaedic Progress Note    Interval changes:  Patient doing well  Dressings changed by nursing    ROS - Patient denies any new issues.  Pain well controlled.    BP (!) 170/99   Pulse 86   Temp 36.4 °C (97.6 °F) (Temporal)   Resp 17   Ht 1.702 m (5' 7.01\")   Wt 76.2 kg (167 lb 15.9 oz)   SpO2 94%       Patient seen and examined  No acute distress  Breathing non labored  RRR  Left hip dressings are clean, dry, and intact. Patient clearly fires tibialis anterior, EHL, and gastrocnemius/soleus. Sensation is intact to light touch throughout superficial peroneal, deep peroneal, tibial, saphenous, and sural nerve distributions. Strong and palpable 2+ dorsalis pedis and posterior tibial pulses with capillary refill less than 2 seconds. No lower leg tenderness or discomfort.       Recent Labs     12/03/19  0818 12/04/19  0442 12/05/19  0836   WBC 7.7 9.0 8.5   RBC 3.57* 3.61* 3.35*   HEMOGLOBIN 11.1* 10.8* 10.1*   HEMATOCRIT 32.4* 33.1* 31.1*   MCV 90.8 91.7 92.8   MCH 31.1 29.9 30.1   MCHC 34.3 32.6* 32.5*   RDW 40.7 41.8 42.7   PLATELETCT 212 249 221   MPV 11.4 11.0 11.7       Active Hospital Problems    Diagnosis   • LADARIUS (acute kidney injury) ([facility]) [N17.9]   • Acute bacterial conjunctivitis of right eye [H10.31]   • Acute blood loss anemia [D62]   • Femur fracture ([facility]) [S72.90XA]   • Essential hypertension [I10]   • Diabetes mellitus with coincident hypertension ([facility]) [E11.9, I10]   • BPH (benign prostatic hyperplasia) [N40.0]   • Marijuana use, continuous [F12.90]       Assessment/Plan:  Cleared for DC by ortho pending medicine clearance  POD#7 S/P Intramedullary nailing of left femur.   Wt bearing status - WBAT  Wound care/Drains - dressings changed every other day by nursing  Future Procedures - none   Lovenox: Start 11/29, Duration-until ambulatory > 150'  Sutures/Staples out- 12-14 days post operatively  PT/OT-initiated  Antibiotics: completed  DVT Prophylaxis- TEDS/SCDs/Foot pumps  Garibay-none  Case " Coordination for Discharge Planning - Disposition SNF

## 2019-12-07 PROCEDURE — 700102 HCHG RX REV CODE 250 W/ 637 OVERRIDE(OP): Performed by: INTERNAL MEDICINE

## 2019-12-07 PROCEDURE — 700102 HCHG RX REV CODE 250 W/ 637 OVERRIDE(OP): Performed by: HOSPITALIST

## 2019-12-07 PROCEDURE — A9270 NON-COVERED ITEM OR SERVICE: HCPCS | Performed by: HOSPITALIST

## 2019-12-07 PROCEDURE — 770006 HCHG ROOM/CARE - MED/SURG/GYN SEMI*

## 2019-12-07 PROCEDURE — 99231 SBSQ HOSP IP/OBS SF/LOW 25: CPT | Performed by: INTERNAL MEDICINE

## 2019-12-07 PROCEDURE — 700102 HCHG RX REV CODE 250 W/ 637 OVERRIDE(OP): Performed by: PHYSICIAN ASSISTANT

## 2019-12-07 PROCEDURE — A9270 NON-COVERED ITEM OR SERVICE: HCPCS | Performed by: PHYSICIAN ASSISTANT

## 2019-12-07 PROCEDURE — 700101 HCHG RX REV CODE 250: Performed by: INTERNAL MEDICINE

## 2019-12-07 PROCEDURE — A9270 NON-COVERED ITEM OR SERVICE: HCPCS | Performed by: INTERNAL MEDICINE

## 2019-12-07 PROCEDURE — 700111 HCHG RX REV CODE 636 W/ 250 OVERRIDE (IP): Performed by: ORTHOPAEDIC SURGERY

## 2019-12-07 RX ADMIN — CYCLOBENZAPRINE 10 MG: 10 TABLET, FILM COATED ORAL at 02:32

## 2019-12-07 RX ADMIN — OXYCODONE HYDROCHLORIDE 5 MG: 5 TABLET ORAL at 15:39

## 2019-12-07 RX ADMIN — TAMSULOSIN HYDROCHLORIDE 0.4 MG: 0.4 CAPSULE ORAL at 10:12

## 2019-12-07 RX ADMIN — FINASTERIDE 5 MG: 5 TABLET, FILM COATED ORAL at 04:01

## 2019-12-07 RX ADMIN — OXYCODONE HYDROCHLORIDE 5 MG: 5 TABLET ORAL at 04:01

## 2019-12-07 RX ADMIN — ERYTHROMYCIN: 5 OINTMENT OPHTHALMIC at 04:01

## 2019-12-07 RX ADMIN — SENNOSIDES AND DOCUSATE SODIUM 2 TABLET: 8.6; 5 TABLET ORAL at 04:00

## 2019-12-07 RX ADMIN — CYCLOBENZAPRINE 10 MG: 10 TABLET, FILM COATED ORAL at 20:03

## 2019-12-07 RX ADMIN — ERYTHROMYCIN: 5 OINTMENT OPHTHALMIC at 15:38

## 2019-12-07 RX ADMIN — ENOXAPARIN SODIUM 40 MG: 100 INJECTION SUBCUTANEOUS at 04:01

## 2019-12-07 RX ADMIN — ERYTHROMYCIN: 5 OINTMENT OPHTHALMIC at 20:03

## 2019-12-07 RX ADMIN — LIDOCAINE 1 PATCH: 50 PATCH TOPICAL at 15:38

## 2019-12-07 ASSESSMENT — ENCOUNTER SYMPTOMS
SHORTNESS OF BREATH: 0
COUGH: 0
DOUBLE VISION: 0
CONSTIPATION: 0
CHILLS: 0
DIARRHEA: 0
BLURRED VISION: 0
FEVER: 0
VOMITING: 0
ABDOMINAL PAIN: 0
HEARTBURN: 0
PALPITATIONS: 0
DIZZINESS: 0
SPUTUM PRODUCTION: 0
HEADACHES: 0
NAUSEA: 0

## 2019-12-07 NOTE — PROGRESS NOTES
Hospital Medicine Daily Progress Note    Date of Service  12/7/2019    Chief Complaint  79 y.o. male admitted 11/27/2019 with ground level fall, hip pain     Hospital Course    79-year-old male past medical history of hypertension, diabetes presenting with ground-level fall, left hip pain.  He was getting out of his car when he slipped on ice and landed on his left hip.  He was unable to bear weight on his left side with significant amount of pain.  Please refer to H&P with Dr. Pacheco for further details. Xr showed Mild/moderately displaced subtrochanteric proximal left femur fracture. He was taken to OR 11/28, by Dr. Barboza and he did have Intramedullary nailing of left femur. He was being more confused 12/2. Narcotic were held. Vitals stable. Lab work unremarkable. 12/4 noted LADARIUS. IF fluid started. Mentation improved.         Interval Problem Update  Spoke with his son. His son tells me that this is baseline. His father has been in nursing home for long time. He smokes marijuana a lot. He is not cooperative most of the time. Family is trying to get him close to [place]. No neuro deficits. Electrolytes wnl. No signs of infection    12/4- mentation improved. Pt doing better. IV fluid due to LADARIUS,. Hoping transferring to SNF to [place]. Family willing to provide transportation. No urinary retention. Pt tells me that he is feeling better, eating better. Right eye conjunctivitis. Erythromycin for 2 days    12/5- some urinary retention. Pt was not taking flomax 2 weeks before admission. Worse last night from IVF. GFR normal. Pt medical cleared for transfer.   12/6- feeling better, after shah was placed. No complains. Pending placement.   12/7- feeling better after shah placed. He has no concerns. No event overnight. Pending placement     Consultants/Specialty  Ortho     Code Status  Full code     Disposition  Inpatient     Review of Systems  Review of Systems   Constitutional: Negative for chills, fever and malaise/fatigue.    Eyes: Negative for blurred vision and double vision.   Respiratory: Negative for cough, sputum production and shortness of breath.    Cardiovascular: Negative for chest pain, palpitations and leg swelling.   Gastrointestinal: Negative for abdominal pain, constipation, diarrhea, heartburn, nausea and vomiting.   Genitourinary: Negative for dysuria, frequency, hematuria and urgency.        Trouble voiding    Musculoskeletal: Positive for joint pain.   Neurological: Negative for dizziness and headaches.        Physical Exam  Temp:  [36.2 °C (97.1 °F)-36.8 °C (98.2 °F)] 36.2 °C (97.1 °F)  Pulse:  [77-89] 81  Resp:  [16-18] 17  BP: (117-137)/(70-88) 136/70  SpO2:  [90 %-95 %] 90 %    Physical Exam  Vitals signs and nursing note reviewed.   Constitutional:       General: He is not in acute distress.     Appearance: Normal appearance. He is not ill-appearing or toxic-appearing.   HENT:      Head: Normocephalic and atraumatic.      Right Ear: External ear normal.      Left Ear: External ear normal.   Neck:      Musculoskeletal: Normal range of motion. No neck rigidity.   Cardiovascular:      Rate and Rhythm: Normal rate.      Heart sounds: No murmur.   Pulmonary:      Effort: Pulmonary effort is normal. No respiratory distress.      Breath sounds: No wheezing or rales.   Abdominal:      General: Bowel sounds are normal. There is no distension.      Palpations: Abdomen is soft.      Tenderness: There is no guarding.   Musculoskeletal:      Comments: Left leg slight swelling and tenderness    Neurological:      General: No focal deficit present.      Mental Status: He is alert and oriented to person, place, and time.      Cranial Nerves: No cranial nerve deficit.   Psychiatric:         Mood and Affect: Mood normal.         Behavior: Behavior normal.         Thought Content: Thought content normal.         Judgment: Judgment normal.      Comments: Cooperative and pleasant          Fluids    Intake/Output Summary (Last 24  hours) at 12/7/2019 1322  Last data filed at 12/7/2019 0235  Gross per 24 hour   Intake --   Output 1600 ml   Net -1600 ml       Laboratory  Recent Labs     12/05/19  0836 12/06/19  0507   WBC 8.5 8.1   RBC 3.35* 3.31*   HEMOGLOBIN 10.1* 10.2*   HEMATOCRIT 31.1* 30.6*   MCV 92.8 92.4   MCH 30.1 30.8   MCHC 32.5* 33.3*   RDW 42.7 43.0   PLATELETCT 221 236   MPV 11.7 11.6     Recent Labs     12/05/19  0836 12/06/19  0507   SODIUM 134* 134*   POTASSIUM 4.3 4.3   CHLORIDE 102 105   CO2 24 24   GLUCOSE 131* 108*   BUN 30* 21   CREATININE 1.05 0.95   CALCIUM 7.7* 8.2*                   Imaging  DX-FEMUR-2+ LEFT   Final Result      Intraoperative evaluation of femur fracture fixation.      DX-PORTABLE FLUORO > 1 HOUR   Final Result      Intraoperative evaluation of femur fracture fixation.      DX-CHEST-PORTABLE (1 VIEW)   Final Result      No acute cardiopulmonary abnormality. No displaced rib fractures. No pneumothorax.      DX-HIP-COMPLETE - UNILATERAL 2+ LEFT   Final Result      1.  Mild/moderately displaced subtrochanteric proximal left femur fracture.   2.  Degenerative changes of the hips.      DX-FEMUR-1 VIEW LEFT   Final Result      Acute, obliquely oriented fracture of subtrochanteric femoral diaphysis.           Assessment/Plan  * Femur fracture (HCC)  Assessment & Plan  Left sided subtrochanteric femur fracture   Cont with pain control   Pt/ot eval as appropriate  SNF to [place]   Family willing to provide transportation   Ok to provide pain medications if pt is in significant pain   12/5- primary care in [place] will try to arrange SNF . Continue PT/OT whenever possible while inpatient       Urinary retention  Assessment & Plan  On shah.   Will possible attempt to remove shah in couple of days if pt still here, otherwise, he will be dc with shah if snf is available  No signs of UTI      Acute blood loss anemia- (present on admission)  Assessment & Plan  Hemoglobin 10.1-11  Baseline hemoglobin 14  Related to  surgery  Follow CBC closely  Transfuse as needed with target hemoglobin above 7  12/5-remain stable. Slight improved   12/6- remain stable     Marijuana use, continuous- (present on admission)  Assessment & Plan  Daily use     BPH (benign prostatic hyperplasia)  Assessment & Plan  Continue tamsulosin and finasteride   Continue shah   He will need to follow up with urology in [place]     Prediabetes  Assessment & Plan  Pt has no diabetes. He has prediabetes. a1c 5.7   He is 78 y/o. no need for metformin which has more side effects instead of benefits   Discontinue accu-check  Diet controled       Essential hypertension  Assessment & Plan  Unknown home medications at this time \  BP stable  Prn hydralazine ordered        VTE prophylaxis: lovenox

## 2019-12-07 NOTE — PROGRESS NOTES
Patient Was straight cath'ed twice in a row last night, and this am was unable to void. Garibay placed per order, with 600ml output after placement.

## 2019-12-07 NOTE — PROGRESS NOTES
Hospital Medicine Daily Progress Note    Date of Service  12/6/2019    Chief Complaint  79 y.o. male admitted 11/27/2019 with ground level fall, hip pain     Hospital Course    79-year-old male past medical history of hypertension, diabetes presenting with ground-level fall, left hip pain.  He was getting out of his car when he slipped on ice and landed on his left hip.  He was unable to bear weight on his left side with significant amount of pain.  Please refer to H&P with Dr. Pacheco for further details. Xr showed Mild/moderately displaced subtrochanteric proximal left femur fracture. He was taken to OR 11/28, by Dr. Barboza and he did have Intramedullary nailing of left femur. He was being more confused 12/2. Narcotic were held. Vitals stable. Lab work unremarkable. 12/4 noted LADARIUS. IF fluid started. Mentation improved.         Interval Problem Update  Spoke with his son. His son tells me that this is baseline. His father has been in alf for long time. He smokes marijuana a lot. He is not cooperative most of the time. Family is trying to get him close to [place]. No neuro deficits. Electrolytes wnl. No signs of infection    12/4- mentation improved. Pt doing better. IV fluid due to LADARIUS,. Hoping transferring to SNF to [place]. Family willing to provide transportation. No urinary retention. Pt tells me that he is feeling better, eating better. Right eye conjunctivitis. Erythromycin for 2 days    12/5- some urinary retention. Pt was not taking flomax 2 weeks before admission. Worse last night from IVF. GFR normal. Pt medical cleared for transfer.   12/6- feeling better, after shah was placed. No complains. Pending placement.     Consultants/Specialty  Ortho     Code Status  Full code     Disposition  Inpatient     Review of Systems  Review of Systems   Constitutional: Negative for chills, fever and malaise/fatigue.   Eyes: Negative for blurred vision and double vision.   Respiratory: Negative for cough, sputum  production and shortness of breath.    Cardiovascular: Negative for chest pain, palpitations and leg swelling.   Gastrointestinal: Negative for abdominal pain, constipation, diarrhea, heartburn, nausea and vomiting.   Genitourinary: Negative for dysuria, frequency, hematuria and urgency.        Trouble voiding    Musculoskeletal: Positive for joint pain.   Neurological: Negative for dizziness and headaches.        Physical Exam  Temp:  [36.2 °C (97.1 °F)-36.3 °C (97.4 °F)] 36.3 °C (97.4 °F)  Pulse:  [76-88] 85  Resp:  [17-18] 17  BP: (115-136)/(79-89) 136/89  SpO2:  [92 %-95 %] 95 %    Physical Exam  Vitals signs and nursing note reviewed.   Constitutional:       General: He is not in acute distress.     Appearance: Normal appearance. He is not ill-appearing or toxic-appearing.   HENT:      Head: Normocephalic and atraumatic.      Right Ear: External ear normal.      Left Ear: External ear normal.   Neck:      Musculoskeletal: Normal range of motion. No neck rigidity.   Cardiovascular:      Rate and Rhythm: Normal rate.      Heart sounds: No murmur.   Pulmonary:      Effort: Pulmonary effort is normal. No respiratory distress.      Breath sounds: No wheezing or rales.   Abdominal:      General: Bowel sounds are normal. There is no distension.      Palpations: Abdomen is soft.      Tenderness: There is no guarding.   Musculoskeletal:      Comments: Left leg slight swelling and tenderness    Neurological:      General: No focal deficit present.      Mental Status: He is alert and oriented to person, place, and time.      Cranial Nerves: No cranial nerve deficit.   Psychiatric:      Comments: Cooperative and pleasant today          Fluids    Intake/Output Summary (Last 24 hours) at 12/6/2019 1631  Last data filed at 12/6/2019 0221  Gross per 24 hour   Intake 120 ml   Output 1275 ml   Net -1155 ml       Laboratory  Recent Labs     12/04/19  0442 12/05/19  0836 12/06/19  0507   WBC 9.0 8.5 8.1   RBC 3.61* 3.35* 3.31*    HEMOGLOBIN 10.8* 10.1* 10.2*   HEMATOCRIT 33.1* 31.1* 30.6*   MCV 91.7 92.8 92.4   MCH 29.9 30.1 30.8   MCHC 32.6* 32.5* 33.3*   RDW 41.8 42.7 43.0   PLATELETCT 249 221 236   MPV 11.0 11.7 11.6     Recent Labs     12/04/19  0442 12/05/19  0836 12/06/19  0507   SODIUM 137 134* 134*   POTASSIUM 4.4 4.3 4.3   CHLORIDE 102 102 105   CO2 26 24 24   GLUCOSE 107* 131* 108*   BUN 49* 30* 21   CREATININE 1.70* 1.05 0.95   CALCIUM 8.2* 7.7* 8.2*                   Imaging  DX-FEMUR-2+ LEFT   Final Result      Intraoperative evaluation of femur fracture fixation.      DX-PORTABLE FLUORO > 1 HOUR   Final Result      Intraoperative evaluation of femur fracture fixation.      DX-CHEST-PORTABLE (1 VIEW)   Final Result      No acute cardiopulmonary abnormality. No displaced rib fractures. No pneumothorax.      DX-HIP-COMPLETE - UNILATERAL 2+ LEFT   Final Result      1.  Mild/moderately displaced subtrochanteric proximal left femur fracture.   2.  Degenerative changes of the hips.      DX-FEMUR-1 VIEW LEFT   Final Result      Acute, obliquely oriented fracture of subtrochanteric femoral diaphysis.           Assessment/Plan  * Femur fracture (HCC)  Assessment & Plan  Left sided subtrochanteric femur fracture   Cont with pain control   Pt/ot eval as appropriate  SNF to [place]   Family willing to provide transportation   Ok to provide pain medications if pt is in significant pain   12/5- primary care in [place] will try to arrange SNF . Continue PT/OT whenever possible while inpatient       Urinary retention  Assessment & Plan  On shah.   Will possible attempt to remove shah in couple of days if pt still here, otherwise, he will be dc with shah if snf is available  No signs of UTI      Acute blood loss anemia- (present on admission)  Assessment & Plan  Hemoglobin 10.1-11  Baseline hemoglobin 14  Related to surgery  Follow CBC closely  Transfuse as needed with target hemoglobin above 7  12/5-remain stable. Slight improved   12/6-  remain stable     Marijuana use, continuous- (present on admission)  Assessment & Plan  Daily use     BPH (benign prostatic hyperplasia)  Assessment & Plan  Continue tamsulosin and finasteride   Continue shah   He will need to follow up with urology in [place]     Prediabetes  Assessment & Plan  Pt has no diabetes. He has prediabetes. a1c 5.7   He is 80 y/o. no need for metformin which has more side effects instead of benefits   Discontinue accu-check  Diet controled       Essential hypertension  Assessment & Plan  Unknown home medications at this time \  BP stable  Prn hydralazine ordered        VTE prophylaxis: lovenox

## 2019-12-07 NOTE — THERAPY
"Occupational Therapy Treatment completed with focus on ADLs, ADL transfers and patient education.  Functional Status:  Pt was seen for Occupational Therapy treatment today, see Therapy Kardex for details.Treatment included education in breath control with activity and at rest, self pacing techs and energy conservation for pain management. Educated pt in safety awareness techs as well. Pt was easily frustrated and resisted movement due to \"causing pain\". Pt can be dramatic as demo needing encouragement to fully participate. Pt did agree to get to EOB and attempt LB dressing.  Pt demonstrated Min A for UB dressing, Mod A for LB dressing seated EOB using AE. Pt did oral hygiene and grooming long sitting in bed due to pain issues and fatigue. \"I can't do it sitting any longer at this edge of bed\".  Pt needed set up for H/G seated in bed.  Pt declined to stand for clothing management stating,  \"I have his shah in now and I'm not going to get my pants up over my hips today\".  Pt required Min A with encouragement for BTB. Pt was left up in bed ,call light in reach, bedside table in front of him and nursing is aware. RN updated on OT treatment findings.  Continue Occupational Therapy services as per plan.    Plan of Care: Will benefit from Occupational Therapy 3 times per week  Discharge Recommendations:  Equipment Will Continue to Assess for Equipment Needs. Post-acute therapy Discharge to a transitional care facility for continued skilled therapy services.    See \"Rehab Therapy-Acute\" Patient Summary Report for complete documentation.   "

## 2019-12-07 NOTE — PROGRESS NOTES
"   Orthopaedic Progress Note    Interval changes:  Patient doing well  Dressings CDI    ROS - Patient denies any new issues.  Pain well controlled.    /89   Pulse 85   Temp 36.3 °C (97.4 °F) (Temporal)   Resp 17   Ht 1.702 m (5' 7.01\")   Wt 76.2 kg (167 lb 15.9 oz)   SpO2 95%       Patient seen and examined  No acute distress  Breathing non labored  RRR  Left hip dressings are clean, dry, and intact. Patient clearly fires tibialis anterior, EHL, and gastrocnemius/soleus. Sensation is intact to light touch throughout superficial peroneal, deep peroneal, tibial, saphenous, and sural nerve distributions. Strong and palpable 2+ dorsalis pedis and posterior tibial pulses with capillary refill less than 2 seconds. No lower leg tenderness or discomfort.       Recent Labs     12/04/19  0442 12/05/19  0836 12/06/19  0507   WBC 9.0 8.5 8.1   RBC 3.61* 3.35* 3.31*   HEMOGLOBIN 10.8* 10.1* 10.2*   HEMATOCRIT 33.1* 31.1* 30.6*   MCV 91.7 92.8 92.4   MCH 29.9 30.1 30.8   MCHC 32.6* 32.5* 33.3*   RDW 41.8 42.7 43.0   PLATELETCT 249 221 236   MPV 11.0 11.7 11.6       Active Hospital Problems    Diagnosis   • Urinary retention [R33.9]   • Acute blood loss anemia [D62]   • Femur fracture (HCC) [S72.90XA]   • Essential hypertension [I10]   • Prediabetes [R73.03]   • BPH (benign prostatic hyperplasia) [N40.0]   • Marijuana use, continuous [F12.90]       Assessment/Plan:  Cleared for DC by ortho pending medicine clearance  POD#8 S/P Intramedullary nailing of left femur.   Wt bearing status - WBAT  Wound care/Drains - dressings changed every other day by nursing  Future Procedures - none   Lovenox: Start 11/29, Duration-until ambulatory > 150'  Sutures/Staples out- 12-14 days post operatively  PT/OT-initiated  Antibiotics: completed  DVT Prophylaxis- TEDS/SCDs/Foot pumps  Garibay-none  Case Coordination for Discharge Planning - Disposition SNF   "

## 2019-12-07 NOTE — PROGRESS NOTES
"   Orthopaedic Progress Note    Interval changes:  Patient doing well  Dressings changed - incisions without issue    ROS - Patient denies any new issues.  Pain well controlled.    /70   Pulse 81   Temp 36.2 °C (97.1 °F) (Temporal)   Resp 17   Ht 1.702 m (5' 7.01\")   Wt 76.2 kg (167 lb 15.9 oz)   SpO2 90%       Patient seen and examined  No acute distress  Breathing non labored  RRR  Left hip dressings changed, surgical incisions are well approximated and are dry and clean.  There is no erythema, induration, or signs of infection at any of the incision sites. Patient clearly fires tibialis anterior, EHL, and gastrocnemius/soleus. Sensation is intact to light touch throughout superficial peroneal, deep peroneal, tibial, saphenous, and sural nerve distributions. Strong and palpable 2+ dorsalis pedis and posterior tibial pulses with capillary refill less than 2 seconds. No lower leg tenderness or discomfort.       Recent Labs     12/05/19  0836 12/06/19  0507   WBC 8.5 8.1   RBC 3.35* 3.31*   HEMOGLOBIN 10.1* 10.2*   HEMATOCRIT 31.1* 30.6*   MCV 92.8 92.4   MCH 30.1 30.8   MCHC 32.5* 33.3*   RDW 42.7 43.0   PLATELETCT 221 236   MPV 11.7 11.6       Active Hospital Problems    Diagnosis   • Urinary retention [R33.9]   • Acute blood loss anemia [D62]   • Femur fracture (HCC) [S72.90XA]   • Essential hypertension [I10]   • Prediabetes [R73.03]   • BPH (benign prostatic hyperplasia) [N40.0]   • Marijuana use, continuous [F12.90]       Assessment/Plan:  Cleared for DC by ortho pending medicine clearance  POD#9 S/P Intramedullary nailing of left femur.   Wt bearing status - WBAT  Wound care/Drains - dressings changed every other day by nursing  Future Procedures - none   Lovenox: Start 11/29, Duration-until ambulatory > 150'  Sutures/Staples out- 12-14 days post operatively  PT/OT-initiated  Antibiotics: completed  DVT Prophylaxis- TEDS/SCDs/Foot pumps  Garibay-none  Case Coordination for Discharge Planning - " Disposition SNF

## 2019-12-07 NOTE — ASSESSMENT & PLAN NOTE
On shah.       12/14 attempt catheter removal, bladder scan protocol  No signs of UTI      12/15 retention of over 500 mL's this a.m., straight cath  If recurrent, will replace Shah catheter  Encourage urination  Continue Proscar and Flomax  History of retention, follows up with urologist in [place]  May need discharge with catheter in place    12/16 recurrent retention, Shah catheter replaced  Continue Proscar and Flomax

## 2019-12-08 PROCEDURE — A9270 NON-COVERED ITEM OR SERVICE: HCPCS | Performed by: INTERNAL MEDICINE

## 2019-12-08 PROCEDURE — 700102 HCHG RX REV CODE 250 W/ 637 OVERRIDE(OP): Performed by: INTERNAL MEDICINE

## 2019-12-08 PROCEDURE — 700102 HCHG RX REV CODE 250 W/ 637 OVERRIDE(OP): Performed by: HOSPITALIST

## 2019-12-08 PROCEDURE — 700102 HCHG RX REV CODE 250 W/ 637 OVERRIDE(OP): Performed by: PHYSICIAN ASSISTANT

## 2019-12-08 PROCEDURE — 770006 HCHG ROOM/CARE - MED/SURG/GYN SEMI*

## 2019-12-08 PROCEDURE — 99231 SBSQ HOSP IP/OBS SF/LOW 25: CPT | Performed by: INTERNAL MEDICINE

## 2019-12-08 PROCEDURE — 306015 LOCK STAT FOLEY: Performed by: INTERNAL MEDICINE

## 2019-12-08 PROCEDURE — A9270 NON-COVERED ITEM OR SERVICE: HCPCS | Performed by: HOSPITALIST

## 2019-12-08 PROCEDURE — 700111 HCHG RX REV CODE 636 W/ 250 OVERRIDE (IP): Performed by: ORTHOPAEDIC SURGERY

## 2019-12-08 PROCEDURE — 700101 HCHG RX REV CODE 250: Performed by: INTERNAL MEDICINE

## 2019-12-08 PROCEDURE — A9270 NON-COVERED ITEM OR SERVICE: HCPCS | Performed by: PHYSICIAN ASSISTANT

## 2019-12-08 RX ADMIN — ERYTHROMYCIN: 5 OINTMENT OPHTHALMIC at 21:05

## 2019-12-08 RX ADMIN — LIDOCAINE 1 PATCH: 50 PATCH TOPICAL at 15:40

## 2019-12-08 RX ADMIN — CYCLOBENZAPRINE 10 MG: 10 TABLET, FILM COATED ORAL at 18:12

## 2019-12-08 RX ADMIN — ENOXAPARIN SODIUM 40 MG: 100 INJECTION SUBCUTANEOUS at 04:31

## 2019-12-08 RX ADMIN — OXYCODONE HYDROCHLORIDE 5 MG: 5 TABLET ORAL at 00:54

## 2019-12-08 RX ADMIN — CYCLOBENZAPRINE 10 MG: 10 TABLET, FILM COATED ORAL at 11:47

## 2019-12-08 RX ADMIN — ERYTHROMYCIN: 5 OINTMENT OPHTHALMIC at 04:31

## 2019-12-08 RX ADMIN — SENNOSIDES AND DOCUSATE SODIUM 2 TABLET: 8.6; 5 TABLET ORAL at 04:31

## 2019-12-08 RX ADMIN — FINASTERIDE 5 MG: 5 TABLET, FILM COATED ORAL at 04:31

## 2019-12-08 RX ADMIN — SENNOSIDES AND DOCUSATE SODIUM 2 TABLET: 8.6; 5 TABLET ORAL at 18:12

## 2019-12-08 RX ADMIN — TAMSULOSIN HYDROCHLORIDE 0.4 MG: 0.4 CAPSULE ORAL at 09:24

## 2019-12-08 ASSESSMENT — ENCOUNTER SYMPTOMS
ABDOMINAL PAIN: 0
CHILLS: 0
DOUBLE VISION: 0
DIZZINESS: 0
DIARRHEA: 0
HEADACHES: 0
PALPITATIONS: 0
NAUSEA: 0
SHORTNESS OF BREATH: 0
CONSTIPATION: 0
VOMITING: 0
FEVER: 0
COUGH: 0
HEARTBURN: 0
SPUTUM PRODUCTION: 0
BLURRED VISION: 0

## 2019-12-08 NOTE — PROGRESS NOTES
"Pt resting in bed, sleeping intermittently, A&O x 3, calm, cooperative, flat affect. RN reviewed POC which is for pain mgt, work w/ therapy, eventual discharge planning w/ case mgt. RN encouraged CDB w/ \"I.S.\", will reinforce t/o day, no evidence of learning. Fall and safety precautions in place, bed alarm on. Pt is independent w/ turns for skin integrity, RN and CNA have collaborated to ensure pt is repositioned frequently. Pt has SCDs on and Lovenox per MAR for VTE. Hourly rounds ongoing, call light w/in reach.   " rolling walker

## 2019-12-08 NOTE — PROGRESS NOTES
Hospital Medicine Daily Progress Note    Date of Service  12/8/2019    Chief Complaint  79 y.o. male admitted 11/27/2019 with ground level fall, hip pain     Hospital Course    79-year-old male past medical history of hypertension, diabetes presenting with ground-level fall, left hip pain.  He was getting out of his car when he slipped on ice and landed on his left hip.  He was unable to bear weight on his left side with significant amount of pain.  Please refer to H&P with Dr. Pacheco for further details. Xr showed Mild/moderately displaced subtrochanteric proximal left femur fracture. He was taken to OR 11/28, by Dr. Barboza and he did have Intramedullary nailing of left femur. He was being more confused 12/2. Narcotic were held. Vitals stable. Lab work unremarkable. 12/4 noted LADARIUS. IF fluid started. Mentation improved.         Interval Problem Update  Spoke with his son. His son tells me that this is baseline. His father has been in [facility] for long time. He smokes marijuana a lot. He is not cooperative most of the time. Family is trying to get him close to [place]. No neuro deficits. Electrolytes wnl. No signs of infection    12/4- mentation improved. Pt doing better. IV fluid due to LADARIUS,. Hoping transferring to SNF to [place]. Family willing to provide transportation. No urinary retention. Pt tells me that he is feeling better, eating better. Right eye conjunctivitis. Erythromycin for 2 days    12/5- some urinary retention. Pt was not taking flomax 2 weeks before admission. Worse last night from IVF. GFR normal. Pt medical cleared for transfer.   12/6- feeling better, after shah was placed. No complains. Pending placement.   12/7- feeling better after shah placed. He has no concerns. No event overnight. Pending placement   12/8- did not sleep last night, because roommate was screaming. No other complains. Asking to be transfer to [place]. He tells me that his son in [place], will help him. Pt has his own place. He  tells me that his daughter will help him. Still pending transfer. Medical stable.     Consultants/Specialty  Ortho     Code Status  Full code     Disposition  Inpatient     Review of Systems  Review of Systems   Constitutional: Negative for chills, fever and malaise/fatigue.   Eyes: Negative for blurred vision and double vision.   Respiratory: Negative for cough, sputum production and shortness of breath.    Cardiovascular: Negative for chest pain, palpitations and leg swelling.   Gastrointestinal: Negative for abdominal pain, constipation, diarrhea, heartburn, nausea and vomiting.   Genitourinary: Negative for dysuria, frequency, hematuria and urgency.        Trouble voiding    Musculoskeletal: Positive for joint pain.   Neurological: Negative for dizziness and headaches.        Physical Exam  Temp:  [36.2 °C (97.1 °F)-36.9 °C (98.4 °F)] 36.3 °C (97.3 °F)  Pulse:  [84-96] 91  Resp:  [16-19] 16  BP: (124-140)/(80-87) 124/83  SpO2:  [90 %-93 %] 90 %    Physical Exam  Vitals signs and nursing note reviewed.   Constitutional:       General: He is not in acute distress.     Appearance: Normal appearance. He is not ill-appearing or toxic-appearing.   HENT:      Head: Normocephalic and atraumatic.      Right Ear: External ear normal.      Left Ear: External ear normal.   Neck:      Musculoskeletal: Normal range of motion. No neck rigidity.   Cardiovascular:      Rate and Rhythm: Normal rate.      Heart sounds: No murmur.   Pulmonary:      Effort: Pulmonary effort is normal. No respiratory distress.      Breath sounds: No wheezing or rales.   Abdominal:      General: Bowel sounds are normal. There is no distension.      Palpations: Abdomen is soft.      Tenderness: There is no guarding.   Musculoskeletal:      Comments: Left leg slight swelling and tenderness    Neurological:      General: No focal deficit present.      Mental Status: He is alert and oriented to person, place, and time.      Cranial Nerves: No cranial  nerve deficit.   Psychiatric:         Mood and Affect: Mood normal.         Behavior: Behavior normal.         Thought Content: Thought content normal.         Judgment: Judgment normal.      Comments: Cooperative and pleasant          Fluids    Intake/Output Summary (Last 24 hours) at 12/8/2019 1218  Last data filed at 12/8/2019 1000  Gross per 24 hour   Intake 440 ml   Output 1400 ml   Net -960 ml       Laboratory  Recent Labs     12/06/19  0507   WBC 8.1   RBC 3.31*   HEMOGLOBIN 10.2*   HEMATOCRIT 30.6*   MCV 92.4   MCH 30.8   MCHC 33.3*   RDW 43.0   PLATELETCT 236   MPV 11.6     Recent Labs     12/06/19  0507   SODIUM 134*   POTASSIUM 4.3   CHLORIDE 105   CO2 24   GLUCOSE 108*   BUN 21   CREATININE 0.95   CALCIUM 8.2*                   Imaging  DX-FEMUR-2+ LEFT   Final Result      Intraoperative evaluation of femur fracture fixation.      DX-PORTABLE FLUORO > 1 HOUR   Final Result      Intraoperative evaluation of femur fracture fixation.      DX-CHEST-PORTABLE (1 VIEW)   Final Result      No acute cardiopulmonary abnormality. No displaced rib fractures. No pneumothorax.      DX-HIP-COMPLETE - UNILATERAL 2+ LEFT   Final Result      1.  Mild/moderately displaced subtrochanteric proximal left femur fracture.   2.  Degenerative changes of the hips.      DX-FEMUR-1 VIEW LEFT   Final Result      Acute, obliquely oriented fracture of subtrochanteric femoral diaphysis.           Assessment/Plan  * Femur fracture (HCC)  Assessment & Plan  Left sided subtrochanteric femur fracture   Cont with pain control   Pt/ot eval as appropriate  SNF to [place]   Family willing to provide transportation   Ok to provide pain medications if pt is in significant pain   12/5- primary care in [place] will try to arrange SNF . Continue PT/OT whenever possible while inpatient       Urinary retention  Assessment & Plan  On shah.   Will possible attempt to remove shah in couple of days if pt still here, otherwise, he will be dc with shah  if snf is available  No signs of UTI      Acute blood loss anemia- (present on admission)  Assessment & Plan  Hemoglobin 10.1-11  Baseline hemoglobin 14  Related to surgery  Follow CBC closely  Transfuse as needed with target hemoglobin above 7  12/5-remain stable. Slight improved   12/6- remain stable     Marijuana use, continuous- (present on admission)  Assessment & Plan  Daily use     BPH (benign prostatic hyperplasia)  Assessment & Plan  Continue tamsulosin and finasteride   Continue shah   He will need to follow up with urology in [place]     Prediabetes  Assessment & Plan  Pt has no diabetes. He has prediabetes. a1c 5.7   He is 78 y/o. no need for metformin which has more side effects instead of benefits   Discontinue accu-check  Diet controled       Essential hypertension  Assessment & Plan  Unknown home medications at this time \  BP stable  Prn hydralazine ordered   Doing well without med.   Continue to monitor        VTE prophylaxis: lovenox

## 2019-12-08 NOTE — PROGRESS NOTES
"   Orthopaedic Progress Note    Interval changes:  Patient doing well  Dressings CDI    ROS - Patient denies any new issues.  Pain well controlled.    /83   Pulse 91   Temp 36.3 °C (97.3 °F) (Temporal)   Resp 16   Ht 1.702 m (5' 7.01\")   Wt 76.2 kg (167 lb 15.9 oz)   SpO2 90%       Patient seen and examined  No acute distress  Breathing non labored  RRR  Left hip dressings CDI. Patient clearly fires tibialis anterior, EHL, and gastrocnemius/soleus. Sensation is intact to light touch throughout superficial peroneal, deep peroneal, tibial, saphenous, and sural nerve distributions. Strong and palpable 2+ dorsalis pedis and posterior tibial pulses with capillary refill less than 2 seconds. No lower leg tenderness or discomfort.       Recent Labs     12/06/19  0507   WBC 8.1   RBC 3.31*   HEMOGLOBIN 10.2*   HEMATOCRIT 30.6*   MCV 92.4   MCH 30.8   MCHC 33.3*   RDW 43.0   PLATELETCT 236   MPV 11.6       Active Hospital Problems    Diagnosis   • Urinary retention [R33.9]   • Acute blood loss anemia [D62]   • Femur fracture (HCC) [S72.90XA]   • Essential hypertension [I10]   • Prediabetes [R73.03]   • BPH (benign prostatic hyperplasia) [N40.0]   • Marijuana use, continuous [F12.90]       Assessment/Plan:  Cleared for DC by ortho pending medicine clearance  POD#10 S/P Intramedullary nailing of left femur.   Wt bearing status - WBAT  Wound care/Drains - dressings changed every other day by nursing  Future Procedures - none   Lovenox: Start 11/29, Duration-until ambulatory > 150'  Sutures/Staples out- 12-14 days post operatively  PT/OT-initiated  Antibiotics: completed  DVT Prophylaxis- TEDS/SCDs/Foot pumps  Garibay-none  Case Coordination for Discharge Planning - Disposition SNF   "

## 2019-12-09 PROCEDURE — 700102 HCHG RX REV CODE 250 W/ 637 OVERRIDE(OP): Performed by: PHYSICIAN ASSISTANT

## 2019-12-09 PROCEDURE — 700102 HCHG RX REV CODE 250 W/ 637 OVERRIDE(OP): Performed by: INTERNAL MEDICINE

## 2019-12-09 PROCEDURE — A9270 NON-COVERED ITEM OR SERVICE: HCPCS | Performed by: INTERNAL MEDICINE

## 2019-12-09 PROCEDURE — 700101 HCHG RX REV CODE 250: Performed by: INTERNAL MEDICINE

## 2019-12-09 PROCEDURE — 770006 HCHG ROOM/CARE - MED/SURG/GYN SEMI*

## 2019-12-09 PROCEDURE — 700102 HCHG RX REV CODE 250 W/ 637 OVERRIDE(OP): Performed by: HOSPITALIST

## 2019-12-09 PROCEDURE — 306015 LOCK STAT FOLEY: Performed by: INTERNAL MEDICINE

## 2019-12-09 PROCEDURE — A9270 NON-COVERED ITEM OR SERVICE: HCPCS | Performed by: PHYSICIAN ASSISTANT

## 2019-12-09 PROCEDURE — A9270 NON-COVERED ITEM OR SERVICE: HCPCS | Performed by: HOSPITALIST

## 2019-12-09 PROCEDURE — 99231 SBSQ HOSP IP/OBS SF/LOW 25: CPT | Performed by: INTERNAL MEDICINE

## 2019-12-09 PROCEDURE — 700111 HCHG RX REV CODE 636 W/ 250 OVERRIDE (IP): Performed by: ORTHOPAEDIC SURGERY

## 2019-12-09 RX ORDER — ZOLPIDEM TARTRATE 5 MG/1
5 TABLET ORAL NIGHTLY PRN
Status: DISCONTINUED | OUTPATIENT
Start: 2019-12-09 | End: 2019-12-17

## 2019-12-09 RX ORDER — NICOTINE 21 MG/24HR
21 PATCH, TRANSDERMAL 24 HOURS TRANSDERMAL
Status: DISCONTINUED | OUTPATIENT
Start: 2019-12-10 | End: 2019-12-09

## 2019-12-09 RX ORDER — NICOTINE 21 MG/24HR
21 PATCH, TRANSDERMAL 24 HOURS TRANSDERMAL
Status: DISCONTINUED | OUTPATIENT
Start: 2019-12-09 | End: 2019-12-20 | Stop reason: HOSPADM

## 2019-12-09 RX ADMIN — CYCLOBENZAPRINE 10 MG: 10 TABLET, FILM COATED ORAL at 18:27

## 2019-12-09 RX ADMIN — ENOXAPARIN SODIUM 40 MG: 100 INJECTION SUBCUTANEOUS at 04:55

## 2019-12-09 RX ADMIN — LIDOCAINE 1 PATCH: 50 PATCH TOPICAL at 18:17

## 2019-12-09 RX ADMIN — CYCLOBENZAPRINE 10 MG: 10 TABLET, FILM COATED ORAL at 04:55

## 2019-12-09 RX ADMIN — NICOTINE TRANSDERMAL SYSTEM 21 MG: 21 PATCH, EXTENDED RELEASE TRANSDERMAL at 18:23

## 2019-12-09 RX ADMIN — FINASTERIDE 5 MG: 5 TABLET, FILM COATED ORAL at 04:55

## 2019-12-09 RX ADMIN — TAMSULOSIN HYDROCHLORIDE 0.4 MG: 0.4 CAPSULE ORAL at 09:45

## 2019-12-09 RX ADMIN — ZOLPIDEM TARTRATE 5 MG: 5 TABLET ORAL at 23:04

## 2019-12-09 RX ADMIN — SENNOSIDES AND DOCUSATE SODIUM 2 TABLET: 8.6; 5 TABLET ORAL at 04:55

## 2019-12-09 RX ADMIN — ERYTHROMYCIN: 5 OINTMENT OPHTHALMIC at 04:55

## 2019-12-09 ASSESSMENT — ENCOUNTER SYMPTOMS
HEARTBURN: 0
FEVER: 0
NAUSEA: 0
DOUBLE VISION: 0
ABDOMINAL PAIN: 0
SPUTUM PRODUCTION: 0
SHORTNESS OF BREATH: 0
DIZZINESS: 0
HEADACHES: 0
DIARRHEA: 0
CHILLS: 0
COUGH: 0
VOMITING: 0
CONSTIPATION: 0
PALPITATIONS: 0
BLURRED VISION: 0

## 2019-12-09 NOTE — PROGRESS NOTES
"Tried to administer Erythromycin and encourage ambulation, pt all of sudden talking loud with the nurse stating \" I haven't harmed anyone. I need to smoke. You guys are not letting me smoke and it is against my [employer]\". This RN tried to calm the patient down by offering taking to the doctor and asking MD about nicotine patch. Unsuccessful, pt started hitting the call light on the bed and started getting very angry. This RN gave pt some time and got out of the room. Pt now sleeping in bed.   "

## 2019-12-09 NOTE — PROGRESS NOTES
RN has repeatedly attempted to have pt up to chair during day, and / or to ambulate hallway, however pt will not agree despite best efforts by RN w/ EDU regarding mobility. Pt has been up to bathroom w/ FWW / SBA but chooses to return to bed. He has been repositioning while in bed for skin integrity, but is simply more comfortable in bed and declines to be up in a chair at this time.

## 2019-12-09 NOTE — PROGRESS NOTES
"   Orthopaedic Progress Note    Interval changes:  Patient doing well  Dressings CDI    ROS - Patient denies any new issues.  Pain well controlled.    /85   Pulse 81   Temp 36.7 °C (98.1 °F) (Temporal)   Resp 20   Ht 1.702 m (5' 7.01\")   Wt 76.2 kg (167 lb 15.9 oz)   SpO2 91%       Patient seen and examined  No acute distress  Breathing non labored  RRR  Left hip dressings CDI. Patient clearly fires tibialis anterior, EHL, and gastrocnemius/soleus. Sensation is intact to light touch throughout superficial peroneal, deep peroneal, tibial, saphenous, and sural nerve distributions. Strong and palpable 2+ dorsalis pedis and posterior tibial pulses with capillary refill less than 2 seconds. No lower leg tenderness or discomfort.        Active Hospital Problems    Diagnosis   • Urinary retention [R33.9]   • Acute blood loss anemia [D62]   • Femur fracture (HCC) [S72.90XA]   • Essential hypertension [I10]   • Prediabetes [R73.03]   • BPH (benign prostatic hyperplasia) [N40.0]   • Marijuana use, continuous [F12.90]       Assessment/Plan:  Cleared for DC by ortho pending medicine clearance  POD#11 S/P Intramedullary nailing of left femur.   Wt bearing status - WBAT  Wound care/Drains - dressings changed every other day by nursing  Future Procedures - none   Lovenox: Start 11/29, Duration-until ambulatory > 150'  Sutures/Staples out- 12-14 days post operatively  PT/OT-initiated  Antibiotics: completed  DVT Prophylaxis- TEDS/SCDs/Foot pumps  Garibay-none  Case Coordination for Discharge Planning - Disposition SNF   "

## 2019-12-09 NOTE — DISCHARGE PLANNING
Agency/Facility Name: [facility]  Spoke To: Kaylyn  Outcome: Patient declined due to noncontracted Medicare HMO

## 2019-12-09 NOTE — CARE PLAN
Problem: Safety  Goal: Will remain free from falls  Outcome: PROGRESSING AS EXPECTED  Intervention: Implement fall precautions  Flowsheets (Taken 12/9/2019 0815)  Environmental Precautions: Treaded Slipper Socks on Patient;Personal Belongings, Wastebasket, Call Bell etc. in Easy Reach;Transferred to Stronger Side;Report Given to Other Health Care Providers Regarding Fall Risk;Communication Sign for Patients & Families;Mobility Assessed & Appropriate Sign Placed;Bed in Low Position  Note:   Safety precautions in place. Call light within reach. Bed is low and in locked position. Hourly rounding in place      Problem: Discharge Barriers/Planning  Goal: Patient's continuum of care needs will be met  Outcome: PROGRESSING AS EXPECTED  Intervention: Assess potential discharge barriers on admission and throughout hospital stay  Note:   Pending SNF in [place].   SW following

## 2019-12-09 NOTE — PROGRESS NOTES
DATE OF SERVICE:    TIME:  Approximately 12:20 p.m.    HISTORY:  The patient is postop day #11 from intramedullary nailing of left   hip.  He is sleeping.  I did not wake him up.  His dressings are dry.    PHYSICAL EXAMINATION:  VITAL SIGNS:  His blood pressure most recently is 128/85, heart rate 81,   respirations 20, temperature 98.1.    ASSESSMENT:  Left subtrochanteric femur fracture -- status post intramedullary   nailing.    PLAN:  Weightbearing as tolerated.  Physical and occupational therapy.  DVT   prophylaxis.  Discharge planning.  We will check radiographs of the left femur   tomorrow and likely remove his staples in the next couple of days.       ____________________________________     MD POPPY KELLY / FEDERICO    DD:  12/09/2019 13:16:19  DT:  12/09/2019 13:22:02    D#:  [id number]  Job#:  [id number]

## 2019-12-09 NOTE — CARE PLAN
Problem: Nutritional:  Goal: Achieve adequate nutritional intake  Description  Patient will consume ~50% of meals   Outcome: MET  Per chart pt PO %. Pt is eating well and PO improved. RD available prn

## 2019-12-09 NOTE — CARE PLAN
Problem: Safety  Goal: Will remain free from injury  Outcome: PROGRESSING AS EXPECTED  Goal: Will remain free from falls  Outcome: PROGRESSING AS EXPECTED     Problem: Psychosocial Needs:  Goal: Level of anxiety will decrease  Outcome: PROGRESSING AS EXPECTED

## 2019-12-09 NOTE — DISCHARGE PLANNING
Agency/Facility Name:  [facility]   P: [phone]  Spoke To: Kaylyn  Outcome: Attempted to follow up, however no answer. Voicemail was left.     [facility]  P: [phone]  Spoke To: Admissions  Outcome: Attempted to follow up, however no answer. Voicemail was left.     Agency/Facility Name:  [facility]   P:  [phone]  Spoke To:    Outcome: Attempted to follow up, however no answer. Voicemail was left.     Ag[facility]Facility [facility]   P: [phone]  Spoke To: Ck   Outcome: Fax number provided to send referral.   Fax: [phone]

## 2019-12-10 ENCOUNTER — APPOINTMENT (OUTPATIENT)
Dept: RADIOLOGY | Facility: MEDICAL CENTER | Age: 79
DRG: 480 | End: 2019-12-10
Attending: ORTHOPAEDIC SURGERY
Payer: MEDICARE

## 2019-12-10 LAB
ALBUMIN SERPL BCP-MCNC: 3.1 G/DL (ref 3.2–4.9)
ALBUMIN/GLOB SERPL: 1.1 G/DL
ALP SERPL-CCNC: 65 U/L (ref 30–99)
ALT SERPL-CCNC: 17 U/L (ref 2–50)
ANION GAP SERPL CALC-SCNC: 7 MMOL/L (ref 0–11.9)
AST SERPL-CCNC: 15 U/L (ref 12–45)
BASOPHILS # BLD AUTO: 0.7 % (ref 0–1.8)
BASOPHILS # BLD: 0.05 K/UL (ref 0–0.12)
BILIRUB SERPL-MCNC: 1.1 MG/DL (ref 0.1–1.5)
BUN SERPL-MCNC: 19 MG/DL (ref 8–22)
CALCIUM SERPL-MCNC: 8.7 MG/DL (ref 8.5–10.5)
CHLORIDE SERPL-SCNC: 104 MMOL/L (ref 96–112)
CO2 SERPL-SCNC: 25 MMOL/L (ref 20–33)
CREAT SERPL-MCNC: 1 MG/DL (ref 0.5–1.4)
EOSINOPHIL # BLD AUTO: 0.2 K/UL (ref 0–0.51)
EOSINOPHIL NFR BLD: 2.7 % (ref 0–6.9)
ERYTHROCYTE [DISTWIDTH] IN BLOOD BY AUTOMATED COUNT: 45.1 FL (ref 35.9–50)
GLOBULIN SER CALC-MCNC: 2.8 G/DL (ref 1.9–3.5)
GLUCOSE SERPL-MCNC: 106 MG/DL (ref 65–99)
HCT VFR BLD AUTO: 36.6 % (ref 42–52)
HGB BLD-MCNC: 11.8 G/DL (ref 14–18)
IMM GRANULOCYTES # BLD AUTO: 0.04 K/UL (ref 0–0.11)
IMM GRANULOCYTES NFR BLD AUTO: 0.5 % (ref 0–0.9)
LYMPHOCYTES # BLD AUTO: 1.33 K/UL (ref 1–4.8)
LYMPHOCYTES NFR BLD: 18.2 % (ref 22–41)
MCH RBC QN AUTO: 30.3 PG (ref 27–33)
MCHC RBC AUTO-ENTMCNC: 32.2 G/DL (ref 33.7–35.3)
MCV RBC AUTO: 94.1 FL (ref 81.4–97.8)
MONOCYTES # BLD AUTO: 0.77 K/UL (ref 0–0.85)
MONOCYTES NFR BLD AUTO: 10.5 % (ref 0–13.4)
NEUTROPHILS # BLD AUTO: 4.91 K/UL (ref 1.82–7.42)
NEUTROPHILS NFR BLD: 67.4 % (ref 44–72)
NRBC # BLD AUTO: 0 K/UL
NRBC BLD-RTO: 0 /100 WBC
PLATELET # BLD AUTO: 349 K/UL (ref 164–446)
PMV BLD AUTO: 11.7 FL (ref 9–12.9)
POTASSIUM SERPL-SCNC: 4.2 MMOL/L (ref 3.6–5.5)
PROT SERPL-MCNC: 5.9 G/DL (ref 6–8.2)
RBC # BLD AUTO: 3.89 M/UL (ref 4.7–6.1)
SODIUM SERPL-SCNC: 136 MMOL/L (ref 135–145)
WBC # BLD AUTO: 7.3 K/UL (ref 4.8–10.8)

## 2019-12-10 PROCEDURE — A9270 NON-COVERED ITEM OR SERVICE: HCPCS | Performed by: INTERNAL MEDICINE

## 2019-12-10 PROCEDURE — 700102 HCHG RX REV CODE 250 W/ 637 OVERRIDE(OP): Performed by: HOSPITALIST

## 2019-12-10 PROCEDURE — 700101 HCHG RX REV CODE 250: Performed by: INTERNAL MEDICINE

## 2019-12-10 PROCEDURE — 73552 X-RAY EXAM OF FEMUR 2/>: CPT | Mod: LT

## 2019-12-10 PROCEDURE — 36415 COLL VENOUS BLD VENIPUNCTURE: CPT

## 2019-12-10 PROCEDURE — 80053 COMPREHEN METABOLIC PANEL: CPT

## 2019-12-10 PROCEDURE — A9270 NON-COVERED ITEM OR SERVICE: HCPCS | Performed by: HOSPITALIST

## 2019-12-10 PROCEDURE — 99232 SBSQ HOSP IP/OBS MODERATE 35: CPT | Performed by: INTERNAL MEDICINE

## 2019-12-10 PROCEDURE — 700111 HCHG RX REV CODE 636 W/ 250 OVERRIDE (IP): Performed by: ORTHOPAEDIC SURGERY

## 2019-12-10 PROCEDURE — 85025 COMPLETE CBC W/AUTO DIFF WBC: CPT

## 2019-12-10 PROCEDURE — 700102 HCHG RX REV CODE 250 W/ 637 OVERRIDE(OP): Performed by: PHYSICIAN ASSISTANT

## 2019-12-10 PROCEDURE — A9270 NON-COVERED ITEM OR SERVICE: HCPCS | Performed by: PHYSICIAN ASSISTANT

## 2019-12-10 PROCEDURE — 770006 HCHG ROOM/CARE - MED/SURG/GYN SEMI*

## 2019-12-10 PROCEDURE — 700102 HCHG RX REV CODE 250 W/ 637 OVERRIDE(OP): Performed by: INTERNAL MEDICINE

## 2019-12-10 RX ADMIN — CYCLOBENZAPRINE 10 MG: 10 TABLET, FILM COATED ORAL at 11:07

## 2019-12-10 RX ADMIN — SENNOSIDES AND DOCUSATE SODIUM 2 TABLET: 8.6; 5 TABLET ORAL at 06:46

## 2019-12-10 RX ADMIN — ZOLPIDEM TARTRATE 5 MG: 5 TABLET ORAL at 19:12

## 2019-12-10 RX ADMIN — CYCLOBENZAPRINE 10 MG: 10 TABLET, FILM COATED ORAL at 02:21

## 2019-12-10 RX ADMIN — ENOXAPARIN SODIUM 40 MG: 100 INJECTION SUBCUTANEOUS at 06:46

## 2019-12-10 RX ADMIN — CYCLOBENZAPRINE 10 MG: 10 TABLET, FILM COATED ORAL at 20:32

## 2019-12-10 RX ADMIN — LIDOCAINE 1 PATCH: 50 PATCH TOPICAL at 17:17

## 2019-12-10 RX ADMIN — FINASTERIDE 5 MG: 5 TABLET, FILM COATED ORAL at 06:46

## 2019-12-10 RX ADMIN — ERYTHROMYCIN: 5 OINTMENT OPHTHALMIC at 13:54

## 2019-12-10 RX ADMIN — NICOTINE POLACRILEX 2 MG: 2 GUM, CHEWING BUCCAL at 11:07

## 2019-12-10 ASSESSMENT — ENCOUNTER SYMPTOMS
VOMITING: 0
FEVER: 0
SPUTUM PRODUCTION: 0
DIZZINESS: 0
MEMORY LOSS: 1
CONSTIPATION: 0
NAUSEA: 0
DIAPHORESIS: 0
HEADACHES: 0
PALPITATIONS: 0
COUGH: 0
DEPRESSION: 0
HEARTBURN: 0
MYALGIAS: 1
ABDOMINAL PAIN: 0
CHILLS: 0

## 2019-12-10 NOTE — PROGRESS NOTES
"Patient refusing morning medications. Stating \"Get out of of here, I'm not a guinea pig\". Pt is angry at this time, will reassess patient.  "

## 2019-12-10 NOTE — CARE PLAN
Bed alarm is in use.  Patient is compliant with use of call light and waits for staff assistance. Frequent rounding.

## 2019-12-10 NOTE — DISCHARGE PLANNING
Agency/Facility Name: [facility]  Spoke To: Lucas  Outcome: Requested updated referral be sent to facility at Fax: [phone]    Phone: [phone]

## 2019-12-10 NOTE — DISCHARGE PLANNING
Agency/Facility Name:  [facility]  P: [phone]  Spoke To: Admissions  Outcome: Attempted to follow up, however no answer. Voicemail was left.      Agency/Facility Name:  [facility]   P:  [phone]  Spoke To: Bea   Outcome: Referral received, under review.     Agency/Facility Name:  [facility]   P: [phone]  Spoke To: Admissions   Outcome: Attempted to follow up, however no answer. Voicemail was left.

## 2019-12-10 NOTE — THERAPY
"Pt wanting a nicotine patch before OOB, pt shown it was on his Lft UE. Pt then stated he wanted pain meds. When asked if he would participate w/PT after his pain meds, pt yelling \"I'm not getting OOB.\"  Pt has been difficult to mobilize 2* confusion and compliance. Pt is not functioning @ a level to allow for d/c home w/HH services. Pt is a fall risk @ this time. PT conts to recommend post acute therapy before returning to a home setting.   "

## 2019-12-10 NOTE — PROGRESS NOTES
Hospital Medicine Daily Progress Note    Date of Service  12/9/2019    Chief Complaint  79 y.o. male admitted 11/27/2019 with ground level fall, hip pain     Hospital Course    79-year-old male past medical history of hypertension, diabetes presenting with ground-level fall, left hip pain.  He was getting out of his car when he slipped on ice and landed on his left hip.  He was unable to bear weight on his left side with significant amount of pain.  Please refer to H&P with Dr. Pacheco for further details. Xr showed Mild/moderately displaced subtrochanteric proximal left femur fracture. He was taken to OR 11/28, by Dr. Barboza and he did have Intramedullary nailing of left femur. He was being more confused 12/2. Narcotic were held. Vitals stable. Lab work unremarkable. 12/4 noted LADARIUS. IF fluid started. Mentation improved.         Interval Problem Update  Spoke with his son. His son tells me that this is baseline. His father has been in [facility] for long time. He smokes marijuana a lot. He is not cooperative most of the time. Family is trying to get him close to [place]. No neuro deficits. Electrolytes wnl. No signs of infection    12/4- mentation improved. Pt doing better. IV fluid due to LADARIUS,. Hoping transferring to SNF to [place]. Family willing to provide transportation. No urinary retention. Pt tells me that he is feeling better, eating better. Right eye conjunctivitis. Erythromycin for 2 days    12/5- some urinary retention. Pt was not taking flomax 2 weeks before admission. Worse last night from IVF. GFR normal. Pt medical cleared for transfer.   12/6- feeling better, after shah was placed. No complains. Pending placement.   12/7- feeling better after shah placed. He has no concerns. No event overnight. Pending placement   12/8- did not sleep last night, because roommate was screaming. No other complains. Asking to be transfer to [place]. He tells me that his son in [place], will help him. Pt has his own place. He  tells me that his daughter will help him. Still pending transfer. Medical stable.   12/9- no event. He would like to go home. I did reassure that we are working on transferring back to [place]. I did discuss with  to see if home health in [place] is an option discharge. He is medical stable. XR tomorrow per ortho.     Consultants/Specialty  Ortho     Code Status  Full code     Disposition  Inpatient     Review of Systems  Review of Systems   Constitutional: Negative for chills, fever and malaise/fatigue.   Eyes: Negative for blurred vision and double vision.   Respiratory: Negative for cough, sputum production and shortness of breath.    Cardiovascular: Negative for chest pain, palpitations and leg swelling.   Gastrointestinal: Negative for abdominal pain, constipation, diarrhea, heartburn, nausea and vomiting.   Genitourinary: Negative for dysuria, frequency, hematuria and urgency.        Trouble voiding    Musculoskeletal: Positive for joint pain.   Neurological: Negative for dizziness and headaches.        Physical Exam  Temp:  [36.5 °C (97.7 °F)-36.8 °C (98.3 °F)] 36.7 °C (98.1 °F)  Pulse:  [81-89] 81  Resp:  [17-20] 20  BP: (117-128)/(78-85) 128/85  SpO2:  [91 %-98 %] 91 %    Physical Exam  Vitals signs and nursing note reviewed.   Constitutional:       General: He is not in acute distress.     Appearance: Normal appearance. He is not ill-appearing or toxic-appearing.   HENT:      Head: Normocephalic and atraumatic.      Right Ear: External ear normal.      Left Ear: External ear normal.   Neck:      Musculoskeletal: Normal range of motion. No neck rigidity.   Cardiovascular:      Rate and Rhythm: Normal rate.      Heart sounds: No murmur.   Pulmonary:      Effort: Pulmonary effort is normal. No respiratory distress.      Breath sounds: No wheezing or rales.   Abdominal:      General: Bowel sounds are normal. There is no distension.      Palpations: Abdomen is soft.      Tenderness: There is no  guarding.   Musculoskeletal:      Comments: Left leg slight swelling and tenderness    Neurological:      General: No focal deficit present.      Mental Status: He is alert and oriented to person, place, and time.      Cranial Nerves: No cranial nerve deficit.   Psychiatric:         Mood and Affect: Mood normal.         Behavior: Behavior normal.         Thought Content: Thought content normal.         Judgment: Judgment normal.      Comments: Cooperative with me         Fluids    Intake/Output Summary (Last 24 hours) at 12/9/2019 1653  Last data filed at 12/9/2019 1100  Gross per 24 hour   Intake 640 ml   Output 1450 ml   Net -810 ml       Laboratory                        Imaging  DX-FEMUR-2+ LEFT   Final Result      Intraoperative evaluation of femur fracture fixation.      DX-PORTABLE FLUORO > 1 HOUR   Final Result      Intraoperative evaluation of femur fracture fixation.      DX-CHEST-PORTABLE (1 VIEW)   Final Result      No acute cardiopulmonary abnormality. No displaced rib fractures. No pneumothorax.      DX-HIP-COMPLETE - UNILATERAL 2+ LEFT   Final Result      1.  Mild/moderately displaced subtrochanteric proximal left femur fracture.   2.  Degenerative changes of the hips.      DX-FEMUR-1 VIEW LEFT   Final Result      Acute, obliquely oriented fracture of subtrochanteric femoral diaphysis.           Assessment/Plan  * Femur fracture (HCC)  Assessment & Plan  Left sided subtrochanteric femur fracture   Cont with pain control   Pt/ot eval as appropriate  SNF to [place]   Family willing to provide transportation   Ok to provide pain medications if pt is in significant pain   12/5- primary care in [place] will try to arrange SNF . Continue PT/OT whenever possible while inpatient       Urinary retention  Assessment & Plan  On shah.   Will possible attempt to remove shah in couple of days if pt still here, otherwise, he will be dc with shah if snf is available  No signs of UTI      Acute blood loss anemia-  (present on admission)  Assessment & Plan  Hemoglobin 10.1-11  Baseline hemoglobin 14  Related to surgery  Follow CBC closely  Transfuse as needed with target hemoglobin above 7  12/5-remain stable. Slight improved   12/6- remain stable     Marijuana use, continuous- (present on admission)  Assessment & Plan  Daily use     BPH (benign prostatic hyperplasia)  Assessment & Plan  Continue tamsulosin and finasteride   Continue shah   He will need to follow up with urology in [place]     Prediabetes  Assessment & Plan  Pt has no diabetes. He has prediabetes. a1c 5.7   He is 78 y/o. no need for metformin which has more side effects instead of benefits   Discontinue accu-check  Diet controled       Essential hypertension  Assessment & Plan  Unknown home medications at this time \  BP stable  Prn hydralazine ordered   Doing well without med.   Continue to monitor        VTE prophylaxis: lovenox

## 2019-12-11 PROCEDURE — 700101 HCHG RX REV CODE 250: Performed by: INTERNAL MEDICINE

## 2019-12-11 PROCEDURE — A9270 NON-COVERED ITEM OR SERVICE: HCPCS | Performed by: PHYSICIAN ASSISTANT

## 2019-12-11 PROCEDURE — 99233 SBSQ HOSP IP/OBS HIGH 50: CPT | Performed by: INTERNAL MEDICINE

## 2019-12-11 PROCEDURE — 97530 THERAPEUTIC ACTIVITIES: CPT

## 2019-12-11 PROCEDURE — 97535 SELF CARE MNGMENT TRAINING: CPT

## 2019-12-11 PROCEDURE — 770006 HCHG ROOM/CARE - MED/SURG/GYN SEMI*

## 2019-12-11 PROCEDURE — 700102 HCHG RX REV CODE 250 W/ 637 OVERRIDE(OP): Performed by: INTERNAL MEDICINE

## 2019-12-11 PROCEDURE — 700102 HCHG RX REV CODE 250 W/ 637 OVERRIDE(OP): Performed by: HOSPITALIST

## 2019-12-11 PROCEDURE — A9270 NON-COVERED ITEM OR SERVICE: HCPCS | Performed by: HOSPITALIST

## 2019-12-11 PROCEDURE — A9270 NON-COVERED ITEM OR SERVICE: HCPCS | Performed by: INTERNAL MEDICINE

## 2019-12-11 PROCEDURE — 700102 HCHG RX REV CODE 250 W/ 637 OVERRIDE(OP): Performed by: PHYSICIAN ASSISTANT

## 2019-12-11 RX ADMIN — NICOTINE TRANSDERMAL SYSTEM 21 MG: 21 PATCH, EXTENDED RELEASE TRANSDERMAL at 04:36

## 2019-12-11 RX ADMIN — CYCLOBENZAPRINE 10 MG: 10 TABLET, FILM COATED ORAL at 13:47

## 2019-12-11 RX ADMIN — ERYTHROMYCIN: 5 OINTMENT OPHTHALMIC at 21:18

## 2019-12-11 RX ADMIN — FINASTERIDE 5 MG: 5 TABLET, FILM COATED ORAL at 04:36

## 2019-12-11 RX ADMIN — ERYTHROMYCIN: 5 OINTMENT OPHTHALMIC at 13:49

## 2019-12-11 RX ADMIN — LIDOCAINE 1 PATCH: 50 PATCH TOPICAL at 16:48

## 2019-12-11 RX ADMIN — OXYCODONE HYDROCHLORIDE 5 MG: 5 TABLET ORAL at 21:48

## 2019-12-11 RX ADMIN — SENNOSIDES AND DOCUSATE SODIUM 2 TABLET: 8.6; 5 TABLET ORAL at 04:36

## 2019-12-11 RX ADMIN — TAMSULOSIN HYDROCHLORIDE 0.4 MG: 0.4 CAPSULE ORAL at 09:41

## 2019-12-11 ASSESSMENT — ENCOUNTER SYMPTOMS
NERVOUS/ANXIOUS: 0
FEVER: 0
HEADACHES: 0
ABDOMINAL PAIN: 0
PALPITATIONS: 0
CONSTIPATION: 0
CHILLS: 0
MEMORY LOSS: 1
HEARTBURN: 0
SPUTUM PRODUCTION: 0
DEPRESSION: 0
MYALGIAS: 1

## 2019-12-11 ASSESSMENT — COGNITIVE AND FUNCTIONAL STATUS - GENERAL
TOILETING: A LITTLE
DAILY ACTIVITIY SCORE: 18
DRESSING REGULAR UPPER BODY CLOTHING: A LITTLE
PERSONAL GROOMING: A LITTLE
HELP NEEDED FOR BATHING: A LOT
SUGGESTED CMS G CODE MODIFIER DAILY ACTIVITY: CK
DRESSING REGULAR LOWER BODY CLOTHING: A LITTLE

## 2019-12-11 NOTE — CARE PLAN
Problem: Safety  Goal: Will remain free from injury  Outcome: PROGRESSING AS EXPECTED   Pt is up with one assist to bathroom, bed alarm on, bed in lowest position, non skid socks on, call light within reach.   Problem: Bowel/Gastric:  Goal: Normal bowel function is maintained or improved  Outcome: PROGRESSING AS EXPECTED   Pt had bowel movement this morning.

## 2019-12-11 NOTE — PROGRESS NOTES
"   Orthopaedic Progress Note    Interval changes:  Patient doing well  Dressings CDI    ROS - Patient denies any new issues.  Pain well controlled.    /94   Pulse 81   Temp 36.8 °C (98.3 °F) (Temporal)   Resp 18   Ht 1.702 m (5' 7.01\")   Wt 76.2 kg (167 lb 15.9 oz)   SpO2 92%       Patient seen and examined  No acute distress  Breathing non labored  RRR  Left hip dressings CDI. Patient clearly fires tibialis anterior, EHL, and gastrocnemius/soleus. Sensation is intact to light touch throughout superficial peroneal, deep peroneal, tibial, saphenous, and sural nerve distributions. Strong and palpable 2+ dorsalis pedis and posterior tibial pulses with capillary refill less than 2 seconds. No lower leg tenderness or discomfort.  Recent Labs     12/10/19  0440   WBC 7.3   RBC 3.89*   HEMOGLOBIN 11.8*   HEMATOCRIT 36.6*   MCV 94.1   MCH 30.3   MCHC 32.2*   RDW 45.1   PLATELETCT 349   MPV 11.7       Active Hospital Problems    Diagnosis   • Urinary retention [R33.9]   • Acute blood loss anemia [D62]   • Femur fracture (HCC) [S72.90XA]   • Essential hypertension [I10]   • Prediabetes [R73.03]   • BPH (benign prostatic hyperplasia) [N40.0]   • Marijuana use, continuous [F12.90]       Assessment/Plan:  Cleared for DC by ortho pending medicine clearance  POD#12 S/P Intramedullary nailing of left femur.   Wt bearing status - WBAT  Wound care/Drains - dressings changed every other day by nursing  Future Procedures - none   Lovenox: Start 11/29, Duration-until ambulatory > 150'  Sutures/Staples out- 12-14 days post operatively  PT/OT-initiated  Antibiotics: completed  DVT Prophylaxis- TEDS/SCDs/Foot pumps  Garibay-none  Case Coordination for Discharge Planning - Disposition SNF   "

## 2019-12-11 NOTE — PROGRESS NOTES
Patient refusing q2h turns over night. Patient also refused 0500 vitals and was verbally aggressive with CNA. During med pass patient was increasingly verbally aggressive with nurse and attempted to be physically aggressive.

## 2019-12-11 NOTE — PROGRESS NOTES
Pt was educated on ambulation several times today. Pt refused to get up every time. Educated the pt that mobilization helps with healing. Pt continues to refuse.

## 2019-12-11 NOTE — PROGRESS NOTES
Hospital Medicine Daily Progress Note    Date of Service  12/10/2019    Chief Complaint  79 y.o. male admitted 11/27/2019 with ground level fall, hip pain     Hospital Course    79-year-old male past medical history of hypertension, diabetes presenting with ground-level fall, left hip pain.  He was getting out of his car when he slipped on ice and landed on his left hip.  He was unable to bear weight on his left side with significant amount of pain.  Please refer to H&P with Dr. Pacheco for further details. Xr showed Mild/moderately displaced subtrochanteric proximal left femur fracture. He was taken to OR 11/28, by Dr. Barboza and he did have Intramedullary nailing of left femur. He was being more confused 12/2. Narcotic were held. Vitals stable. Lab work unremarkable. 12/4 noted LADARIUS. IF fluid started. Mentation improved.         Interval Problem Update  Spoke with his son. His son tells me that this is baseline. 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 He smokes marijuana a lot. He is not cooperative most of the time. Family is trying to get him close to [place]. No neuro deficits. Electrolytes wnl. No signs of infection    12/4- mentation improved. Pt doing better. IV fluid due to LADARIUS,. Hoping transferring to SNF to [place]. Family willing to provide transportation. No urinary retention. Pt tells me that he is feeling better, eating better. Right eye conjunctivitis. Erythromycin for 2 days    12/5- some urinary retention. Pt was not taking flomax 2 weeks before admission. Worse last night from IVF. GFR normal. Pt medical cleared for transfer.   12/6- feeling better, after shah was placed. No complains. Pending placement.   12/7- feeling better after shah placed. He has no concerns. No event overnight. Pending placement   12/8- did not sleep last night, because roommate was screaming. No other complains. Asking to be transfer to [place]. He tells me that his son in [place], will help him. Pt has his own place.  He tells me that his daughter will help him. Still pending transfer. Medical stable.   12/9- no event. He would like to go home. I did reassure that we are working on transferring back to [place]. I did discuss with  to see if home health in [place] is an option discharge. He is medical stable. XR tomorrow per ortho.   12/10-easily arousable, reports improved pain control  Decreased range of motion secondary to pain  States that Erik, his friend can transfer him back home  Pending skilled nursing facility acceptance in [place]    Consultants/Specialty  Ortho     Code Status  Full code     Disposition  Inpatient     Review of Systems  Review of Systems   Constitutional: Negative for chills, diaphoresis, fever and malaise/fatigue.   Respiratory: Negative for cough and sputum production.    Cardiovascular: Negative for chest pain, palpitations and leg swelling.   Gastrointestinal: Negative for abdominal pain, constipation, heartburn, nausea and vomiting.   Genitourinary: Negative for dysuria, frequency, hematuria and urgency.        Trouble voiding    Musculoskeletal: Positive for joint pain and myalgias.   Neurological: Negative for dizziness and headaches.   Psychiatric/Behavioral: Positive for memory loss. Negative for depression.        Physical Exam  Temp:  [36.2 °C (97.1 °F)-36.8 °C (98.3 °F)] 36.2 °C (97.1 °F)  Pulse:  [] 96  Resp:  [17-18] 17  BP: (120-136)/(86-94) 126/86  SpO2:  [92 %-95 %] 95 %    Physical Exam  Vitals signs and nursing note reviewed.   Constitutional:       General: He is not in acute distress.     Appearance: Normal appearance.   HENT:      Head: Normocephalic and atraumatic.      Right Ear: External ear normal.      Left Ear: External ear normal.      Nose: No congestion or rhinorrhea.   Neck:      Musculoskeletal: Normal range of motion. No neck rigidity or muscular tenderness.      Vascular: No carotid bruit.   Cardiovascular:      Rate and Rhythm: Normal rate.       Heart sounds: No murmur.   Pulmonary:      Effort: Pulmonary effort is normal. No respiratory distress.   Abdominal:      General: Bowel sounds are normal. There is no distension.      Palpations: Abdomen is soft.   Musculoskeletal:      Comments: Left leg slight swelling and tenderness    Neurological:      General: No focal deficit present.      Mental Status: He is alert and oriented to person, place, and time.      Cranial Nerves: No cranial nerve deficit.      Sensory: No sensory deficit.      Coordination: Coordination normal.   Psychiatric:         Mood and Affect: Mood normal.         Behavior: Behavior normal.         Judgment: Judgment normal.      Comments: Cooperative with me         Fluids    Intake/Output Summary (Last 24 hours) at 12/10/2019 1725  Last data filed at 12/10/2019 0500  Gross per 24 hour   Intake 120 ml   Output 1250 ml   Net -1130 ml       Laboratory  Recent Labs     12/10/19  0440   WBC 7.3   RBC 3.89*   HEMOGLOBIN 11.8*   HEMATOCRIT 36.6*   MCV 94.1   MCH 30.3   MCHC 32.2*   RDW 45.1   PLATELETCT 349   MPV 11.7     Recent Labs     12/10/19  0440   SODIUM 136   POTASSIUM 4.2   CHLORIDE 104   CO2 25   GLUCOSE 106*   BUN 19   CREATININE 1.00   CALCIUM 8.7                   Imaging  DX-FEMUR-2+ LEFT   Final Result      Proximal left femur fracture status post ORIF in anatomic alignment.      DX-FEMUR-2+ LEFT   Final Result      Intraoperative evaluation of femur fracture fixation.      DX-PORTABLE FLUORO > 1 HOUR   Final Result      Intraoperative evaluation of femur fracture fixation.      DX-CHEST-PORTABLE (1 VIEW)   Final Result      No acute cardiopulmonary abnormality. No displaced rib fractures. No pneumothorax.      DX-HIP-COMPLETE - UNILATERAL 2+ LEFT   Final Result      1.  Mild/moderately displaced subtrochanteric proximal left femur fracture.   2.  Degenerative changes of the hips.      DX-FEMUR-1 VIEW LEFT   Final Result      Acute, obliquely oriented fracture of  subtrochanteric femoral diaphysis.           Assessment/Plan  * Femur fracture (HCC)  Assessment & Plan  Left sided subtrochanteric femur fracture   Cont with pain control   Pt/ot eval as appropriate  SNF to [place]   Family willing to provide transportation   Ok to provide pain medications if pt is in significant pain   12/5- primary care in [place] will try to arrange SNF . Continue PT/OT whenever possible while inpatient     12/10 encourage activity, pain control  Appears comfortable  [facility] [place] [facility], patient states his friend Erik can help with transport        Urinary retention  Assessment & Plan  On shah.   Will possible attempt to remove shah in couple of days if pt still here, otherwise, he will be dc with shah if snf is available  No signs of UTI      Acute blood loss anemia- (present on admission)  Assessment & Plan  Hemoglobin 10.1-11  Baseline hemoglobin 14  Related to surgery  Follow CBC closely  Transfuse as needed with target hemoglobin above 7  12/5-remain stable. Slight improved   12/6- remain stable     Marijuana use, continuous- (present on admission)  Assessment & Plan  Daily use     BPH (benign prostatic hyperplasia)  Assessment & Plan  Continue tamsulosin and finasteride   Continue shah   He will need to follow up with urology in [place]     Prediabetes  Assessment & Plan  Pt has no diabetes. He has prediabetes. a1c 5.7   He is 78 y/o. no need for metformin which has more side effects instead of benefits   Discontinue accu-check  Diet controled       Essential hypertension  Assessment & Plan  Unknown home medications at this time \  BP stable  Prn hydralazine ordered   Doing well without med.   Continue to monitor        VTE prophylaxis: lovenox

## 2019-12-11 NOTE — DISCHARGE PLANNING
Agency/Facility Name:  [facility]  P: [phone]  Spoke To: Admissions  Outcome: Attempted to follow up, however no answer. Voicemail was left.      Agency/Facility Name:  [facility]   P:  [phone]  Spoke To: Bea   Outcome: Referral received, under review. No beds available until after 12/16.     Agency/Facility Name:  [facility]   P: [phone]  Spoke To: Admissions   Outcome: Attempted to follow up, however no answer. Voicemail was left.

## 2019-12-11 NOTE — PROGRESS NOTES
Hospital Medicine Daily Progress Note    Date of Service  12/11/2019    Chief Complaint  79 y.o. male admitted 11/27/2019 with ground level fall, hip pain     Hospital Course    79-year-old male past medical history of hypertension, diabetes presenting with ground-level fall, left hip pain.  He was getting out of his car when he slipped on ice and landed on his left hip.  He was unable to bear weight on his left side with significant amount of pain.  Please refer to H&P with Dr. Pacheco for further details. Xr showed Mild/moderately displaced subtrochanteric proximal left femur fracture. He was taken to [place] 11/28, by Dr. Barboza and he did have Intramedullary nailing of left femur. He was being more confused 12/2. Narcotic were held. Vitals stable. Lab work unremarkable. 12/4 noted LADARIUS. IF fluid started. Mentation improved.         Interval Problem Update  Spoke with his son. His son tells me that this is baseline. His father has been in shelter for long time. He smokes marijuana a lot. He is not cooperative most of the time. Family is trying to get him close to [place]. No neuro deficits. Electrolytes wnl. No signs of infection    12/4- mentation improved. Pt doing better. IV fluid due to LADARIUS,. Hoping transferring to SNF to [place]. Family willing to provide transportation. No urinary retention. Pt tells me that he is feeling better, eating better. Right eye conjunctivitis. Erythromycin for 2 days    12/5- some urinary retention. Pt was not taking flomax 2 weeks before admission. Worse last night from IVF. GFR normal. Pt medical cleared for transfer.   12/6- feeling better, after shah was placed. No complains. Pending placement.   12/7- feeling better after shah placed. He has no concerns. No event overnight. Pending placement   12/8- did not sleep last night, because roommate was screaming. No other complains. Asking to be transfer to [place]. He tells me that his son in [place], will help him. Pt has his own place.  He tells me that his daughter will help him. Still pending transfer. Medical stable.   12/9- no event. He would like to go home. I did reassure that we are working on transferring back to [place]. I did discuss with  to see if home health in [place] is an option discharge. He is medical stable. XR tomorrow per ortho.   12/10-easily arousable, reports improved pain control  Decreased range of motion secondary to pain  States that Erik, his friend can transfer him back home  Pending skilled nursing facility acceptance in [place]    12/11 sitting up in chair, per patient requiring 1 person assist with transfers  Reports minimal lower extremity discomfort, worse with movement  Forgetful, continues to ask to discharge back to [place], reports that family including his 2 daughters will assist with care  Reviewed plan of care regarding placement    Consultants/Specialty  Ortho     Code Status  Full code     Disposition  Inpatient     Needs skilled nursing facility placement in [place],  assisting    Review of Systems  Review of Systems   Constitutional: Negative for chills, fever and malaise/fatigue.   Respiratory: Negative for sputum production.    Cardiovascular: Negative for chest pain, palpitations and leg swelling.   Gastrointestinal: Negative for abdominal pain, constipation and heartburn.   Genitourinary: Negative for dysuria, frequency and hematuria.        Trouble voiding    Musculoskeletal: Positive for joint pain and myalgias.   Neurological: Negative for headaches.   Psychiatric/Behavioral: Positive for memory loss. Negative for depression. The patient is not nervous/anxious.         Physical Exam  Temp:  [36.2 °C (97.1 °F)-36.9 °C (98.5 °F)] 36.9 °C (98.5 °F)  Pulse:  [94-96] 94  Resp:  [17] 17  BP: (126-130)/(84-86) 130/84  SpO2:  [92 %-95 %] 92 %    Physical Exam  Vitals signs and nursing note reviewed.   Constitutional:       General: He is not in acute distress.  HENT:      Head:  Normocephalic and atraumatic.      Right Ear: External ear normal.      Left Ear: External ear normal.   Neck:      Musculoskeletal: Normal range of motion. No neck rigidity.   Cardiovascular:      Rate and Rhythm: Normal rate.      Heart sounds: No murmur.   Pulmonary:      Effort: Pulmonary effort is normal. No respiratory distress.   Abdominal:      General: Bowel sounds are normal. There is no distension.      Palpations: Abdomen is soft.   Musculoskeletal:         General: No swelling or tenderness.      Comments: Left leg slight swelling and tenderness    Neurological:      General: No focal deficit present.      Mental Status: He is alert and oriented to person, place, and time.      Cranial Nerves: No cranial nerve deficit.      Sensory: No sensory deficit.      Deep Tendon Reflexes: Reflexes normal.   Psychiatric:         Mood and Affect: Mood normal.         Behavior: Behavior normal.      Comments: Cooperative with me         Fluids    Intake/Output Summary (Last 24 hours) at 12/11/2019 1123  Last data filed at 12/11/2019 0000  Gross per 24 hour   Intake --   Output 700 ml   Net -700 ml       Laboratory  Recent Labs     12/10/19  0440   WBC 7.3   RBC 3.89*   HEMOGLOBIN 11.8*   HEMATOCRIT 36.6*   MCV 94.1   MCH 30.3   MCHC 32.2*   RDW 45.1   PLATELETCT 349   MPV 11.7     Recent Labs     12/10/19  0440   SODIUM 136   POTASSIUM 4.2   CHLORIDE 104   CO2 25   GLUCOSE 106*   BUN 19   CREATININE 1.00   CALCIUM 8.7                   Imaging  DX-FEMUR-2+ LEFT   Final Result      Proximal left femur fracture status post ORIF in anatomic alignment.      DX-FEMUR-2+ LEFT   Final Result      Intraoperative evaluation of femur fracture fixation.      DX-PORTABLE FLUORO > 1 HOUR   Final Result      Intraoperative evaluation of femur fracture fixation.      DX-CHEST-PORTABLE (1 VIEW)   Final Result      No acute cardiopulmonary abnormality. No displaced rib fractures. No pneumothorax.      DX-HIP-COMPLETE - UNILATERAL 2+  LEFT   Final Result      1.  Mild/moderately displaced subtrochanteric proximal left femur fracture.   2.  Degenerative changes of the hips.      DX-FEMUR-1 VIEW LEFT   Final Result      Acute, obliquely oriented fracture of subtrochanteric femoral diaphysis.           Assessment/Plan  * Femur fracture (HCC)  Assessment & Plan  Left sided subtrochanteric femur fracture   Cont with pain control   Pt/ot eval as appropriate  SNF to [place]   Family willing to provide transportation   Ok to provide pain medications if pt is in significant pain   12/5- primary care in [place] will try to arrange SNF . Continue PT/OT whenever possible while inpatient     12/10 encourage activity, pain control  Appears comfortable  To follow-up with [facility], patient states his friend Erik can help with transport    12/11 requiring 1 person + assist for transfers  Plan for transfer to [place], when accepted        Urinary retention  Assessment & Plan  On shah.   Will possible attempt to remove shah in couple of days if pt still here, otherwise, he will be dc with shah if snf is available  No signs of UTI      Acute blood loss anemia- (present on admission)  Assessment & Plan  Hemoglobin 10.1-11  Baseline hemoglobin 14  Related to surgery  Follow CBC closely  Transfuse as needed with target hemoglobin above 7  12/5-remain stable. Slight improved   12/6- remain stable     Marijuana use, continuous- (present on admission)  Assessment & Plan  Daily use     BPH (benign prostatic hyperplasia)  Assessment & Plan  Continue tamsulosin and finasteride   Continue shah   He will need to follow up with urology in [place]     Prediabetes  Assessment & Plan  Pt has no diabetes. He has prediabetes. a1c 5.7   He is 80 y/o. no need for metformin which has more side effects instead of benefits   Discontinue accu-check  Diet controled       Essential hypertension  Assessment & Plan  Unknown home medications at this time \  BP  stable  Prn hydralazine ordered   Doing well without med.   Continue to monitor        VTE prophylaxis: lovenox

## 2019-12-11 NOTE — CARE PLAN
Problem: Safety  Goal: Will remain free from falls  Outcome: PROGRESSING AS EXPECTED   Patient continues to remain free from falls  Problem: Infection  Goal: Will remain free from infection  Outcome: PROGRESSING AS EXPECTED   Patient remains afebrile

## 2019-12-11 NOTE — PROGRESS NOTES
Report received. Assumed care. Pt in bed laying down. A/O x4. VSS. Responds appropriately. Denies chest pain and SOB. Assessment complete. Discussed POC, , pt verbalizes understanding. Explained importance of calling before getting OOB. Call light and belongings within reach. Bed alarm on. Bed in the lowest position. Treaded socks in place. Hourly rounding in progress.

## 2019-12-11 NOTE — THERAPY
"Occupational Therapy Treatment completed with focus on ADLs, ADL transfers and patient education.  Functional Status:  Pt was seen for Occupational Therapy treatment today, see Therapy Kardex for details. Treatment included education in breath control with activity and at rest, self pacing techs and energy conservation for pain management. Educated pt in safety awareness techs as well. Psychosocial intervention addressed. Pt willing to do therapy today without becoming irritated. Pt demonstrated set up for changing gown with UB dressing, CGA/Min A for LB dressing using AE seated EOB. Pt is impulsive and needed verbal cues for self pacing and conserving energy. Pt did c/o dizziness towards end of session. Pt stood with Min A using FWW and was able to do clothing management up over hips with CGA for standing balance only. Pt stated he went to BR with nursing just prior to OT session refusing to attempt again. CNA stated pt was Min A for toilet transfers and SBA for thoroughness with full toilet hygiene seated base. Pt also demonstrated Min A for Ambulating ADL's with FWW to chair at the bedside. Pt was left up in chair, call light in reach, bedside table in reach  and nursing is aware. RN updated on OT treatment findings and recommendations.  Continue Occupational Therapy services as per plan.    Plan of Care: Will benefit from Occupational Therapy 3 times per week  Discharge Recommendations:  Equipment Will Continue to Assess for Equipment Needs. Post-acute therapy: Discharge to a transitional care facility for continued skilled Occupational Therapy services.       See \"Rehab Therapy-Acute\" Patient Summary Report for complete documentation.   "

## 2019-12-11 NOTE — CARE PLAN
Problem: Safety  Goal: Will remain free from falls  Outcome: PROGRESSING AS EXPECTED  Intervention: Implement fall precautions  Flowsheets (Taken 12/10/2019 0977 by Biju Marie, Student)  Environmental Precautions: Personal Belongings, Wastebasket, Call Bell etc. in Easy Reach;Transferred to Stronger Side;Bed in Low Position;Communication Sign for Patients & Families;Mobility Assessed & Appropriate Sign Placed;Report Given to Other Health Care Providers Regarding Fall Risk;Treaded Slipper Socks on Patient (Pended)  Note:   Safety precautions in place. Call light within reach. Bed is low and in locked position. Hourly rounding in place.       Problem: Discharge Barriers/Planning  Goal: Patient's continuum of care needs will be met  Outcome: PROGRESSING AS EXPECTED  Intervention: Assess potential discharge barriers on admission and throughout hospital stay  Flowsheets (Taken 12/10/2019 1620)  Does Admitting Nurse Feel This Could be a Complex Discharge?: Yes  Note:   JEN assisting

## 2019-12-11 NOTE — NON-PROVIDER
"   Orthopaedic Progress Note    Interval changes:  Pt is pleasant. Pain well controlled by medication.      ROS - Patient denies any new issues today. Denies chest pain, SOB, or fever/chills.  Pain well controlled.    /84   Pulse 94   Temp 36.9 °C (98.5 °F) (Temporal)   Resp 17   Ht 1.702 m (5' 7.01\")   Wt 76.2 kg (167 lb 15.9 oz)   SpO2 92%       Patient seen and examined  No acute distress  Breathing non labored  RRR  Left hip dressing clean dry and intact. Dressing removed today and staples removed without issue.  Surgical incision is well approximated and is dry and clean.  surgical site was dressed with steri-strips and benzoin Tincture. Patient clearly fires tibialis anterior, EHL, and gastrocnemius/soleus. Sensation is intact to light touch throughout superficial peroneal, deep peroneal, tibial, saphenous, and sural nerve distributions. Strong and palpable 2+ dorsalis pedis and posterior tibial pulses with capillary refill less than 2 seconds. No lower leg tenderness or discomfort. cap refill < 2 sec.     Recent Labs     12/10/19  0440   WBC 7.3   RBC 3.89*   HEMOGLOBIN 11.8*   HEMATOCRIT 36.6*   MCV 94.1   MCH 30.3   MCHC 32.2*   RDW 45.1   PLATELETCT 349   MPV 11.7       Active Hospital Problems    Diagnosis   • Urinary retention [R33.9]   • Acute blood loss anemia [D62]   • Femur fracture (HCC) [S72.90XA]   • Essential hypertension [I10]   • Prediabetes [R73.03]   • BPH (benign prostatic hyperplasia) [N40.0]   • Marijuana use, continuous [F12.90]       Assessment/Plan:    POD#13 S/P intramedullary nailing of left femur  Wt bearing status - WBAT  Wound care- Staples removed today without issue. Wound margins are well approximated and show no signs of infection. Steri-strips placed. Continue to monitor steri-strips for signs of infection  Future Procedures - none  Lovenox: Start 11/29, Duration-until ambulatory > 150'  PT/OT-initiated  Antibiotics: completed  DVT Prophylaxis- TEDS/SCDs/Foot " pumps  Garibay- In place  Case Coordination for Discharge Planning - Disposition to SNF. Cleared for DC by Ortho, pending medicine clearance. F/u with Dr. Timoteo Crook 1 week after discharge

## 2019-12-12 PROBLEM — R45.1 AGITATION: Status: ACTIVE | Noted: 2019-12-12

## 2019-12-12 PROCEDURE — A9270 NON-COVERED ITEM OR SERVICE: HCPCS | Performed by: INTERNAL MEDICINE

## 2019-12-12 PROCEDURE — 700102 HCHG RX REV CODE 250 W/ 637 OVERRIDE(OP): Performed by: PHYSICIAN ASSISTANT

## 2019-12-12 PROCEDURE — A9270 NON-COVERED ITEM OR SERVICE: HCPCS | Performed by: PHYSICIAN ASSISTANT

## 2019-12-12 PROCEDURE — 92610 EVALUATE SWALLOWING FUNCTION: CPT

## 2019-12-12 PROCEDURE — 700101 HCHG RX REV CODE 250: Performed by: INTERNAL MEDICINE

## 2019-12-12 PROCEDURE — 700102 HCHG RX REV CODE 250 W/ 637 OVERRIDE(OP): Performed by: INTERNAL MEDICINE

## 2019-12-12 PROCEDURE — 770006 HCHG ROOM/CARE - MED/SURG/GYN SEMI*

## 2019-12-12 PROCEDURE — 99232 SBSQ HOSP IP/OBS MODERATE 35: CPT | Performed by: INTERNAL MEDICINE

## 2019-12-12 PROCEDURE — 700111 HCHG RX REV CODE 636 W/ 250 OVERRIDE (IP): Performed by: ORTHOPAEDIC SURGERY

## 2019-12-12 RX ORDER — QUETIAPINE FUMARATE 25 MG/1
25 TABLET, FILM COATED ORAL 2 TIMES DAILY
Status: DISCONTINUED | OUTPATIENT
Start: 2019-12-12 | End: 2019-12-17

## 2019-12-12 RX ADMIN — LIDOCAINE 1 PATCH: 50 PATCH TOPICAL at 18:00

## 2019-12-12 RX ADMIN — NICOTINE TRANSDERMAL SYSTEM 21 MG: 21 PATCH, EXTENDED RELEASE TRANSDERMAL at 04:40

## 2019-12-12 RX ADMIN — QUETIAPINE FUMARATE 25 MG: 25 TABLET ORAL at 10:39

## 2019-12-12 RX ADMIN — ENOXAPARIN SODIUM 40 MG: 100 INJECTION SUBCUTANEOUS at 04:40

## 2019-12-12 RX ADMIN — FINASTERIDE 5 MG: 5 TABLET, FILM COATED ORAL at 04:40

## 2019-12-12 RX ADMIN — TAMSULOSIN HYDROCHLORIDE 0.4 MG: 0.4 CAPSULE ORAL at 09:04

## 2019-12-12 RX ADMIN — QUETIAPINE FUMARATE 25 MG: 25 TABLET ORAL at 17:05

## 2019-12-12 ASSESSMENT — ENCOUNTER SYMPTOMS
CHILLS: 0
FEVER: 0
PALPITATIONS: 0
SPUTUM PRODUCTION: 0
NAUSEA: 0
NERVOUS/ANXIOUS: 0
DIAPHORESIS: 0
HEADACHES: 0
COUGH: 0
CONSTIPATION: 0
MEMORY LOSS: 1
ABDOMINAL PAIN: 0
DIZZINESS: 0
MYALGIAS: 1

## 2019-12-12 NOTE — ASSESSMENT & PLAN NOTE
12/12 intermittent agitation with aggression  Memory loss  We will start trial of Seroquel, limit for sedation    12/14 behavior appropriate  Continue Seroquel    12/17- only at bedtime seroquel. To monitor with primary care or geriatric in [place].

## 2019-12-12 NOTE — DISCHARGE PLANNING
Agency/Facility Name: [facility]  Spoke To: Lucas  Outcome: Patient declined due to care exceeding capacity.

## 2019-12-12 NOTE — WOUND TEAM
[facility]  Inpatient Services  Initial Wound and Skin Care Evaluation    Admission Date:  11/27/2019   HPI, PMH, SH: Reviewed  Unit where seen by Wound Team: [id number]    WOUND CONSULT RELATED TO:  Skin tear sacrum    SUBJECTIVE:  Pt reports that he has been up in the chair     Self Report / Pain Level:  No report of pain       OBJECTIVE:  Pt in bed, with mepilex on sacrum    WOUND TYPE, LOCATION, CHARACTERISTICS (Pressure ulcers: location, stage, POA or date identified)          Wound 12/09/19 Other (comment) Coccyx (Active)   Site Assessment [place] 12/12/2019  1:00 PM   Cheri-wound Assessment Pink 12/12/2019  1:00 PM   Margins Attached edges;Undefined edges 12/12/2019  1:00 PM   Tunneling 0 cm 12/12/2019  1:00 PM   Undermining 0 cm 12/12/2019  1:00 PM   Closure Secondary intention 12/12/2019  1:00 PM   Drainage Amount None 12/12/2019  1:00 PM   Non-staged Wound Description Partial thickness 12/12/2019  1:00 PM   Treatments Site care;Cleansed 12/12/2019  1:00 PM   Cleansing Approved Wound Cleanser 12/12/2019  1:00 PM   Periwound Protectant Skin Protectant wipes to Periwound 12/12/2019  1:00 PM   Dressing Options Mepilex 12/12/2019  1:00 PM   Dressing Changed Reinforced 12/12/2019  1:00 PM   Dressing Status Clean;Dry;Intact 12/12/2019  9:32 AM   Dressing Change Frequency Every 72 hrs 12/12/2019  1:00 PM   NEXT Dressing Change  12/13/19 12/12/2019  1:00 PM   NEXT Weekly Photo (Inpatient Only) 12/13/19 12/12/2019  1:00 PM   WOUND NURSE ONLY - Odor None 12/12/2019  1:00 PM   WOUND NURSE ONLY - Exposed Structures None 12/12/2019  1:00 PM   WOUND NURSE ONLY - Tissue Type and Percentage pink epithelial 12/12/2019  1:00 PM   WOUND NURSE ONLY - Time Spent with Patient (mins) 60 12/12/2019  1:00 PM          Lab Values:    WBC:       Lab Results   Component Value Date/Time    WBC 7.3 12/10/2019 04:40 AM    RBC 3.89 (L) 12/10/2019 04:40 AM     AIC:      Lab Results   Component Value Date/Time    HBA1C 5.7 (H)  12/04/2019 04:42 AM            Culture:   Completed     INTERVENTIONS BY WOUND TEAM:  Dressing peeled back, area examined, peeling skin present at sacrococcygeal area revealing blanching epithelial.  Mepilex replaced    Dressing selection:  mepilex        Interdisciplinary consultation:  RN, patient    EVALUATION: Pt with superficial skin tear, off loading in place in place  Factors affecting wound healing:  Immobility, DM, HTN  Goals:  Steady decrease in wound area and depth weekly     NURSING PLAN OF CARE ORDERS (X):    Dressing changes: See Dressing Care orders:   X  Skin care: See Skin Care orders:   Rectal tube care: See Rectal Tube Care orders:   Other orders:    RSKIN: CURRENT (X) ORDERED (O)  Q shift Michael:  X  Q shift pressure point assessments:  X  Pressure redistribution mattress        Waffle overlay  YANIRA      Bariatric YANIRA      Bariatric foam        Heel float boots       Heels floated on pillows  X    Barrier wipes      Barrier Cream      Barrier paste      Sacral silicone dressing    X  Silicone O2 tubing      Anchorfast      Trach with Optifoam split foam       Waffle cushion      Rectal tube or BMS      Antifungal tx    Turn q 2 hours   X  Up to chair   X  Ambulate   PT/OT   X  Dietician      PO     TF   TPN   NPO   # days   Other       WOUND TEAM PLAN OF CARE (X):   NPWT change 3 x week:        Dressing changes by wound team:       Follow up as needed:     X  Other (explain):    Anticipated discharge plans (X):  SNF:    X pt will likely not require ongoing wound care upon discharge from hospital        Home Care:           Outpatient Wound Center:            Self Care:            Other:

## 2019-12-12 NOTE — THERAPY
"Speech Language Therapy Clinical Swallow Evaluation completed.  Functional Status: Pt was alert, upset about being placed on thickened liquids yesterday, states he refused them this morning. Pt was agreeable only to raising HOB to 30 degrees and having 2 ice chips, sips of plain water, 2 bites of applesauce and 1 pricilla cracker. He refused all other requests and became easily frustrated/angry. No overt facial asymmetries or oral motor deficits appreciated. Pt does have ill-fitting upper/lower dentures, which he did NOT want to wear for the assessment but did with encouragement after cursing at this SLP. Mastication of cracker was prolonged and inefficient but functional. Pt reports he doesn't eat hard foods like steak and is ok with having ground meat instead. Pharyngeal swallow response was timely and hyolaryngeal excursion palpated as complete. No s/sx of aspiration occurred with consecutive cup sips of thins or with any solids trialed. Due to ill-fitting dentures, would recommend continuing Dysphagia 2 solids and thin liquids. This may be least restrictive diet and consistent with baseline. SLP will follow to ensure diet tolerance.     Recommendations - Diet:  Dysphagia II, Thin Liquid                          Strategies: Head of Bed at 90 Degrees                          Medication Administration: Float Whole with Puree or Whole with Liquid Wash (per pt preference)  Plan of Care: Will benefit from Speech Therapy 2 times per week  Post-Acute Therapy: Currently anticipate no further skilled therapy needs once patient is discharged from the inpatient setting.    See \"Rehab Therapy-Acute\" Patient Summary Report for complete documentation.   "

## 2019-12-12 NOTE — DISCHARGE PLANNING
Contacted pts son Nazario Cox Jr.  Son states that family wants pt to come home, states that they can take care of pt, and if needed can take him to a facility to be admitted in [place]. Discussed the need for a safe discharge for pt and the conversation with Ck at [facility]. Son states that the plan after discharge is for the pt to go back to his own residence in [place]. Will f/u with medical team regarding conversations with Ck from [facility], and son Nazario Reeves

## 2019-12-12 NOTE — DISCHARGE PLANNING
Contacted Ck at [facility] in [place], [place].  Ck states that pt is a smoker and that they do not accept smoking pts. Also, she would need to know the pt discharge plan and what inhalants pt used prior to discharge besides marijuana. Ck states that she may not have a bed available until next week. Will f/u with Ck after further consultation with pt and medical team.

## 2019-12-12 NOTE — PROGRESS NOTES
Patient yelling in pain and grabbing leg. Patient given PRN pain medication. Patient became verbally aggressive with staff. Patient given ice pack for painful area. Able to deescalate patient behavior.

## 2019-12-12 NOTE — CARE PLAN
Problem: Safety  Goal: Will remain free from injury  Outcome: PROGRESSING AS EXPECTED   Bed alarm in use, Bed in lowest position, call within reach, rounding in place, moderate fall risk  Problem: Infection  Goal: Will remain free from infection  Outcome: PROGRESSING AS EXPECTED   Standard precautions in place, patient educated on proper hand washing, nutrition promoted, IS used, dressing is clean dry intact

## 2019-12-12 NOTE — CARE PLAN
Problem: Bowel/Gastric:  Goal: Will not experience complications related to bowel motility  Outcome: PROGRESSING AS EXPECTED   Patient has not experienced complications related to bowel motility  Problem: Urinary Elimination:  Goal: Ability to reestablish a normal urinary elimination pattern will improve  Outcome: PROGRESSING SLOWER THAN EXPECTED   Patient continues to have indwelling catheter for urinary retention

## 2019-12-12 NOTE — DISCHARGE PLANNING
Received a voice message from Ck at [facility] in [place], [place], regarding the referral sent to [facility]. Ck states in her voice message that the facility has no available beds. This RNCM called [facility] to discuss whether or not pt could be accepted pending bed availibility, no answer, left voice message x 2, last at 1445. [facility] [phone].

## 2019-12-12 NOTE — PROGRESS NOTES
Hospital Medicine Daily Progress Note    Date of Service  12/12/2019    Chief Complaint  79 y.o. male admitted 11/27/2019 with ground level fall, hip pain     Hospital Course    79-year-old male past medical history of hypertension, diabetes presenting with ground-level fall, left hip pain.  He was getting out of his car when he slipped on ice and landed on his left hip.  He was unable to bear weight on his left side with significant amount of pain.  Please refer to H&P with Dr. Pacheco for further details. Xr showed Mild/moderately displaced subtrochanteric proximal left femur fracture. He was taken to OR 11/28, by Dr. Barboza and he did have Intramedullary nailing of left femur. He was being more confused 12/2. Narcotic were held. Vitals stable. Lab work unremarkable. 12/4 noted LADARIUS. IF fluid started. Mentation improved.         Interval Problem Update  Spoke with his son. His son tells me that this is baseline. His father has been in [facility] for long time. He smokes marijuana a lot. He is not cooperative most of the time. Family is trying to get him close to [place]. No neuro deficits. Electrolytes wnl. No signs of infection    12/4- mentation improved. Pt doing better. IV fluid due to LADARIUS,. Hoping transferring to SNF to [place]. Family willing to provide transportation. No urinary retention. Pt tells me that he is feeling better, eating better. Right eye conjunctivitis. Erythromycin for 2 days    12/5- some urinary retention. Pt was not taking flomax 2 weeks before admission. Worse last night from IVF. GFR normal. Pt medical cleared for transfer.   12/6- feeling better, after shah was placed. No complains. Pending placement.   12/7- feeling better after shah placed. He has no concerns. No event overnight. Pending placement   12/8- did not sleep last night, because roommate was screaming. No other complains. Asking to be transfer to [place]. He tells me that his son in [place], will help him. Pt has his own place.  He tells me that his daughter will help him. Still pending transfer. Medical stable.   12/9- no event. He would like to go home. I did reassure that we are working on transferring back to [place]. I did discuss with  to see if home health in [place] is an option discharge. He is medical stable. XR tomorrow per ortho.   12/10-easily arousable, reports improved pain control  Decreased range of motion secondary to pain  States that Erik, his friend can transfer him back home  Pending skilled nursing facility acceptance in [place]    12/11 sitting up in chair, per patient requiring 1 person assist with transfers  Reports minimal lower extremity discomfort, worse with movement  Forgetful, continues to ask to discharge back to [place], reports that family including his 2 daughters will assist with care  Reviewed plan of care regarding placement    12/12 agitation intermittently, pleasant on exam  Reports improved pain control      Consultants/Specialty  Ortho     Code Status  Full code     Disposition  Inpatient     Needs skilled nursing facility placement in [place],  assisting    Review of Systems  Review of Systems   Constitutional: Negative for chills, diaphoresis, fever and malaise/fatigue.   Respiratory: Negative for cough and sputum production.    Cardiovascular: Negative for chest pain, palpitations and leg swelling.   Gastrointestinal: Negative for abdominal pain, constipation and nausea.   Genitourinary: Negative for dysuria, frequency and hematuria.        Trouble voiding    Musculoskeletal: Positive for joint pain and myalgias.   Neurological: Negative for dizziness and headaches.   Psychiatric/Behavioral: Positive for memory loss. The patient is not nervous/anxious.         Physical Exam  Temp:  [36.2 °C (97.2 °F)-37.4 °C (99.4 °F)] 36.7 °C (98.1 °F)  Pulse:  [] 97  Resp:  [16-19] 19  BP: (100-129)/(71-80) 129/80  SpO2:  [90 %-94 %] 93 %    Physical Exam  Vitals signs and nursing  note reviewed.   Constitutional:       General: He is not in acute distress.     Appearance: He is not ill-appearing.   HENT:      Head: Normocephalic and atraumatic.   Neck:      Musculoskeletal: Normal range of motion. No neck rigidity or muscular tenderness.   Cardiovascular:      Rate and Rhythm: Normal rate.      Heart sounds: No murmur.   Pulmonary:      Effort: Pulmonary effort is normal. No respiratory distress.   Chest:      Chest wall: No tenderness.   Abdominal:      General: Bowel sounds are normal. There is no distension.      Palpations: Abdomen is soft.   Musculoskeletal:         General: No swelling.      Comments: Left leg slight swelling and tenderness    Neurological:      General: No focal deficit present.      Mental Status: He is alert.      Cranial Nerves: No cranial nerve deficit.      Sensory: No sensory deficit.      Deep Tendon Reflexes: Reflexes normal.   Psychiatric:         Mood and Affect: Mood normal.         Behavior: Behavior normal.      Comments: Cooperative with me         Fluids    Intake/Output Summary (Last 24 hours) at 12/12/2019 1340  Last data filed at 12/12/2019 0900  Gross per 24 hour   Intake 240 ml   Output 600 ml   Net -360 ml       Laboratory  Recent Labs     12/10/19  0440   WBC 7.3   RBC 3.89*   HEMOGLOBIN 11.8*   HEMATOCRIT 36.6*   MCV 94.1   MCH 30.3   MCHC 32.2*   RDW 45.1   PLATELETCT 349   MPV 11.7     Recent Labs     12/10/19  0440   SODIUM 136   POTASSIUM 4.2   CHLORIDE 104   CO2 25   GLUCOSE 106*   BUN 19   CREATININE 1.00   CALCIUM 8.7                   Imaging  DX-FEMUR-2+ LEFT   Final Result      Proximal left femur fracture status post ORIF in anatomic alignment.      DX-FEMUR-2+ LEFT   Final Result      Intraoperative evaluation of femur fracture fixation.      DX-PORTABLE FLUORO > 1 HOUR   Final Result      Intraoperative evaluation of femur fracture fixation.      DX-CHEST-PORTABLE (1 VIEW)   Final Result      No acute cardiopulmonary abnormality. No  displaced rib fractures. No pneumothorax.      DX-HIP-COMPLETE - UNILATERAL 2+ LEFT   Final Result      1.  Mild/moderately displaced subtrochanteric proximal left femur fracture.   2.  Degenerative changes of the hips.      DX-FEMUR-1 VIEW LEFT   Final Result      Acute, obliquely oriented fracture of subtrochanteric femoral diaphysis.           Assessment/Plan  * Femur fracture (HCC)  Assessment & Plan  Left sided subtrochanteric femur fracture   Cont with pain control   Pt/ot eval as appropriate  SNF to [place]   Family willing to provide transportation   Ok to provide pain medications if pt is in significant pain   12/5- primary care in [place] will try to arrange SNF . Continue PT/OT whenever possible while inpatient     12/10 encourage activity, pain control  Appears comfortable  To follow-up with [facility], patient states his friend Erik can help with transport    12/11 requiring 1 person + assist for transfers  Plan for transfer to [place], when accepted    12/12 staples removed, PT OT with min max assist  Pending transfer to skilled nursing facility in [place]  We will need to coordinate with family regarding transfer   assisting      Agitation  Assessment & Plan  12/12 intermittent agitation with aggression  Memory loss  We will start trial of Seroquel, limit for sedation    Urinary retention  Assessment & Plan  On shah.   Will possible attempt to remove shah in couple of days if pt still here, otherwise, he will be dc with shah if snf is available  No signs of UTI      Acute blood loss anemia- (present on admission)  Assessment & Plan  Hemoglobin 10.1-11  Baseline hemoglobin 14  Related to surgery  Follow CBC closely  Transfuse as needed with target hemoglobin above 7  12/5-remain stable. Slight improved   12/6- remain stable     Marijuana use, continuous- (present on admission)  Assessment & Plan  Daily use     BPH (benign prostatic hyperplasia)  Assessment &  Plan  Continue tamsulosin and finasteride   Continue shah   He will need to follow up with urology in [place]     Prediabetes  Assessment & Plan  Pt has no diabetes. He has prediabetes. a1c 5.7   He is 78 y/o. no need for metformin which has more side effects instead of benefits   Discontinue accu-check  Diet controled       Essential hypertension  Assessment & Plan  Unknown home medications at this time \  BP stable  Prn hydralazine ordered   Doing well without med.   Continue to monitor        VTE prophylaxis: lovenox

## 2019-12-13 PROCEDURE — A9270 NON-COVERED ITEM OR SERVICE: HCPCS | Performed by: INTERNAL MEDICINE

## 2019-12-13 PROCEDURE — A9270 NON-COVERED ITEM OR SERVICE: HCPCS | Performed by: HOSPITALIST

## 2019-12-13 PROCEDURE — 770006 HCHG ROOM/CARE - MED/SURG/GYN SEMI*

## 2019-12-13 PROCEDURE — 700102 HCHG RX REV CODE 250 W/ 637 OVERRIDE(OP): Performed by: HOSPITALIST

## 2019-12-13 PROCEDURE — 700102 HCHG RX REV CODE 250 W/ 637 OVERRIDE(OP): Performed by: INTERNAL MEDICINE

## 2019-12-13 PROCEDURE — 99232 SBSQ HOSP IP/OBS MODERATE 35: CPT | Performed by: INTERNAL MEDICINE

## 2019-12-13 RX ADMIN — QUETIAPINE FUMARATE 25 MG: 25 TABLET ORAL at 17:44

## 2019-12-13 RX ADMIN — SENNOSIDES AND DOCUSATE SODIUM 2 TABLET: 8.6; 5 TABLET ORAL at 17:43

## 2019-12-13 NOTE — PROGRESS NOTES
Checked the patients medication given from downtime, checked for any new orders. No new orders given or medications changed.

## 2019-12-13 NOTE — PROGRESS NOTES
"   Orthopaedic PA Progress Note    Interval changes:Doing well, eager to advance mobilization, wants to try crutches, OK for DC from Ortho standpoint    ROS - Patient denies any new issues. No chest pain, dyspnea, or fever.  Pain well controlled.    /86   Pulse 96   Temp 37.2 °C (98.9 °F) (Temporal)   Resp 16   Ht 1.702 m (5' 7.01\")   Wt 76.2 kg (167 lb 15.9 oz)   SpO2 95%     Patient seen and examined  No acute distress  Breathing non labored  RRR  Surgical dressing is clean, dry, and intact. Staples out, steri-strips and Benzoin placed./ Patient clearly fires tibialis anterior, EHL, and gastrocnemius/soleus. Sensation is intact to light touch throughout superficial peroneal, deep peroneal, tibial, saphenous, and sural nerve distributions. Strong and palpable 2+ dorsalis pedis and posterior tibial pulses with capillary refill less than 2 seconds. No lower leg tenderness or discomfort.    Recent Labs     12/10/19  0440   WBC 7.3   RBC 3.89*   HEMOGLOBIN 11.8*   HEMATOCRIT 36.6*   MCV 94.1   MCH 30.3   MCHC 32.2*   RDW 45.1   PLATELETCT 349   MPV 11.7       Active Hospital Problems    Diagnosis   • Agitation [R45.1]   • Urinary retention [R33.9]   • Acute blood loss anemia [D62]   • Femur fracture (HCC) [S72.90XA]   • Essential hypertension [I10]   • Prediabetes [R73.03]   • BPH (benign prostatic hyperplasia) [N40.0]   • Marijuana use, continuous [F12.90]     Assessment/Plan:  Cleared for DC by ortho pending medicine clearance  POD#14 S/P Intramedullary nailing of left femur.   Wt bearing status - WBAT  Wound care/Drains - dressings changed every other day by nursing  Future Procedures - none   Lovenox: Start 11/29, Duration-until ambulatory > 150'  All staples out LLE  PT/OT-initiated  Antibiotics: completed  DVT Prophylaxis- TEDS/SCDs/Foot pumps  Garibay-none  Case Coordination for Discharge Planning - Disposition per Med/PT/OT, likely [place]: will need to follow up with Orthopaedic Trauma locally with " XRs in 1 week.

## 2019-12-14 PROCEDURE — 700102 HCHG RX REV CODE 250 W/ 637 OVERRIDE(OP): Performed by: INTERNAL MEDICINE

## 2019-12-14 PROCEDURE — 770006 HCHG ROOM/CARE - MED/SURG/GYN SEMI*

## 2019-12-14 PROCEDURE — A9270 NON-COVERED ITEM OR SERVICE: HCPCS | Performed by: INTERNAL MEDICINE

## 2019-12-14 PROCEDURE — 700111 HCHG RX REV CODE 636 W/ 250 OVERRIDE (IP): Performed by: ORTHOPAEDIC SURGERY

## 2019-12-14 PROCEDURE — 51798 US URINE CAPACITY MEASURE: CPT

## 2019-12-14 PROCEDURE — A9270 NON-COVERED ITEM OR SERVICE: HCPCS | Performed by: HOSPITALIST

## 2019-12-14 PROCEDURE — 700102 HCHG RX REV CODE 250 W/ 637 OVERRIDE(OP): Performed by: HOSPITALIST

## 2019-12-14 PROCEDURE — 99233 SBSQ HOSP IP/OBS HIGH 50: CPT | Performed by: INTERNAL MEDICINE

## 2019-12-14 PROCEDURE — 700102 HCHG RX REV CODE 250 W/ 637 OVERRIDE(OP): Performed by: PHYSICIAN ASSISTANT

## 2019-12-14 PROCEDURE — 700101 HCHG RX REV CODE 250: Performed by: INTERNAL MEDICINE

## 2019-12-14 PROCEDURE — A9270 NON-COVERED ITEM OR SERVICE: HCPCS | Performed by: PHYSICIAN ASSISTANT

## 2019-12-14 RX ADMIN — SENNOSIDES AND DOCUSATE SODIUM 2 TABLET: 8.6; 5 TABLET ORAL at 05:41

## 2019-12-14 RX ADMIN — QUETIAPINE FUMARATE 25 MG: 25 TABLET ORAL at 16:59

## 2019-12-14 RX ADMIN — ENOXAPARIN SODIUM 40 MG: 100 INJECTION SUBCUTANEOUS at 05:41

## 2019-12-14 RX ADMIN — QUETIAPINE FUMARATE 25 MG: 25 TABLET ORAL at 05:40

## 2019-12-14 RX ADMIN — SENNOSIDES AND DOCUSATE SODIUM 2 TABLET: 8.6; 5 TABLET ORAL at 16:59

## 2019-12-14 RX ADMIN — NICOTINE TRANSDERMAL SYSTEM 21 MG: 21 PATCH, EXTENDED RELEASE TRANSDERMAL at 05:41

## 2019-12-14 RX ADMIN — POLYETHYLENE GLYCOL 3350 1 PACKET: 17 POWDER, FOR SOLUTION ORAL at 16:59

## 2019-12-14 RX ADMIN — CYCLOBENZAPRINE 10 MG: 10 TABLET, FILM COATED ORAL at 10:59

## 2019-12-14 RX ADMIN — FINASTERIDE 5 MG: 5 TABLET, FILM COATED ORAL at 05:40

## 2019-12-14 RX ADMIN — OXYCODONE HYDROCHLORIDE 5 MG: 5 TABLET ORAL at 14:53

## 2019-12-14 RX ADMIN — LIDOCAINE 1 PATCH: 50 PATCH TOPICAL at 15:17

## 2019-12-14 RX ADMIN — TAMSULOSIN HYDROCHLORIDE 0.4 MG: 0.4 CAPSULE ORAL at 10:35

## 2019-12-14 RX ADMIN — CYCLOBENZAPRINE 10 MG: 10 TABLET, FILM COATED ORAL at 21:47

## 2019-12-14 ASSESSMENT — ENCOUNTER SYMPTOMS
NERVOUS/ANXIOUS: 0
HEARTBURN: 0
DEPRESSION: 0
SPUTUM PRODUCTION: 0
PALPITATIONS: 0
CHILLS: 0
MEMORY LOSS: 1
MYALGIAS: 1
CONSTIPATION: 0
FEVER: 0
COUGH: 0
HEADACHES: 0
ABDOMINAL PAIN: 0

## 2019-12-14 NOTE — PROGRESS NOTES
"Pt up to chair for meal, generally calm, cooperative, pleasant affect. RN present for rounds by Dr Barr POC reviewed which includes shah removal, mobility, discharge planning per . Family will come from OR and be taking pt back to [place] with them. When family here they will need teaching on pt mobility and ADL needs. DME / FWW has been ordered. RN encouraged CDB w/ \"I.S.\" will reinforce t/o day. Fall and safety precautions in place, bed alarm on, pt uses call light appropriately. Pt is independent w/ turns for skin integrity. Pt has Lovenox for VTE, currently SCDs off while pt in chair. Hourly rounds ongoing, call light w/in reach.   "

## 2019-12-14 NOTE — PROGRESS NOTES
Received a call from son Ahmet stating family was supposed to received a call from the  yesterday in regards to dc POC and did not. Family wants to come get him and they do not care if it is considered AMA.     Spoke to pt NIMO Neal and Dr Barr, both in agreement that if safe POC is made by family, both are in agreement with dc today or whenever family can make travel arrangements.     Ahmet updated that they can start to plan trip to get their father and dc from unit. Ahmet will call back after he checks weather and speaks with siblings to form plan.

## 2019-12-14 NOTE — PROGRESS NOTES
Hospital Medicine Daily Progress Note    Date of Service  12/14/2019    Chief Complaint  79 y.o. male admitted 11/27/2019 with ground level fall, hip pain     Hospital Course    79-year-old male past medical history of hypertension, diabetes presenting with ground-level fall, left hip pain.  He was getting out of his car when he slipped on ice and landed on his left hip.  He was unable to bear weight on his left side with significant amount of pain.  Please refer to H&P with Dr. Pacheco for further details. Xr showed Mild/moderately displaced subtrochanteric proximal left femur fracture. He was taken to OR 11/28, by Dr. Barboza and he did have Intramedullary nailing of left femur. He was being more confused 12/2. Narcotic were held. Vitals stable. Lab work unremarkable. 12/4 noted LADARIUS. IF fluid started. Mentation improved.         Interval Problem Update  Spoke with his son. His son tells me that this is baseline. His father has been in [facility] for long time. He smokes marijuana a lot. He is not cooperative most of the time. Family is trying to get him close to [place]. No neuro deficits. Electrolytes wnl. No signs of infection    12/4- mentation improved. Pt doing better. IV fluid due to LADARIUS,. Hoping transferring to SNF to [place]. Family willing to provide transportation. No urinary retention. Pt tells me that he is feeling better, eating better. Right eye conjunctivitis. Erythromycin for 2 days    12/5- some urinary retention. Pt was not taking flomax 2 weeks before admission. Worse last night from IVF. GFR normal. Pt medical cleared for transfer.   12/6- feeling better, after shah was placed. No complains. Pending placement.   12/7- feeling better after shah placed. He has no concerns. No event overnight. Pending placement   12/8- did not sleep last night, because roommate was screaming. No other complains. Asking to be transfer to [place]. He tells me that his son in [place], will help him. Pt has his own place.  He tells me that his daughter will help him. Still pending transfer. Medical stable.   12/9- no event. He would like to go home. I did reassure that we are working on transferring back to [place]. I did discuss with  to see if home health in [place] is an option discharge. He is medical stable. XR tomorrow per ortho.   12/10-easily arousable, reports improved pain control  Decreased range of motion secondary to pain  States that Erik, his friend can transfer him back home  Pending skilled nursing facility acceptance in [place]    12/11 sitting up in chair, per patient requiring 1 person assist with transfers  Reports minimal lower extremity discomfort, worse with movement  Forgetful, continues to ask to discharge back to [place], reports that family including his 2 daughters will assist with care  Reviewed plan of care regarding placement    12/12 agitation intermittently, pleasant on exam  Reports improved pain control    12/14 patient standby assist with ambulation  Improved activity tolerance  Forgetful, catheter still in place  Plan of care reviewed with patient and RN      Consultants/Specialty  Ortho     Code Status  Full code     Disposition  Inpatient     Needs skilled nursing facility placement in [place],  assisting    Review of Systems  Review of Systems   Constitutional: Negative for chills, fever and malaise/fatigue.   Respiratory: Negative for cough and sputum production.    Cardiovascular: Negative for chest pain, palpitations and leg swelling.   Gastrointestinal: Negative for abdominal pain, constipation and heartburn.   Genitourinary: Negative for dysuria and hematuria.        Trouble voiding    Musculoskeletal: Positive for myalgias. Negative for joint pain.   Neurological: Negative for headaches.   Psychiatric/Behavioral: Positive for memory loss. Negative for depression. The patient is not nervous/anxious.         Physical Exam  Temp:  [36.7 °C (98 °F)-37.1 °C (98.8 °F)]  36.9 °C (98.4 °F)  Pulse:  [] 89  Resp:  [15-17] 15  BP: (111-122)/(80-83) 119/80  SpO2:  [91 %-92 %] 92 %    Physical Exam  Vitals signs and nursing note reviewed.   Constitutional:       General: He is not in acute distress.     Appearance: He is not ill-appearing or diaphoretic.   HENT:      Head: Normocephalic and atraumatic.      Right Ear: External ear normal.      Left Ear: External ear normal.      Mouth/Throat:      Pharynx: No oropharyngeal exudate.   Eyes:      Extraocular Movements: Extraocular movements intact.      Pupils: Pupils are equal, round, and reactive to light.   Neck:      Musculoskeletal: Normal range of motion. No neck rigidity.   Cardiovascular:      Rate and Rhythm: Normal rate.      Heart sounds: No murmur.   Pulmonary:      Effort: Pulmonary effort is normal. No respiratory distress.   Chest:      Chest wall: No tenderness.   Abdominal:      General: Bowel sounds are normal.      Palpations: Abdomen is soft. There is no mass.   Musculoskeletal:         General: No swelling.      Comments: Left leg slight swelling and tenderness    Neurological:      General: No focal deficit present.      Mental Status: He is alert.      Cranial Nerves: No cranial nerve deficit.      Sensory: No sensory deficit.   Psychiatric:         Mood and Affect: Mood normal.         Behavior: Behavior normal.      Comments: Cooperative with me         Fluids    Intake/Output Summary (Last 24 hours) at 12/14/2019 1110  Last data filed at 12/14/2019 1100  Gross per 24 hour   Intake 100 ml   Output 1050 ml   Net -950 ml       Laboratory                        Imaging  DX-FEMUR-2+ LEFT   Final Result      Proximal left femur fracture status post ORIF in anatomic alignment.      DX-FEMUR-2+ LEFT   Final Result      Intraoperative evaluation of femur fracture fixation.      DX-PORTABLE FLUORO > 1 HOUR   Final Result      Intraoperative evaluation of femur fracture fixation.      DX-CHEST-PORTABLE (1 VIEW)   Final  Result      No acute cardiopulmonary abnormality. No displaced rib fractures. No pneumothorax.      DX-HIP-COMPLETE - UNILATERAL 2+ LEFT   Final Result      1.  Mild/moderately displaced subtrochanteric proximal left femur fracture.   2.  Degenerative changes of the hips.      DX-FEMUR-1 VIEW LEFT   Final Result      Acute, obliquely oriented fracture of subtrochanteric femoral diaphysis.           Assessment/Plan  * Femur fracture (HCC)  Assessment & Plan  Left sided subtrochanteric femur fracture   Cont with pain control   Pt/ot eval as appropriate  SNF to [place]   Family willing to provide transportation   Ok to provide pain medications if pt is in significant pain   12/5- primary care in [place] will try to arrange SNF . Continue PT/OT whenever possible while inpatient     12/10 encourage activity, pain control  Appears comfortable  To [facility] with [facility], patient states his friend Erik can help with transport    12/11 requiring 1 person + assist for transfers  Plan for transfer to [place], when accepted    12/12 staples removed, PT OT with min max assist  Pending transfer to skilled nursing facility in [place]  We will need to coordinate with family regarding transfer   assisting    12/14 improving activity tolerance  Family to assist, running to pick patient up if able to provide appropriate level of assistance at home  Versus transfer to skilled nursing facility [facility]      Agitation  Assessment & Plan  12/12 intermittent agitation with aggression  Memory loss  We will start trial of Seroquel, limit for sedation    12/14 behavior appropriate  Continue Seroquel    Urinary retention  Assessment & Plan  On shah.       12/14 attempt catheter removal, bladder scan protocol  No signs of UTI      Acute blood loss anemia- (present on admission)  Assessment & Plan  Hemoglobin 10.1-11  Baseline hemoglobin 14  Related to surgery  Follow CBC closely  Transfuse as needed with target  hemoglobin above 7  12/5-remain stable. Slight improved   12/6- remain stable     Marijuana use, continuous- (present on admission)  Assessment & Plan  Daily use     BPH (benign prostatic hyperplasia)  Assessment & Plan  Continue tamsulosin and finasteride   Continue shah   He will need to follow up with urology in [place]     Prediabetes  Assessment & Plan  Pt has no diabetes. He has prediabetes. a1c 5.7   He is 78 y/o. no need for metformin which has more side effects instead of benefits   Discontinue accu-check  Diet controled       Essential hypertension  Assessment & Plan  Unknown home medications at this time \  BP stable  Prn hydralazine ordered   Doing well without med.   Continue to monitor        VTE prophylaxis: lovenox

## 2019-12-14 NOTE — PROGRESS NOTES
"   Orthopaedic PA Progress Note    Interval changes:Wants to go home. FWW ordered    ROS - Patient denies any new issues. No chest pain, dyspnea, or fever.  Pain well controlled.    /80   Pulse 89   Temp 36.9 °C (98.4 °F) (Temporal)   Resp 15   Ht 1.702 m (5' 7.01\")   Wt 76.2 kg (167 lb 15.9 oz)   SpO2 92%     Patient seen and examined  No acute distress  Breathing non labored  RRR  Surgical dressing is clean, dry, and intact. Patient clearly fires tibialis anterior, EHL, and gastrocnemius/soleus. Sensation is intact to light touch throughout superficial peroneal, deep peroneal, tibial, saphenous, and sural nerve distributions. Strong and palpable 2+ dorsalis pedis and posterior tibial pulses with capillary refill less than 2 seconds. No lower leg tenderness or discomfort.    Active Hospital Problems    Diagnosis   • Agitation [R45.1]   • Urinary retention [R33.9]   • Acute blood loss anemia [D62]   • Femur fracture (HCC) [S72.90XA]   • Essential hypertension [I10]   • Prediabetes [R73.03]   • BPH (benign prostatic hyperplasia) [N40.0]   • Marijuana use, continuous [F12.90]     Assessment/Plan:  Cleared for DC by ortho pending medicine clearance  POD#16 S/P Intramedullary nailing of left femur.   Wt bearing status - WBAT  Wound care/Drains - dressings changed every other day by nursing  Future Procedures - none   Lovenox: Start 11/29, Duration-until ambulatory > 150'  All staples out LLE  PT/OT-initiated  Antibiotics: completed  DVT Prophylaxis- TEDS/SCDs/Foot pumps  Garibay-none  Case Coordination for Discharge Planning - Disposition per Med/PT/OT, likely [place]: will need to follow up with Orthopaedic Trauma locally with XRs in 1 week.  "

## 2019-12-14 NOTE — PROGRESS NOTES
DATE OF SERVICE:    TIME:  Approximately 10:30 a.m.    SUBJECTIVE:  The patient is sleeping.  He did wake up when I came in the room,   however.  His staples were removed yesterday.  He has no complaints.  He is   able to dorsiflex and plantarflex his toes.    PHYSICAL EXAMINATION:  Blood pressure 129/80, heart rate 97, respirations 19,   temperature 98.1.    ASSESSMENT:  Left subtrochanteric femur fracture -- status post intramedullary   nailing.    PLAN:  Continue mobilization with therapy and walker.  Weightbearing as   tolerated on the left lower extremity.  DVT prophylaxis.  Discharge planning.    Repeat radiographs in approximately 1 month.       ____________________________________     MD POPPY KELLY / FEDER[place]    DD:  12/13/2019 16:00:42  DT:  12/13/2019 22:07:42    D#:  [id number]  Job#:  [id number]

## 2019-12-15 PROCEDURE — A9270 NON-COVERED ITEM OR SERVICE: HCPCS | Performed by: PHYSICIAN ASSISTANT

## 2019-12-15 PROCEDURE — 700102 HCHG RX REV CODE 250 W/ 637 OVERRIDE(OP): Performed by: PHYSICIAN ASSISTANT

## 2019-12-15 PROCEDURE — A9270 NON-COVERED ITEM OR SERVICE: HCPCS | Performed by: INTERNAL MEDICINE

## 2019-12-15 PROCEDURE — 700101 HCHG RX REV CODE 250: Performed by: INTERNAL MEDICINE

## 2019-12-15 PROCEDURE — 700102 HCHG RX REV CODE 250 W/ 637 OVERRIDE(OP): Performed by: HOSPITALIST

## 2019-12-15 PROCEDURE — 700102 HCHG RX REV CODE 250 W/ 637 OVERRIDE(OP): Performed by: INTERNAL MEDICINE

## 2019-12-15 PROCEDURE — 770006 HCHG ROOM/CARE - MED/SURG/GYN SEMI*

## 2019-12-15 PROCEDURE — 51798 US URINE CAPACITY MEASURE: CPT

## 2019-12-15 PROCEDURE — 700111 HCHG RX REV CODE 636 W/ 250 OVERRIDE (IP): Performed by: ORTHOPAEDIC SURGERY

## 2019-12-15 PROCEDURE — 306015 LOCK STAT FOLEY: Performed by: ORTHOPAEDIC SURGERY

## 2019-12-15 PROCEDURE — 99232 SBSQ HOSP IP/OBS MODERATE 35: CPT | Performed by: INTERNAL MEDICINE

## 2019-12-15 PROCEDURE — A9270 NON-COVERED ITEM OR SERVICE: HCPCS | Performed by: HOSPITALIST

## 2019-12-15 RX ORDER — IBUPROFEN 400 MG/1
400 TABLET ORAL EVERY 6 HOURS PRN
Status: DISCONTINUED | OUTPATIENT
Start: 2019-12-15 | End: 2019-12-20 | Stop reason: HOSPADM

## 2019-12-15 RX ADMIN — SENNOSIDES AND DOCUSATE SODIUM 2 TABLET: 8.6; 5 TABLET ORAL at 04:55

## 2019-12-15 RX ADMIN — CYCLOBENZAPRINE 10 MG: 10 TABLET, FILM COATED ORAL at 12:55

## 2019-12-15 RX ADMIN — OXYCODONE HYDROCHLORIDE 5 MG: 5 TABLET ORAL at 04:55

## 2019-12-15 RX ADMIN — NICOTINE TRANSDERMAL SYSTEM 21 MG: 21 PATCH, EXTENDED RELEASE TRANSDERMAL at 04:56

## 2019-12-15 RX ADMIN — OXYCODONE HYDROCHLORIDE 5 MG: 5 TABLET ORAL at 00:10

## 2019-12-15 RX ADMIN — ZOLPIDEM TARTRATE 5 MG: 5 TABLET ORAL at 02:24

## 2019-12-15 RX ADMIN — ENOXAPARIN SODIUM 40 MG: 100 INJECTION SUBCUTANEOUS at 04:54

## 2019-12-15 RX ADMIN — SENNOSIDES AND DOCUSATE SODIUM 2 TABLET: 8.6; 5 TABLET ORAL at 18:07

## 2019-12-15 RX ADMIN — LIDOCAINE 1 PATCH: 50 PATCH TOPICAL at 18:06

## 2019-12-15 RX ADMIN — QUETIAPINE FUMARATE 25 MG: 25 TABLET ORAL at 04:56

## 2019-12-15 RX ADMIN — FINASTERIDE 5 MG: 5 TABLET, FILM COATED ORAL at 04:56

## 2019-12-15 RX ADMIN — TAMSULOSIN HYDROCHLORIDE 0.4 MG: 0.4 CAPSULE ORAL at 09:37

## 2019-12-15 RX ADMIN — QUETIAPINE FUMARATE 25 MG: 25 TABLET ORAL at 18:07

## 2019-12-15 RX ADMIN — OXYCODONE HYDROCHLORIDE 5 MG: 5 TABLET ORAL at 12:55

## 2019-12-15 ASSESSMENT — ENCOUNTER SYMPTOMS
DIZZINESS: 0
ABDOMINAL PAIN: 0
CHILLS: 0
NAUSEA: 0
SPUTUM PRODUCTION: 0
HEADACHES: 0
PALPITATIONS: 0
MYALGIAS: 1
MEMORY LOSS: 1
DEPRESSION: 0
FEVER: 0

## 2019-12-15 NOTE — PROGRESS NOTES
Hospital Medicine Daily Progress Note    Date of Service  12/15/2019    Chief Complaint  79 y.o. male admitted 11/27/2019 with ground level fall, hip pain     Hospital Course    79-year-old male past medical history of hypertension, diabetes presenting with ground-level fall, left hip pain.  He was getting out of his car when he slipped on ice and landed on his left hip.  He was unable to bear weight on his left side with significant amount of pain.  Please refer to H&P with Dr. Pacheco for further details. Xr showed Mild/moderately displaced subtrochanteric proximal left femur fracture. He was taken to OR 11/28, by Dr. Barboza and he did have Intramedullary nailing of left femur. He was being more confused 12/2. Narcotic were held. Vitals stable. Lab work unremarkable. 12/4 noted LADARIUS. IF fluid started. Mentation improved.         Interval Problem Update  Spoke with his son. His son tells me that this is baseline. His father has been in [facility] for long time. He smokes marijuana a lot. He is not cooperative most of the time. Family is trying to get him close to [place]. No neuro deficits. Electrolytes wnl. No signs of infection    12/4- mentation improved. Pt doing better. IV fluid due to LADARIUS,. Hoping transferring to SNF to [place]. Family willing to provide transportation. No urinary retention. Pt tells me that he is feeling better, eating better. Right eye conjunctivitis. Erythromycin for 2 days    12/5- some urinary retention. Pt was not taking flomax 2 weeks before admission. Worse last night from IVF. GFR normal. Pt medical cleared for transfer.   12/6- feeling better, after shah was placed. No complains. Pending placement.   12/7- feeling better after shah placed. He has no concerns. No event overnight. Pending placement   12/8- did not sleep last night, because roommate was screaming. No other complains. Asking to be transfer to [place]. He tells me that his son in [place], will help him. Pt has his own place.  He tells me that his daughter will help him. Still pending transfer. Medical stable.   12/9- no event. He would like to go home. I did reassure that we are working on transferring back to [place]. I did discuss with  to see if home health in [place] is an option discharge. He is medical stable. XR tomorrow per ortho.   12/10-easily arousable, reports improved pain control  Decreased range of motion secondary to pain  States that Erik, his friend can transfer him back home  Pending skilled nursing facility acceptance in [place]    12/11 sitting up in chair, per patient requiring 1 person assist with transfers  Reports minimal lower extremity discomfort, worse with movement  Forgetful, continues to ask to discharge back to [place], reports that family including his 2 daughters will assist with care  Reviewed plan of care regarding placement    12/12 agitation intermittently, pleasant on exam  Reports improved pain control    12/14 patient standby assist with ambulation  Improved activity tolerance  Forgetful, catheter still in place  Plan of care reviewed with patient and RN    12/15 resting, easily arousable  No new events    Consultants/Specialty  Ortho     Code Status  Full code     Disposition  Inpatient     Needs skilled nursing facility placement in [place],  assisting    Review of Systems  Review of Systems   Constitutional: Negative for chills and fever.   Respiratory: Negative for sputum production.    Cardiovascular: Negative for chest pain, palpitations and leg swelling.   Gastrointestinal: Negative for abdominal pain and nausea.   Genitourinary: Negative for dysuria and hematuria.        Trouble voiding    Musculoskeletal: Positive for myalgias. Negative for joint pain.   Neurological: Negative for dizziness and headaches.   Psychiatric/Behavioral: Positive for memory loss. Negative for depression.        Physical Exam  Temp:  [36.2 °C (97.2 °F)-37.4 °C (99.3 °F)] 37.1 °C (98.7  °F)  Pulse:  [] 98  Resp:  [16-20] 20  BP: ()/(61-85) 124/85  SpO2:  [91 %-100 %] 91 %    Physical Exam  Vitals signs and nursing note reviewed.   Constitutional:       General: He is not in acute distress.     Appearance: He is not ill-appearing.   HENT:      Head: Normocephalic and atraumatic.      Right Ear: External ear normal.      Left Ear: External ear normal.   Eyes:      Extraocular Movements: Extraocular movements intact.      Pupils: Pupils are equal, round, and reactive to light.   Neck:      Musculoskeletal: Normal range of motion. No neck rigidity.   Cardiovascular:      Rate and Rhythm: Normal rate.      Heart sounds: No murmur.   Pulmonary:      Effort: Pulmonary effort is normal. No respiratory distress.   Abdominal:      General: Bowel sounds are normal.      Palpations: Abdomen is soft. There is no mass.   Musculoskeletal:         General: No swelling or tenderness.      Comments: Left leg slight swelling and tenderness    Neurological:      General: No focal deficit present.      Mental Status: He is alert.      Cranial Nerves: No cranial nerve deficit.   Psychiatric:         Mood and Affect: Mood normal.         Behavior: Behavior normal.      Comments: Cooperative with me         Fluids    Intake/Output Summary (Last 24 hours) at 12/15/2019 1214  Last data filed at 12/14/2019 2325  Gross per 24 hour   Intake 360 ml   Output 20 ml   Net 340 ml       Laboratory                        Imaging  DX-FEMUR-2+ LEFT   Final Result      Proximal left femur fracture status post ORIF in anatomic alignment.      DX-FEMUR-2+ LEFT   Final Result      Intraoperative evaluation of femur fracture fixation.      DX-PORTABLE FLUORO > 1 HOUR   Final Result      Intraoperative evaluation of femur fracture fixation.      DX-CHEST-PORTABLE (1 VIEW)   Final Result      No acute cardiopulmonary abnormality. No displaced rib fractures. No pneumothorax.      DX-HIP-COMPLETE - UNILATERAL 2+ LEFT   Final Result       1.  Mild/moderately displaced subtrochanteric proximal left femur fracture.   2.  Degenerative changes of the hips.      DX-FEMUR-1 VIEW LEFT   Final Result      Acute, obliquely oriented fracture of subtrochanteric femoral diaphysis.           Assessment/Plan  * Femur fracture (HCC)  Assessment & Plan  Left sided subtrochanteric femur fracture   Cont with pain control   Pt/ot eval as appropriate  SNF to [place]   Family willing to provide transportation   Ok to provide pain medications if pt is in significant pain   12/5- primary care in [place] will try to arrange SNF . Continue PT/OT whenever possible while inpatient     12/10 encourage activity, pain control  Appears comfortable  [facility], patient states his friend Erik can help with transport    12/11 requiring 1 person + assist for transfers  Plan for transfer to [place], when accepted    12/12 staples removed, PT OT with min max assist  Pending transfer to skilled nursing facility in [place]  We will need to coordinate with family regarding transfer   assisting    12/14 improving activity tolerance  Family to assist, running to pick patient up if able to provide appropriate level of assistance at home  Versus transfer to skilled nursing facility in [place]    12/15 intermittent pain, postop day #17  Encourage activity  Family to be educated regarding therapy needs and assistance  Needs 24/7 supervision  On oxycodone  Add Motrin as needed  Has lidocaine patch      Agitation  Assessment & Plan  12/12 intermittent agitation with aggression  Memory loss  We will start trial of Seroquel, limit for sedation    12/14 behavior appropriate  Continue Seroquel    Urinary retention  Assessment & Plan  On shah.       12/14 attempt catheter removal, bladder scan protocol  No signs of UTI      12/15 retention of over 500 mL's this a.m., straight cath  If recurrent, will replace Shah catheter  Encourage  urination  Continue Proscar and Flomax  History of retention, follows up with urologist in [place]  May need discharge with catheter in place    Acute blood loss anemia- (present on admission)  Assessment & Plan  Hemoglobin 10.1-11  Baseline hemoglobin 14  Related to surgery  Follow CBC closely  Transfuse as needed with target hemoglobin above 7  12/5-remain stable. Slight improved   12/6- remain stable     Marijuana use, continuous- (present on admission)  Assessment & Plan  Daily use     BPH (benign prostatic hyperplasia)  Assessment & Plan  Continue tamsulosin and finasteride   Continue shah   He will need to follow up with urology in [place]     Prediabetes  Assessment & Plan  Pt has no diabetes. He has prediabetes. a1c 5.7   He is 80 y/o. no need for metformin which has more side effects instead of benefits   Discontinue accu-check  Diet controled       Essential hypertension  Assessment & Plan  Unknown home medications at this time \  BP stable  Prn hydralazine ordered   Doing well without med.   Continue to monitor        VTE prophylaxis: lovenox

## 2019-12-15 NOTE — CARE PLAN
Problem: Safety  Goal: Will remain free from injury  Outcome: PROGRESSING AS EXPECTED  Intervention: Provide assistance with mobility  Flowsheets (Taken 12/14/2019 1000 by Val Cunningham RJEAN CARLOS)  Assistance: Assistance of One  Ambulation Tolerance: Tolerates Well  Note:   Pt bed alarm on, bed in lowest position and locked, call light within reach, pt uses call light for assistance     Problem: Skin Integrity  Goal: Risk for impaired skin integrity will decrease  Outcome: PROGRESSING AS EXPECTED  Intervention: Assess risk factors for impaired skin integrity and/or pressure ulcers  Note:   Pt skin monitor and assessed, pt repositioned

## 2019-12-15 NOTE — PROGRESS NOTES
Pt had asked for a sleep aid earlier this shift, when RN entered the room pt was sleeping.  Pt woke up complaining of pain, prn pain medication given.  Pt continues to complain of pain and not able to sleep, at this time nothing is available.  Ambien given after speaking with charge nurse due to the timing.

## 2019-12-16 PROCEDURE — 700102 HCHG RX REV CODE 250 W/ 637 OVERRIDE(OP): Performed by: PHYSICIAN ASSISTANT

## 2019-12-16 PROCEDURE — A9270 NON-COVERED ITEM OR SERVICE: HCPCS | Performed by: INTERNAL MEDICINE

## 2019-12-16 PROCEDURE — 99232 SBSQ HOSP IP/OBS MODERATE 35: CPT | Performed by: INTERNAL MEDICINE

## 2019-12-16 PROCEDURE — 770006 HCHG ROOM/CARE - MED/SURG/GYN SEMI*

## 2019-12-16 PROCEDURE — A9270 NON-COVERED ITEM OR SERVICE: HCPCS | Performed by: HOSPITALIST

## 2019-12-16 PROCEDURE — 700102 HCHG RX REV CODE 250 W/ 637 OVERRIDE(OP): Performed by: HOSPITALIST

## 2019-12-16 PROCEDURE — 700101 HCHG RX REV CODE 250: Performed by: INTERNAL MEDICINE

## 2019-12-16 PROCEDURE — 97530 THERAPEUTIC ACTIVITIES: CPT

## 2019-12-16 PROCEDURE — 700102 HCHG RX REV CODE 250 W/ 637 OVERRIDE(OP): Performed by: INTERNAL MEDICINE

## 2019-12-16 PROCEDURE — A9270 NON-COVERED ITEM OR SERVICE: HCPCS | Performed by: PHYSICIAN ASSISTANT

## 2019-12-16 PROCEDURE — 92526 ORAL FUNCTION THERAPY: CPT

## 2019-12-16 PROCEDURE — 97535 SELF CARE MNGMENT TRAINING: CPT

## 2019-12-16 PROCEDURE — 700111 HCHG RX REV CODE 636 W/ 250 OVERRIDE (IP): Performed by: ORTHOPAEDIC SURGERY

## 2019-12-16 RX ADMIN — CYCLOBENZAPRINE 10 MG: 10 TABLET, FILM COATED ORAL at 11:47

## 2019-12-16 RX ADMIN — QUETIAPINE FUMARATE 25 MG: 25 TABLET ORAL at 17:10

## 2019-12-16 RX ADMIN — SENNOSIDES AND DOCUSATE SODIUM 2 TABLET: 8.6; 5 TABLET ORAL at 17:10

## 2019-12-16 RX ADMIN — NICOTINE TRANSDERMAL SYSTEM 21 MG: 21 PATCH, EXTENDED RELEASE TRANSDERMAL at 06:09

## 2019-12-16 RX ADMIN — QUETIAPINE FUMARATE 25 MG: 25 TABLET ORAL at 06:09

## 2019-12-16 RX ADMIN — LIDOCAINE 1 PATCH: 50 PATCH TOPICAL at 17:09

## 2019-12-16 RX ADMIN — FINASTERIDE 5 MG: 5 TABLET, FILM COATED ORAL at 06:09

## 2019-12-16 RX ADMIN — TAMSULOSIN HYDROCHLORIDE 0.4 MG: 0.4 CAPSULE ORAL at 10:20

## 2019-12-16 RX ADMIN — ENOXAPARIN SODIUM 40 MG: 100 INJECTION SUBCUTANEOUS at 06:10

## 2019-12-16 ASSESSMENT — ENCOUNTER SYMPTOMS
ABDOMINAL PAIN: 0
SENSORY CHANGE: 0
HEADACHES: 0
MEMORY LOSS: 1
PALPITATIONS: 0
DIAPHORESIS: 0
FEVER: 0
HEARTBURN: 0
SPUTUM PRODUCTION: 0
MYALGIAS: 1
FOCAL WEAKNESS: 0

## 2019-12-16 ASSESSMENT — COGNITIVE AND FUNCTIONAL STATUS - GENERAL
HELP NEEDED FOR BATHING: A LITTLE
TOILETING: A LITTLE
DAILY ACTIVITIY SCORE: 21
DRESSING REGULAR LOWER BODY CLOTHING: A LITTLE
SUGGESTED CMS G CODE MODIFIER DAILY ACTIVITY: CJ

## 2019-12-16 NOTE — CARE PLAN
Problem: Pain Management  Goal: Pain level will decrease to patient's comfort goal  Outcome: PROGRESSING AS EXPECTED   Patient resting comfortably at this time. Will continue to monitor and intervene as needed.    Problem: Urinary Elimination:  Goal: Ability to reestablish a normal urinary elimination pattern will improve  Outcome: PROGRESSING SLOWER THAN EXPECTED   Garibay catheter inserted during day shift for urinary retention.

## 2019-12-16 NOTE — THERAPY
"Occupational Therapy Treatment completed with focus on ADLs, ADL transfers and patient education.  Functional Status: Supine > EOB min A, transfers with FWW supv, LB dressing supv with AE  Plan of Care: Will benefit from Occupational Therapy 3 times per week  Discharge Recommendations:  Equipment Will Continue to Assess for Equipment Needs. Post-acute therapy: Recommend post-acute placement for additional occupational therapy services prior to discharge home. Patient can tolerate post-acute therapies at a 5x/week frequency.    See \"Rehab Therapy-Acute\" Patient Summary Report for complete documentation.     Pt seen for OT tx to include: bed mobility, transfer training, LB dressing. Declined mobilization to bathroom for further ADL. Pt demonstrates improved functional mobility and good use of AE for LB dressing. Unclear how much support pt has in [place]. Pt will continue to benefit from acute and post-acute transitional care to progress basic ADL and I-ADL as pt lives alone. Will follow.   "

## 2019-12-16 NOTE — PROGRESS NOTES
Hospital Medicine Daily Progress Note    Date of Service  12/16/2019    Chief Complaint  79 y.o. male admitted 11/27/2019 with ground level fall, hip pain     Hospital Course    79-year-old male past medical history of hypertension, diabetes presenting with ground-level fall, left hip pain.  He was getting out of his car when he slipped on ice and landed on his left hip.  He was unable to bear weight on his left side with significant amount of pain.  Please refer to H&P with Dr. Pacheco for further details. Xr showed Mild/moderately displaced subtrochanteric proximal left femur fracture. He was taken to OR 11/28, by Dr. Barboza and he did have Intramedullary nailing of left femur. He was being more confused 12/2. Narcotic were held. Vitals stable. Lab work unremarkable. 12/4 noted LADARIUS. IF fluid started. Mentation improved.         Interval Problem Update  Spoke with his son. His son tells me that this is baseline. His father has been in nursing home for long time. He smokes marijuana a lot. He is not cooperative most of the time. Family is trying to get him close to [place]. No neuro deficits. Electrolytes wnl. No signs of infection    12/4- mentation improved. Pt doing better. IV fluid due to LADARIUS,. Hoping transferring to SNF to [place]. Family willing to provide transportation. No urinary retention. Pt tells me that he is feeling better, eating better. Right eye conjunctivitis. Erythromycin for 2 days    12/5- some urinary retention. Pt was not taking flomax 2 weeks before admission. Worse last night from IVF. GFR normal. Pt medical cleared for transfer.   12/6- feeling better, after shah was placed. No complains. Pending placement.   12/7- feeling better after shah placed. He has no concerns. No event overnight. Pending placement   12/8- did not sleep last night, because roommate was screaming. No other complains. Asking to be transfer to [place]. He tells me that his son in [place], will help him. Pt has his own place.  He tells me that his daughter will help him. Still pending transfer. Medical stable.   12/9- no event. He would like to go home. I did reassure that we are working on transferring back to [place]. I did discuss with  to see if home health in [place] is an option discharge. He is medical stable. XR tomorrow per ortho.   12/10-easily arousable, reports improved pain control  Decreased range of motion secondary to pain  States that Erik, his friend can transfer him back home  Pending skilled nursing facility acceptance in [place]    12/11 sitting up in chair, per patient requiring 1 person assist with transfers  Reports minimal lower extremity discomfort, worse with movement  Forgetful, continues to ask to discharge back to [place], reports that family including his 2 daughters will assist with care  Reviewed plan of care regarding placement    12/12 agitation intermittently, pleasant on exam  Reports improved pain control    12/14 patient standby assist with ambulation  Improved activity tolerance  Forgetful, catheter still in place  Plan of care reviewed with patient and RN    12/15 resting, easily arousable  No new events    12/16 patient sitting up in chair, improved muscle strength  Awaiting transport by family  Per patient's son, plans to travel from [place] in the next 1 to 2 days to  patient  They were advised that he will need 24/7 support and assistance  Consultants/Specialty  Ortho     Code Status  Full code     Disposition  Inpatient   Home with 24/7 support by family versus skilled nursing facility placement in [place],  assisting    Review of Systems  Review of Systems   Constitutional: Negative for diaphoresis, fever and malaise/fatigue.   Respiratory: Negative for sputum production.    Cardiovascular: Negative for palpitations and leg swelling.   Gastrointestinal: Negative for abdominal pain and heartburn.   Genitourinary: Negative for hematuria.        Trouble voiding     Musculoskeletal: Positive for myalgias. Negative for joint pain.   Neurological: Negative for sensory change, focal weakness and headaches.   Psychiatric/Behavioral: Positive for memory loss.        Physical Exam  Temp:  [36.3 °C (97.3 °F)-37.2 °C (99 °F)] 36.3 °C (97.3 °F)  Pulse:  [] 105  Resp:  [16-20] 16  BP: (116-135)/(80-88) 116/80  SpO2:  [92 %-93 %] 93 %    Physical Exam  Vitals signs and nursing note reviewed.   Constitutional:       General: He is not in acute distress.     Appearance: He is not ill-appearing or diaphoretic.   HENT:      Head: Normocephalic and atraumatic.      Right Ear: External ear normal.      Left Ear: External ear normal.   Eyes:      Pupils: Pupils are equal, round, and reactive to light.   Neck:      Musculoskeletal: Normal range of motion. No muscular tenderness.   Cardiovascular:      Rate and Rhythm: Normal rate and regular rhythm.      Heart sounds: No murmur.   Pulmonary:      Effort: Pulmonary effort is normal. No respiratory distress.   Abdominal:      General: Bowel sounds are normal. There is no distension.      Palpations: Abdomen is soft. There is no mass.   Musculoskeletal:         General: No swelling or tenderness.      Comments: Left leg slight swelling and tenderness    Neurological:      General: No focal deficit present.      Mental Status: He is alert.      Cranial Nerves: No cranial nerve deficit.      Sensory: No sensory deficit.   Psychiatric:         Mood and Affect: Mood normal.         Behavior: Behavior normal.      Comments: Cooperative with me         Fluids    Intake/Output Summary (Last 24 hours) at 12/16/2019 4923  Last data filed at 12/16/2019 1300  Gross per 24 hour   Intake 1560 ml   Output 1800 ml   Net -240 ml       Laboratory                        Imaging  DX-FEMUR-2+ LEFT   Final Result      Proximal left femur fracture status post ORIF in anatomic alignment.      DX-FEMUR-2+ LEFT   Final Result      Intraoperative evaluation of femur  fracture fixation.      DX-PORTABLE FLUORO > 1 HOUR   Final Result      Intraoperative evaluation of femur fracture fixation.      DX-CHEST-PORTABLE (1 VIEW)   Final Result      No acute cardiopulmonary abnormality. No displaced rib fractures. No pneumothorax.      DX-HIP-COMPLETE - UNILATERAL 2+ LEFT   Final Result      1.  Mild/moderately displaced subtrochanteric proximal left femur fracture.   2.  Degenerative changes of the hips.      DX-FEMUR-1 VIEW LEFT   Final Result      Acute, obliquely oriented fracture of subtrochanteric femoral diaphysis.           Assessment/Plan  * Femur fracture (HCC)  Assessment & Plan  Left sided subtrochanteric femur fracture   Cont with pain control   Pt/ot eval as appropriate  SNF to [place]   Family willing to provide transportation   Ok to provide pain medications if pt is in significant pain   12/5- primary care in [place] will try to arrange SNF . Continue PT/OT whenever possible while inpatient     12/10 encourage activity, pain control  Appears comfortable  [facility], patient states his friend Erik can help with transport    12/11 requiring 1 person + assist for transfers  Plan for transfer to [place], when accepted    12/12 staples removed, PT OT with min max assist  Pending transfer to skilled nursing facility in [place]  We will need to coordinate with family regarding transfer   assisting    12/14 improving activity tolerance  Family to assist, running to pick patient up if able to provide appropriate level of assistance at home  Versus transfer to skilled nursing facility in [place]    12/15 intermittent pain, postop day #17  Encourage activity  Family to be educated regarding therapy needs and assistance  Needs 24/7 supervision  On oxycodone  Add Motrin as needed  Has lidocaine patch    12/16 postop day #18  Pain controlled  Family planning to come to  patient, therapy to educate family regarding assistance  needs   assisting either discharged home with family and 24/7 supervision or transfer to skilled nursing facility in [place] when bed available      Agitation  Assessment & Plan  12/12 intermittent agitation with aggression  Memory loss  We will start trial of Seroquel, limit for sedation    12/14 behavior appropriate  Continue Seroquel    Urinary retention  Assessment & Plan  On shah.       12/14 attempt catheter removal, bladder scan protocol  No signs of UTI      12/15 retention of over 500 mL's this a.m., straight cath  If recurrent, will replace Shah catheter  Encourage urination  Continue Proscar and Flomax  History of retention, follows up with urologist in [place]  May need discharge with catheter in place    12/16 recurrent retention, Shah catheter replaced  Continue Proscar and Flomax    Acute blood loss anemia- (present on admission)  Assessment & Plan  Hemoglobin 10.1-11  Baseline hemoglobin 14  Related to surgery  Follow CBC closely  Transfuse as needed with target hemoglobin above 7  12/5-remain stable. Slight improved   12/6- remain stable     Marijuana use, continuous- (present on admission)  Assessment & Plan  Daily use     BPH (benign prostatic hyperplasia)  Assessment & Plan  Continue tamsulosin and finasteride   Continue shah   He will need to follow up with urology in [place]     Prediabetes  Assessment & Plan  Pt has no diabetes. He has prediabetes. a1c 5.7   He is 78 y/o. no need for metformin which has more side effects instead of benefits   Discontinue accu-check  Diet controled       Essential hypertension  Assessment & Plan  Unknown home medications at this time \  BP stable  Prn hydralazine ordered   Doing well without med.   Continue to monitor        VTE prophylaxis: lovenox

## 2019-12-16 NOTE — DISCHARGE PLANNING
Spoke with the pts son Sim #[phone]. Sim states that he wants to  his dad in the next day or two.  Sim informed that for a safe discharge the pt will need someone with him 24/7. Sim stated that he will stay with his dad or have his dad stay with him. Sim also informed of the pts need for home health and follow up. Sim states that he will be setting up home health and a 's appointment for his did as soon as they get back to [place].  Dr. Barr updated.

## 2019-12-16 NOTE — DIETARY
"Nutrition Services: Weekly Re-Screen Poor PO  Day 19 of admit.  Nazario Cox is a 79 y.o. male with admitting DX of Femur fracture     Pt is currently on diabetic, dysphagia 2 diet with nectar thick liquids. Pt receiving yogurt and smoothie as snacks. Pt is not happy with current diet order. He isn't liking the \"baby food\". Pt okay with current snacks. Per chart pt PO refused to 75%. Wt 12/15: 73 kg via bed scale - wt slight decreased since wt on 11/27. Could be related to fluids (per I/Os pt -9.2 L) and/or items on bed when weighed.     Malnutrition Risk: No criteria noted at this time.     Recommendations/Plan:  1. Diet upgrades per SLP.   2. Encourage intake of meals  3. Document intake of all meals as % taken in ADL's to provide interdisciplinary communication across all shifts.   4. Monitor weight.  5. Nutrition rep will continue to see patient for ongoing meal and snack preferences.  6. Obtain supplement order per RD as needed.    RD following    "

## 2019-12-16 NOTE — CARE PLAN
Problem: Nutritional:  Goal: Achieve adequate nutritional intake  Description  Patient will consume ~50% of meals    12/16/2019 1111 by Angeles Castillo  Outcome: PROGRESSING SLOWER THAN EXPECTED

## 2019-12-17 PROCEDURE — A9270 NON-COVERED ITEM OR SERVICE: HCPCS | Performed by: INTERNAL MEDICINE

## 2019-12-17 PROCEDURE — 700102 HCHG RX REV CODE 250 W/ 637 OVERRIDE(OP): Performed by: INTERNAL MEDICINE

## 2019-12-17 PROCEDURE — 700102 HCHG RX REV CODE 250 W/ 637 OVERRIDE(OP): Performed by: PHYSICIAN ASSISTANT

## 2019-12-17 PROCEDURE — 99231 SBSQ HOSP IP/OBS SF/LOW 25: CPT | Performed by: INTERNAL MEDICINE

## 2019-12-17 PROCEDURE — 97116 GAIT TRAINING THERAPY: CPT

## 2019-12-17 PROCEDURE — 700101 HCHG RX REV CODE 250: Performed by: INTERNAL MEDICINE

## 2019-12-17 PROCEDURE — A9270 NON-COVERED ITEM OR SERVICE: HCPCS | Performed by: HOSPITALIST

## 2019-12-17 PROCEDURE — 700111 HCHG RX REV CODE 636 W/ 250 OVERRIDE (IP): Performed by: ORTHOPAEDIC SURGERY

## 2019-12-17 PROCEDURE — A9270 NON-COVERED ITEM OR SERVICE: HCPCS | Performed by: PHYSICIAN ASSISTANT

## 2019-12-17 PROCEDURE — 700102 HCHG RX REV CODE 250 W/ 637 OVERRIDE(OP): Performed by: HOSPITALIST

## 2019-12-17 PROCEDURE — 770006 HCHG ROOM/CARE - MED/SURG/GYN SEMI*

## 2019-12-17 RX ORDER — QUETIAPINE FUMARATE 25 MG/1
25 TABLET, FILM COATED ORAL EVERY EVENING
Status: DISCONTINUED | OUTPATIENT
Start: 2019-12-17 | End: 2019-12-19

## 2019-12-17 RX ORDER — SIMETHICONE 80 MG
80 TABLET,CHEWABLE ORAL 3 TIMES DAILY PRN
Status: DISCONTINUED | OUTPATIENT
Start: 2019-12-17 | End: 2019-12-20 | Stop reason: HOSPADM

## 2019-12-17 RX ORDER — TRAZODONE HYDROCHLORIDE 50 MG/1
50 TABLET ORAL
Status: DISCONTINUED | OUTPATIENT
Start: 2019-12-17 | End: 2019-12-20 | Stop reason: HOSPADM

## 2019-12-17 RX ADMIN — NICOTINE TRANSDERMAL SYSTEM 21 MG: 21 PATCH, EXTENDED RELEASE TRANSDERMAL at 08:22

## 2019-12-17 RX ADMIN — QUETIAPINE FUMARATE 25 MG: 25 TABLET ORAL at 08:21

## 2019-12-17 RX ADMIN — LIDOCAINE 1 PATCH: 50 PATCH TOPICAL at 17:10

## 2019-12-17 RX ADMIN — MAGNESIUM HYDROXIDE 30 ML: 400 SUSPENSION ORAL at 03:03

## 2019-12-17 RX ADMIN — CYCLOBENZAPRINE 10 MG: 10 TABLET, FILM COATED ORAL at 00:47

## 2019-12-17 RX ADMIN — TAMSULOSIN HYDROCHLORIDE 0.4 MG: 0.4 CAPSULE ORAL at 08:22

## 2019-12-17 RX ADMIN — FINASTERIDE 5 MG: 5 TABLET, FILM COATED ORAL at 08:22

## 2019-12-17 RX ADMIN — ENOXAPARIN SODIUM 40 MG: 100 INJECTION SUBCUTANEOUS at 08:23

## 2019-12-17 RX ADMIN — QUETIAPINE FUMARATE 25 MG: 25 TABLET ORAL at 17:10

## 2019-12-17 RX ADMIN — TRAZODONE HYDROCHLORIDE 50 MG: 50 TABLET ORAL at 20:58

## 2019-12-17 ASSESSMENT — ENCOUNTER SYMPTOMS
MEMORY LOSS: 1
HEADACHES: 0
FEVER: 0
ABDOMINAL PAIN: 0
DIAPHORESIS: 0
FOCAL WEAKNESS: 0
HEARTBURN: 0
PALPITATIONS: 0
SENSORY CHANGE: 0
SPUTUM PRODUCTION: 0
MYALGIAS: 1

## 2019-12-17 ASSESSMENT — COGNITIVE AND FUNCTIONAL STATUS - GENERAL
MOBILITY SCORE: 18
MOVING FROM LYING ON BACK TO SITTING ON SIDE OF FLAT BED: A LITTLE
TURNING FROM BACK TO SIDE WHILE IN FLAT BAD: A LITTLE
MOVING TO AND FROM BED TO CHAIR: A LITTLE
SUGGESTED CMS G CODE MODIFIER MOBILITY: CK
STANDING UP FROM CHAIR USING ARMS: A LITTLE
WALKING IN HOSPITAL ROOM: A LITTLE
CLIMB 3 TO 5 STEPS WITH RAILING: A LITTLE

## 2019-12-17 ASSESSMENT — GAIT ASSESSMENTS
ASSISTIVE DEVICE: FRONT WHEEL WALKER
GAIT LEVEL OF ASSIST: SUPERVISED
DISTANCE (FEET): 250
DEVIATION: ANTALGIC;BRADYKINETIC;SHUFFLED GAIT

## 2019-12-17 NOTE — THERAPY
"Speech Language Therapy dysphagia treatment completed.   Functional Status:  Patient upset that he is getting \"baby food\" and RN reports he is not eating much because he does not like the food. Patient sat upright but only to 80 degrees in bed until he refused to sit up further. However, he is able to sit in the chair during the day. He consumed thin liquids, mixed textures, dry solids without overt signs of aspiration. Although his dentures were not tightly fitted his mastication was effective and timely. He required verbal cueing to not talk with his mouth full and to sit upright. Recommend upgrade to dental soft, thin liquid diet. Pills whole with water. Discussed with RN who will supervise tonight's dinner to ensure safety during a complete meal.   Recommendations: Dental soft, thin liquids. Pills whole as tolerated. Supervise first meal to ensure safety   Plan of Care: Will benefit from Speech Therapy 2 times per week  Post-Acute Therapy: Anticipate that the patient will have no further speech therapy needs after discharge from the hospital.      See \"Rehab Therapy-Acute\" Patient Summary Report for complete documentation.     "

## 2019-12-17 NOTE — CARE PLAN
Problem: Safety  Goal: Will remain free from falls  Outcome: PROGRESSING AS EXPECTED  Patient calls appropriately if needing to ambulate to the bathroom with FWW.     Problem: Pain Management  Goal: Pain level will decrease to patient's comfort goal  Outcome: PROGRESSING AS EXPECTED  Patient reports no pain at beginning of shift.     Problem: Skin Integrity  Goal: Risk for impaired skin integrity will decrease  Outcome: PROGRESSING AS EXPECTED  Patient is able to turn himself into bed if any excess pressure builds up on pressure points.

## 2019-12-17 NOTE — CARE PLAN
Problem: Safety  Goal: Will remain free from falls  Outcome: PROGRESSING AS EXPECTED  Note:   Patient understands fall precautions, calls for assistance when needed for mobility.      Problem: Skin Integrity  Goal: Risk for impaired skin integrity will decrease  Outcome: PROGRESSING AS EXPECTED  Note:   Patient able to turn self in bed and ambulates to use restroom.

## 2019-12-17 NOTE — DISCHARGE PLANNING
Anticipated Discharge Disposition: Sons home in [place] for 24/7 care, PCP an [facility]    Action: Called pts son, Sim [phone], regarding picking up his father that is medically clear. No answer, left VM.    Barriers to Discharge: Transportation by pt son    Plan: F/U with Sim      Length of Stay: 20

## 2019-12-17 NOTE — PROGRESS NOTES
Paged on call hospitalist for patient complaints of excess gas and pressure causing pain. MD with new orders Simethicone for gas and mild pain.

## 2019-12-17 NOTE — PROGRESS NOTES
Hospital Medicine Daily Progress Note    Date of Service  12/17/2019    Chief Complaint  79 y.o. male admitted 11/27/2019 with ground level fall, hip pain     Hospital Course    79-year-old male past medical history of hypertension, diabetes presenting with ground-level fall, left hip pain.  He was getting out of his car when he slipped on ice and landed on his left hip.  He was unable to bear weight on his left side with significant amount of pain.  Please refer to H&P with Dr. Pacheco for further details. Xr showed Mild/moderately displaced subtrochanteric proximal left femur fracture. He was taken to OR 11/28, by Dr. Barboza and he did have Intramedullary nailing of left femur. He was being more confused 12/2. Narcotic were held. Vitals stable. Lab work unremarkable. 12/4 noted LADARIUS. IF fluid started. Mentation improved. Pt continued to have urinary retention, besides trial of discontinuing shah. LADARIUS resolved with IVF and shah placement. While inpatient PT/OT continuined to visits pt. disposition was difficult since pt lives in [facility].           Interval Problem Update  12/17- somnolent today. Pt on seroquel for intermittent agitation.  I will when of it. Only at bedtime. In the mean time, I will start trazodone and goal is to discontinue seroquel at some point. Per chart review pt has intermittent confusion which is likely due to his age and mild cognitive decline.     Consultants/Specialty  Ortho     Code Status  Full code     Disposition  Inpatient   Home with 24/7 support by family versus skilled nursing facility placement in [place],  assisting    Review of Systems  Review of Systems   Constitutional: Negative for diaphoresis, fever and malaise/fatigue.   Respiratory: Negative for sputum production.    Cardiovascular: Negative for palpitations and leg swelling.   Gastrointestinal: Negative for abdominal pain and heartburn.   Genitourinary: Negative for hematuria.        Trouble voiding     Musculoskeletal: Positive for myalgias. Negative for joint pain.   Neurological: Negative for sensory change, focal weakness and headaches.   Psychiatric/Behavioral: Positive for memory loss.        Physical Exam  Temp:  [36.2 °C (97.2 °F)-36.9 °C (98.4 °F)] 36.5 °C (97.7 °F)  Pulse:  [] 100  Resp:  [16-18] 18  BP: ()/(52-84) 118/76  SpO2:  [92 %-94 %] 94 %    Physical Exam  Vitals signs and nursing note reviewed.   Constitutional:       General: He is not in acute distress.     Appearance: He is not ill-appearing or diaphoretic.   HENT:      Head: Normocephalic and atraumatic.      Right Ear: External ear normal.      Left Ear: External ear normal.   Eyes:      Pupils: Pupils are equal, round, and reactive to light.   Neck:      Musculoskeletal: Normal range of motion. No muscular tenderness.   Cardiovascular:      Rate and Rhythm: Normal rate and regular rhythm.      Heart sounds: No murmur.   Pulmonary:      Effort: Pulmonary effort is normal. No respiratory distress.   Abdominal:      General: Bowel sounds are normal. There is no distension.      Palpations: Abdomen is soft. There is no mass.   Musculoskeletal:         General: No swelling or tenderness.      Comments: Left leg slight swelling and tenderness    Neurological:      General: No focal deficit present.      Mental Status: He is alert.      Cranial Nerves: No cranial nerve deficit.      Sensory: No sensory deficit.   Psychiatric:         Mood and Affect: Mood normal.         Behavior: Behavior normal.      Comments: Cooperative with me         Fluids    Intake/Output Summary (Last 24 hours) at 12/17/2019 1537  Last data filed at 12/17/2019 1300  Gross per 24 hour   Intake 120 ml   Output 600 ml   Net -480 ml       Laboratory                        Imaging  DX-FEMUR-2+ LEFT   Final Result      Proximal left femur fracture status post ORIF in anatomic alignment.      DX-FEMUR-2+ LEFT   Final Result      Intraoperative evaluation of femur  fracture fixation.      DX-PORTABLE FLUORO > 1 HOUR   Final Result      Intraoperative evaluation of femur fracture fixation.      DX-CHEST-PORTABLE (1 VIEW)   Final Result      No acute cardiopulmonary abnormality. No displaced rib fractures. No pneumothorax.      DX-HIP-COMPLETE - UNILATERAL 2+ LEFT   Final Result      1.  Mild/moderately displaced subtrochanteric proximal left femur fracture.   2.  Degenerative changes of the hips.      DX-FEMUR-1 VIEW LEFT   Final Result      Acute, obliquely oriented fracture of subtrochanteric femoral diaphysis.           Assessment/Plan  * Femur fracture (HCC)  Assessment & Plan  Left sided subtrochanteric femur fracture   Cont with pain control   Pt/ot eval as appropriate  SNF to [place]   Family willing to provide transportation   Ok to provide pain medications if pt is in significant pain   12/5- primary care in [place] will try to arrange SNF . Continue PT/OT whenever possible while inpatient     12/10 encourage activity, pain control  Appears comfortable  To follow-up with [facility], patient states his friend Erik can help with transport    12/11 requiring 1 person + assist for transfers  Plan for transfer to [place], when accepted    12/12 staples removed, PT OT with min max assist  Pending transfer to skilled nursing facility in [place]  We will need to coordinate with family regarding transfer   assisting    12/14 improving activity tolerance  Family to assist, running to pick patient up if able to provide appropriate level of assistance at home  Versus transfer to skilled nursing facility in [place]    12/15 intermittent pain, postop day #17  Encourage activity  Family to be educated regarding therapy needs and assistance  Needs 24/7 supervision  On oxycodone  Add Motrin as needed  Has lidocaine patch    12/16 postop day #18  Pain controlled  Family planning to come to  patient, therapy to educate family regarding assistance  needs   assisting either discharged home with family and 24/7 supervision or transfer to skilled nursing facility in [place] when bed available  12/17- pending transfer to [place]. Pain reasonable well controled. Pt/ot following       Agitation  Assessment & Plan  12/12 intermittent agitation with aggression  Memory loss  We will start trial of Seroquel, limit for sedation    12/14 behavior appropriate  Continue Seroquel    12/17- only at bedtime seroquel. To monitor with primary care or geriatric in [place].     Urinary retention  Assessment & Plan  On shah.       12/14 attempt catheter removal, bladder scan protocol  No signs of UTI      12/15 retention of over 500 mL's this a.m., straight cath  If recurrent, will replace Shah catheter  Encourage urination  Continue Proscar and Flomax  History of retention, follows up with urologist in [place]  May need discharge with catheter in place    12/16 recurrent retention, Shah catheter replaced  Continue Proscar and Flomax    Acute blood loss anemia- (present on admission)  Assessment & Plan  Hemoglobin 10.1-11  Baseline hemoglobin 14  Related to surgery  Follow CBC closely  Transfuse as needed with target hemoglobin above 7  12/5-remain stable. Slight improved   12/6- remain stable     Marijuana use, continuous- (present on admission)  Assessment & Plan  Daily use     BPH (benign prostatic hyperplasia)  Assessment & Plan  Continue tamsulosin and finasteride   Continue shah   He will need to follow up with urology in [place]     Prediabetes  Assessment & Plan  Pt has no diabetes. He has prediabetes. a1c 5.7   He is 78 y/o. no need for metformin which has more side effects instead of benefits   Discontinue accu-check  Diet controled       Essential hypertension  Assessment & Plan  Unknown home medications at this time \  BP stable  Prn hydralazine ordered   Doing well without med.   Continue to monitor        VTE prophylaxis: lovenox

## 2019-12-17 NOTE — PROGRESS NOTES
"   Orthopaedic PA Progress Note    Interval changes:C/O food texture, SLP requested    ROS - Patient denies any new issues. No chest pain, dyspnea, or fever.  Pain well controlled.    /84   Pulse 99   Temp 36.9 °C (98.4 °F) (Temporal)   Resp 18   Ht 1.702 m (5' 7.01\")   Wt 73 kg (160 lb 15 oz)   SpO2 93%     Patient seen and examined  No acute distress  Breathing non labored  RRR  Surgical dressing is clean, dry, and intact. Patient clearly fires tibialis anterior, EHL, and gastrocnemius/soleus. Sensation is intact to light touch throughout superficial peroneal, deep peroneal, tibial, saphenous, and sural nerve distributions. Strong and palpable 2+ dorsalis pedis and posterior tibial pulses with capillary refill less than 2 seconds. No lower leg tenderness or discomfort.    Active Hospital Problems    Diagnosis   • Agitation [R45.1]   • Urinary retention [R33.9]   • Acute blood loss anemia [D62]   • Femur fracture (HCC) [S72.90XA]   • Essential hypertension [I10]   • Prediabetes [R73.03]   • BPH (benign prostatic hyperplasia) [N40.0]   • Marijuana use, continuous [F12.90]     Assessment/Plan:  Cleared for DC by ortho pending medicine clearance  POD#18 S/P Intramedullary nailing of left femur.   Wt bearing status - WBAT  Wound care/Drains - dressings changed every other day by nursing  Future Procedures - none   Lovenox: Start 11/29, Duration-until ambulatory > 150'  All staples out LLE  PT/OT-initiated  Antibiotics: completed  DVT Prophylaxis- TEDS/SCDs/Foot pumps  Garibay-none  Case Coordination for Discharge Planning - Disposition per Med/PT/OT, likely [place]: will need to follow up with Orthopaedic Trauma locally with XRs in 1 week.      "

## 2019-12-17 NOTE — THERAPY
"Physical Therapy Treatment completed.   Bed Mobility:  Supine to Sit: NT, up in chair pre/post  Transfers: Sit to Stand: Supervised  Gait: Level Of Assist: Supervised with Front-Wheel Walker   Plan of Care: Will benefit from Physical Therapy 3 times per week  Discharge Recommendations: Equipment: Front-Wheel Walker. Recommend home health transitional care for continued physical therapy services once meets acute PT goals.    See \"Rehab Therapy-Acute\" Patient Summary Report for complete documentation.     Pt seen for PT treatment session. Pt initially reporting \"I can't go running up and down the halls and I can't get dressed.\" Assured pt he would not have to run, pt agreeable to participating with PT by ambulating in hallway. Pt ambulated 250ft with FWW, close SPV. No overt LOB. Good FWW management. Pt declined stair negotiation reporting in [place] he will be staying at [facility] and has elevators. Pt appears functionally capable of return home with FWW, however if pt is to return to his home (vs hospital), will need to perform stair training prior to DC. Will need to confirm DC plan with son. Will follow.   "

## 2019-12-17 NOTE — PROGRESS NOTES
Received bedside report from day RN. Patient awake in bed, call light is within reach and no other needs requested at this time.

## 2019-12-18 LAB
ALBUMIN SERPL BCP-MCNC: 3.2 G/DL (ref 3.2–4.9)
ALBUMIN/GLOB SERPL: 1.1 G/DL
ALP SERPL-CCNC: 93 U/L (ref 30–99)
ALT SERPL-CCNC: 9 U/L (ref 2–50)
ANION GAP SERPL CALC-SCNC: 10 MMOL/L (ref 0–11.9)
AST SERPL-CCNC: 10 U/L (ref 12–45)
BASOPHILS # BLD AUTO: 0.4 % (ref 0–1.8)
BASOPHILS # BLD: 0.03 K/UL (ref 0–0.12)
BILIRUB SERPL-MCNC: 0.7 MG/DL (ref 0.1–1.5)
BUN SERPL-MCNC: 25 MG/DL (ref 8–22)
CALCIUM SERPL-MCNC: 8.4 MG/DL (ref 8.5–10.5)
CHLORIDE SERPL-SCNC: 102 MMOL/L (ref 96–112)
CO2 SERPL-SCNC: 24 MMOL/L (ref 20–33)
CREAT SERPL-MCNC: 1.15 MG/DL (ref 0.5–1.4)
EOSINOPHIL # BLD AUTO: 0.21 K/UL (ref 0–0.51)
EOSINOPHIL NFR BLD: 3 % (ref 0–6.9)
ERYTHROCYTE [DISTWIDTH] IN BLOOD BY AUTOMATED COUNT: 45.1 FL (ref 35.9–50)
GLOBULIN SER CALC-MCNC: 2.8 G/DL (ref 1.9–3.5)
GLUCOSE SERPL-MCNC: 118 MG/DL (ref 65–99)
HCT VFR BLD AUTO: 38.3 % (ref 42–52)
HGB BLD-MCNC: 12.4 G/DL (ref 14–18)
IMM GRANULOCYTES # BLD AUTO: 0.02 K/UL (ref 0–0.11)
IMM GRANULOCYTES NFR BLD AUTO: 0.3 % (ref 0–0.9)
LYMPHOCYTES # BLD AUTO: 1.48 K/UL (ref 1–4.8)
LYMPHOCYTES NFR BLD: 21 % (ref 22–41)
MCH RBC QN AUTO: 30.1 PG (ref 27–33)
MCHC RBC AUTO-ENTMCNC: 32.4 G/DL (ref 33.7–35.3)
MCV RBC AUTO: 93 FL (ref 81.4–97.8)
MONOCYTES # BLD AUTO: 0.67 K/UL (ref 0–0.85)
MONOCYTES NFR BLD AUTO: 9.5 % (ref 0–13.4)
NEUTROPHILS # BLD AUTO: 4.65 K/UL (ref 1.82–7.42)
NEUTROPHILS NFR BLD: 65.8 % (ref 44–72)
NRBC # BLD AUTO: 0 K/UL
NRBC BLD-RTO: 0 /100 WBC
PLATELET # BLD AUTO: 271 K/UL (ref 164–446)
PMV BLD AUTO: 11.9 FL (ref 9–12.9)
POTASSIUM SERPL-SCNC: 4.1 MMOL/L (ref 3.6–5.5)
PROT SERPL-MCNC: 6 G/DL (ref 6–8.2)
RBC # BLD AUTO: 4.12 M/UL (ref 4.7–6.1)
SODIUM SERPL-SCNC: 136 MMOL/L (ref 135–145)
WBC # BLD AUTO: 7.1 K/UL (ref 4.8–10.8)

## 2019-12-18 PROCEDURE — 770006 HCHG ROOM/CARE - MED/SURG/GYN SEMI*

## 2019-12-18 PROCEDURE — 36415 COLL VENOUS BLD VENIPUNCTURE: CPT

## 2019-12-18 PROCEDURE — A9270 NON-COVERED ITEM OR SERVICE: HCPCS | Performed by: HOSPITALIST

## 2019-12-18 PROCEDURE — A9270 NON-COVERED ITEM OR SERVICE: HCPCS | Performed by: PHYSICIAN ASSISTANT

## 2019-12-18 PROCEDURE — 700102 HCHG RX REV CODE 250 W/ 637 OVERRIDE(OP): Performed by: PHYSICIAN ASSISTANT

## 2019-12-18 PROCEDURE — 85025 COMPLETE CBC W/AUTO DIFF WBC: CPT

## 2019-12-18 PROCEDURE — 700102 HCHG RX REV CODE 250 W/ 637 OVERRIDE(OP): Performed by: INTERNAL MEDICINE

## 2019-12-18 PROCEDURE — A9270 NON-COVERED ITEM OR SERVICE: HCPCS | Performed by: INTERNAL MEDICINE

## 2019-12-18 PROCEDURE — 99231 SBSQ HOSP IP/OBS SF/LOW 25: CPT | Performed by: INTERNAL MEDICINE

## 2019-12-18 PROCEDURE — 80053 COMPREHEN METABOLIC PANEL: CPT

## 2019-12-18 PROCEDURE — 700102 HCHG RX REV CODE 250 W/ 637 OVERRIDE(OP): Performed by: HOSPITALIST

## 2019-12-18 PROCEDURE — 700111 HCHG RX REV CODE 636 W/ 250 OVERRIDE (IP): Performed by: ORTHOPAEDIC SURGERY

## 2019-12-18 RX ORDER — TRAMADOL HYDROCHLORIDE 50 MG/1
50-100 TABLET ORAL EVERY 6 HOURS PRN
Status: DISCONTINUED | OUTPATIENT
Start: 2019-12-18 | End: 2019-12-20 | Stop reason: HOSPADM

## 2019-12-18 RX ORDER — CYCLOBENZAPRINE HCL 10 MG
5 TABLET ORAL 3 TIMES DAILY PRN
Status: DISCONTINUED | OUTPATIENT
Start: 2019-12-18 | End: 2019-12-20 | Stop reason: HOSPADM

## 2019-12-18 RX ADMIN — SENNOSIDES AND DOCUSATE SODIUM 2 TABLET: 8.6; 5 TABLET ORAL at 04:09

## 2019-12-18 RX ADMIN — ENOXAPARIN SODIUM 40 MG: 100 INJECTION SUBCUTANEOUS at 04:09

## 2019-12-18 RX ADMIN — FINASTERIDE 5 MG: 5 TABLET, FILM COATED ORAL at 04:09

## 2019-12-18 RX ADMIN — TRAZODONE HYDROCHLORIDE 50 MG: 50 TABLET ORAL at 21:07

## 2019-12-18 RX ADMIN — CYCLOBENZAPRINE HYDROCHLORIDE 5 MG: 10 TABLET, FILM COATED ORAL at 10:06

## 2019-12-18 RX ADMIN — TAMSULOSIN HYDROCHLORIDE 0.4 MG: 0.4 CAPSULE ORAL at 08:20

## 2019-12-18 RX ADMIN — SENNOSIDES AND DOCUSATE SODIUM 2 TABLET: 8.6; 5 TABLET ORAL at 16:45

## 2019-12-18 RX ADMIN — NICOTINE TRANSDERMAL SYSTEM 21 MG: 21 PATCH, EXTENDED RELEASE TRANSDERMAL at 04:09

## 2019-12-18 RX ADMIN — QUETIAPINE FUMARATE 25 MG: 25 TABLET ORAL at 16:45

## 2019-12-18 ASSESSMENT — ENCOUNTER SYMPTOMS
HEADACHES: 0
SENSORY CHANGE: 0
DIAPHORESIS: 0
MYALGIAS: 1
SPUTUM PRODUCTION: 0
HEARTBURN: 0
PALPITATIONS: 0
FEVER: 0
MEMORY LOSS: 1
FOCAL WEAKNESS: 0
ABDOMINAL PAIN: 0

## 2019-12-18 NOTE — PROGRESS NOTES
"   Orthopaedic Progress Note    Interval changes:  Patient doing well  Dressings CDI    ROS - Patient denies any new issues.  Pain well controlled.    /90   Pulse 85   Temp 36.3 °C (97.4 °F) (Temporal)   Resp 18   Ht 1.702 m (5' 7.01\")   Wt 73 kg (160 lb 15 oz)   SpO2 91%       Patient seen and examined  No acute distress  Breathing non labored  RRR  Left hip incisions without issue.  Seri strips still in place. Patient clearly fires tibialis anterior, EHL, and gastrocnemius/soleus. Sensation is intact to light touch throughout superficial peroneal, deep peroneal, tibial, saphenous, and sural nerve distributions. Strong and palpable 2+ dorsalis pedis and posterior tibial pulses with capillary refill less than 2 seconds. No lower leg tenderness or discomfort.  Recent Labs     12/18/19  0613   WBC 7.1   RBC 4.12*   HEMOGLOBIN 12.4*   HEMATOCRIT 38.3*   MCV 93.0   MCH 30.1   MCHC 32.4*   RDW 45.1   PLATELETCT 271   MPV 11.9       Active Hospital Problems    Diagnosis   • Agitation [R45.1]   • Urinary retention [R33.9]   • Acute blood loss anemia [D62]   • Femur fracture (HCC) [S72.90XA]   • Essential hypertension [I10]   • Prediabetes [R73.03]   • BPH (benign prostatic hyperplasia) [N40.0]   • Marijuana use, continuous [F12.90]       Assessment/Plan:  Cleared for DC by ortho pending medicine clearance  POD#20 S/P Intramedullary nailing of left femur.   Wt bearing status - WBAT  Wound care/Drains - dressings changed every other day by nursing  Future Procedures - none   Lovenox: Start 11/29, Duration-until ambulatory > 150'  Sutures/Staples - out   PT/OT-initiated  Antibiotics: completed  DVT Prophylaxis- TEDS/SCDs/Foot pumps  Garibay-none  Case Coordination for Discharge Planning - Disposition SNF   "

## 2019-12-18 NOTE — PROGRESS NOTES
Received report from day RN. Patient woke up from sleep and asked for a sleep/pain medication. Call light is within reach and no other needs at this time.

## 2019-12-18 NOTE — DISCHARGE PLANNING
Attempted to contact pts adult children, Sonia Montemayor, and Nazario Arriola.   No answer on any of their phones. Praneeth do not have voicemail set up. Left voice message on Nazario Arriola phone.    Sim [phone]  Sonia    [phone]  Nazario Arriola   [phone]

## 2019-12-18 NOTE — PROGRESS NOTES
Hospital Medicine Daily Progress Note    Date of Service  12/18/2019    Chief Complaint  79 y.o. male admitted 11/27/2019 with ground level fall, hip pain     Hospital Course    79-year-old male past medical history of hypertension, diabetes presenting with ground-level fall, left hip pain.  He was getting out of his car when he slipped on ice and landed on his left hip.  He was unable to bear weight on his left side with significant amount of pain.  Please refer to H&P with Dr. Pacheco for further details. Xr showed Mild/moderately displaced subtrochanteric proximal left femur fracture. He was taken to OR 11/28, by Dr. Barboza and he did have Intramedullary nailing of left femur. He was being more confused 12/2. Narcotic were held. Vitals stable. Lab work unremarkable. 12/4 noted LADARIUS. IF fluid started. Mentation improved. Pt continued to have urinary retention, besides trial of discontinuing shah. LADARIUS resolved with IVF and shah placement. While inpatient PT/OT continuined to visits pt. disposition was difficult since pt lives in [facility].           Interval Problem Update  12/17- somnolent today. Pt on seroquel for intermittent agitation.  I will when of it. Only at bedtime. In the mean time, I will start trazodone and goal is to discontinue seroquel at some point. Per chart review pt has intermittent confusion which is likely due to his age and mild cognitive decline.   12/18- feeling better today. Some muscle spasm. He tells me that he takes muscle relaxant three times a day. Order in place. Avoid narcotic, since there are times of confusion. Pending transportation from family. If family not able to  pt, next step is SNF.     Consultants/Specialty  Ortho     Code Status  Full code     Disposition  Inpatient   Home with 24/7 support by family versus skilled nursing facility placement in [place],  assisting    Review of Systems  Review of Systems   Constitutional: Negative for diaphoresis,  fever and malaise/fatigue.   Respiratory: Negative for sputum production.    Cardiovascular: Negative for palpitations and leg swelling.   Gastrointestinal: Negative for abdominal pain and heartburn.   Genitourinary: Negative for hematuria.        Trouble voiding    Musculoskeletal: Positive for myalgias. Negative for joint pain.   Neurological: Negative for sensory change, focal weakness and headaches.   Psychiatric/Behavioral: Positive for memory loss.        Physical Exam  Temp:  [36.3 °C (97.4 °F)-37.2 °C (98.9 °F)] 36.3 °C (97.4 °F)  Pulse:  [85-97] 85  Resp:  [18] 18  BP: (106-122)/(73-90) 122/90  SpO2:  [91 %-95 %] 91 %    Physical Exam  Vitals signs and nursing note reviewed.   Constitutional:       General: He is not in acute distress.     Appearance: He is not ill-appearing or diaphoretic.   HENT:      Head: Normocephalic and atraumatic.      Right Ear: External ear normal.      Left Ear: External ear normal.   Eyes:      Pupils: Pupils are equal, round, and reactive to light.   Neck:      Musculoskeletal: Normal range of motion. No muscular tenderness.   Cardiovascular:      Rate and Rhythm: Normal rate and regular rhythm.      Heart sounds: No murmur.   Pulmonary:      Effort: Pulmonary effort is normal. No respiratory distress.   Abdominal:      General: Bowel sounds are normal. There is no distension.      Palpations: Abdomen is soft. There is no mass.   Musculoskeletal:         General: No swelling or tenderness.      Comments: Left leg slight swelling and tenderness    Neurological:      General: No focal deficit present.      Mental Status: He is alert.      Cranial Nerves: No cranial nerve deficit.      Sensory: No sensory deficit.   Psychiatric:         Mood and Affect: Mood normal.         Behavior: Behavior normal.      Comments: Cooperative with me         Fluids    Intake/Output Summary (Last 24 hours) at 12/18/2019 1441  Last data filed at 12/18/2019 1323  Gross per 24 hour   Intake 420 ml    Output 900 ml   Net -480 ml       Laboratory  Recent Labs     12/18/19  0613   WBC 7.1   RBC 4.12*   HEMOGLOBIN 12.4*   HEMATOCRIT 38.3*   MCV 93.0   MCH 30.1   MCHC 32.4*   RDW 45.1   PLATELETCT 271   MPV 11.9     Recent Labs     12/18/19  0613   SODIUM 136   POTASSIUM 4.1   CHLORIDE 102   CO2 24   GLUCOSE 118*   BUN 25*   CREATININE 1.15   CALCIUM 8.4*                   Imaging  DX-FEMUR-2+ LEFT   Final Result      Proximal left femur fracture status post ORIF in anatomic alignment.      DX-FEMUR-2+ LEFT   Final Result      Intraoperative evaluation of femur fracture fixation.      DX-PORTABLE FLUORO > 1 HOUR   Final Result      Intraoperative evaluation of femur fracture fixation.      DX-CHEST-PORTABLE (1 VIEW)   Final Result      No acute cardiopulmonary abnormality. No displaced rib fractures. No pneumothorax.      DX-HIP-COMPLETE - UNILATERAL 2+ LEFT   Final Result      1.  Mild/moderately displaced subtrochanteric proximal left femur fracture.   2.  Degenerative changes of the hips.      DX-FEMUR-1 VIEW LEFT   Final Result      Acute, obliquely oriented fracture of subtrochanteric femoral diaphysis.           Assessment/Plan  * Femur fracture (HCC)  Assessment & Plan  Left sided subtrochanteric femur fracture   Cont with pain control   Pt/ot eval as appropriate  SNF to [place]   Family willing to provide transportation   Ok to provide pain medications if pt is in significant pain   12/5- primary care in [place] will try to arrange SNF . Continue PT/OT whenever possible while inpatient     12/10 encourage activity, pain control  Appears comfortable  To follow-up with [facility] nursing facility, patient states his friend Erik can help with transport    12/11 requiring 1 person + assist for transfers  Plan for transfer to [place], when accepted    12/12 staples removed, PT OT with min max assist  Pending transfer to skilled nursing facility in [place]  We will need to coordinate with family regarding  transfer   assisting    12/14 improving activity tolerance  Family to assist, running to pick patient up if able to provide appropriate level of assistance at home  Versus transfer to skilled nursing facility in [place]    12/15 intermittent pain, postop day #17  Encourage activity  Family to be educated regarding therapy needs and assistance  Needs 24/7 supervision  On oxycodone  Add Motrin as needed  Has lidocaine patch    12/16 postop day #18  Pain controlled  Family planning to come to  patient, therapy to educate family regarding assistance needs   assisting either discharged home with family and 24/7 supervision or transfer to skilled nursing facility in [place] when bed available  12/17- pending transfer to [place]. Pain reasonable well controled. Pt/ot following       Agitation  Assessment & Plan  12/12 intermittent agitation with aggression  Memory loss  We will start trial of Seroquel, limit for sedation    12/14 behavior appropriate  Continue Seroquel    12/17- only at bedtime seroquel. To monitor with primary care or geriatric in [place].     Urinary retention  Assessment & Plan  On shah.       12/14 attempt catheter removal, bladder scan protocol  No signs of UTI      12/15 retention of over 500 mL's this a.m., straight cath  If recurrent, will replace Shah catheter  Encourage urination  Continue Proscar and Flomax  History of retention, follows up with urologist in [place]  May need discharge with catheter in place    12/16 recurrent retention, Shah catheter replaced  Continue Proscar and Flomax    Acute blood loss anemia- (present on admission)  Assessment & Plan  Hemoglobin 10.1-11  Baseline hemoglobin 14  Related to surgery  Follow CBC closely  Transfuse as needed with target hemoglobin above 7  12/5-remain stable. Slight improved   12/6- remain stable     Marijuana use, continuous- (present on admission)  Assessment & Plan  Daily use     BPH (benign prostatic  hyperplasia)  Assessment & Plan  Continue tamsulosin and finasteride   Continue shah   He will need to follow up with urology in [place]     Prediabetes  Assessment & Plan  Pt has no diabetes. He has prediabetes. a1c 5.7   He is 78 y/o. no need for metformin which has more side effects instead of benefits   Discontinue accu-check  Diet controled       Essential hypertension  Assessment & Plan  Unknown home medications at this time \  BP stable  Prn hydralazine ordered   Doing well without med.   Continue to monitor        VTE prophylaxis: lovenox

## 2019-12-18 NOTE — CARE PLAN
Problem: Safety  Goal: Will remain free from injury  Outcome: PROGRESSING AS EXPECTED  Goal: Will remain free from falls  Outcome: PROGRESSING AS EXPECTED  Note:   Bed locked in lowest position with two side rails up and the bed alarm on     Problem: Discharge Barriers/Planning  Goal: Patient's continuum of care needs will be met  Outcome: PROGRESSING AS EXPECTED  Note:   Waiting for transport so patient can discharge     Problem: Skin Integrity  Goal: Risk for impaired skin integrity will decrease  Outcome: PROGRESSING AS EXPECTED

## 2019-12-18 NOTE — CARE PLAN
Problem: Pain Management  Goal: Pain level will decrease to patient's comfort goal  Outcome: PROGRESSING AS EXPECTED  Patient reports little to no pain using 0-10 scale     Problem: Psychosocial Needs:  Goal: Level of anxiety will decrease  Outcome: PROGRESSING AS EXPECTED  Patient has been able to sleep through the night mostly uninterrupted with little intervention needed.     Problem: Discharge Barriers/Planning  Goal: Patient's continuum of care needs will be met  Outcome: PROGRESSING SLOWER THAN EXPECTED  Patient's family wishes to take patient back to [place]. Originally the plan was for him to be picked up the latest 12-, but now it is being delayed by 3-4 days depending on when family can get here. The plan is for the family to  the patient, transport him to [place] and place him into a facility close to family members.     Problem: Urinary Elimination:  Goal: Ability to reestablish a normal urinary elimination pattern will improve  Outcome: PROGRESSING SLOWER THAN EXPECTED  Patient is still suffering from urinary retention and has a shah catheter in place.

## 2019-12-18 NOTE — PROGRESS NOTES
Active ROM of left hip good  Incisions healing  Has multiple other non-hip-related complaints  WBAT  D/c planning

## 2019-12-19 PROCEDURE — 700101 HCHG RX REV CODE 250: Performed by: INTERNAL MEDICINE

## 2019-12-19 PROCEDURE — A9270 NON-COVERED ITEM OR SERVICE: HCPCS | Performed by: PHYSICIAN ASSISTANT

## 2019-12-19 PROCEDURE — 770006 HCHG ROOM/CARE - MED/SURG/GYN SEMI*

## 2019-12-19 PROCEDURE — 92526 ORAL FUNCTION THERAPY: CPT

## 2019-12-19 PROCEDURE — 700102 HCHG RX REV CODE 250 W/ 637 OVERRIDE(OP): Performed by: PHYSICIAN ASSISTANT

## 2019-12-19 PROCEDURE — 700111 HCHG RX REV CODE 636 W/ 250 OVERRIDE (IP): Performed by: ORTHOPAEDIC SURGERY

## 2019-12-19 PROCEDURE — 700102 HCHG RX REV CODE 250 W/ 637 OVERRIDE(OP): Performed by: HOSPITALIST

## 2019-12-19 PROCEDURE — A9270 NON-COVERED ITEM OR SERVICE: HCPCS | Performed by: INTERNAL MEDICINE

## 2019-12-19 PROCEDURE — 99231 SBSQ HOSP IP/OBS SF/LOW 25: CPT | Performed by: INTERNAL MEDICINE

## 2019-12-19 PROCEDURE — A9270 NON-COVERED ITEM OR SERVICE: HCPCS | Performed by: HOSPITALIST

## 2019-12-19 PROCEDURE — 700102 HCHG RX REV CODE 250 W/ 637 OVERRIDE(OP): Performed by: INTERNAL MEDICINE

## 2019-12-19 RX ORDER — TRAZODONE HYDROCHLORIDE 50 MG/1
50 TABLET ORAL
Qty: 30 TAB | Refills: 3 | Status: SHIPPED | OUTPATIENT
Start: 2019-12-19

## 2019-12-19 RX ORDER — TRAMADOL HYDROCHLORIDE 50 MG/1
50-100 TABLET ORAL EVERY 6 HOURS PRN
Qty: 15 TAB | Refills: 0 | Status: SHIPPED | OUTPATIENT
Start: 2019-12-19 | End: 2019-12-24

## 2019-12-19 RX ORDER — CYCLOBENZAPRINE HCL 5 MG
5 TABLET ORAL 3 TIMES DAILY PRN
Qty: 30 TAB | Refills: 0 | Status: SHIPPED | OUTPATIENT
Start: 2019-12-19

## 2019-12-19 RX ADMIN — NICOTINE TRANSDERMAL SYSTEM 21 MG: 21 PATCH, EXTENDED RELEASE TRANSDERMAL at 05:43

## 2019-12-19 RX ADMIN — TRAZODONE HYDROCHLORIDE 50 MG: 50 TABLET ORAL at 21:04

## 2019-12-19 RX ADMIN — LIDOCAINE 1 PATCH: 50 PATCH TOPICAL at 17:20

## 2019-12-19 RX ADMIN — CYCLOBENZAPRINE HYDROCHLORIDE 5 MG: 10 TABLET, FILM COATED ORAL at 21:04

## 2019-12-19 RX ADMIN — TAMSULOSIN HYDROCHLORIDE 0.4 MG: 0.4 CAPSULE ORAL at 09:32

## 2019-12-19 RX ADMIN — SENNOSIDES AND DOCUSATE SODIUM 2 TABLET: 8.6; 5 TABLET ORAL at 05:43

## 2019-12-19 RX ADMIN — FINASTERIDE 5 MG: 5 TABLET, FILM COATED ORAL at 05:43

## 2019-12-19 RX ADMIN — CYCLOBENZAPRINE HYDROCHLORIDE 5 MG: 10 TABLET, FILM COATED ORAL at 03:25

## 2019-12-19 RX ADMIN — ENOXAPARIN SODIUM 40 MG: 100 INJECTION SUBCUTANEOUS at 05:43

## 2019-12-19 RX ADMIN — POLYETHYLENE GLYCOL 3350 1 PACKET: 17 POWDER, FOR SOLUTION ORAL at 09:41

## 2019-12-19 RX ADMIN — MAGNESIUM HYDROXIDE 30 ML: 400 SUSPENSION ORAL at 09:41

## 2019-12-19 ASSESSMENT — ENCOUNTER SYMPTOMS
SENSORY CHANGE: 0
HEARTBURN: 0
SPUTUM PRODUCTION: 0
FOCAL WEAKNESS: 0
HEADACHES: 0
PALPITATIONS: 0
MEMORY LOSS: 1
DIAPHORESIS: 0
ABDOMINAL PAIN: 0
FEVER: 0
MYALGIAS: 1

## 2019-12-19 NOTE — CARE PLAN
Problem: Safety  Goal: Will remain free from injury  12/19/2019 1122 by Jojo Leary, R.N.  Outcome: PROGRESSING AS EXPECTED  Goal: Will remain free from falls  Outcome: PROGRESSING AS EXPECTED     Problem: Venous Thromboembolism (VTW)/Deep Vein Thrombosis (DVT) Prevention:  Goal: Patient will participate in Venous Thrombosis (VTE)/Deep Vein Thrombosis (DVT)Prevention Measures  Outcome: PROGRESSING AS EXPECTED

## 2019-12-19 NOTE — DISCHARGE PLANNING
Spoke to Nazario De La Torre) who states pts friend (Miguel) is coming from [place] to  pt this evening, does not know exactly where Miguel is coming from in [place], however, will call and give an ETA when he knows.

## 2019-12-19 NOTE — DISCHARGE PLANNING
Anticipated Discharge Disposition: Home    Action: Spoke to pt son Sim. Sim states that his brother and pt's friend will be coming either Friday 12/20 or Saturday 12/21 to  pt. Sim states that they have been trying to get the proper transportation set up because of the long journey, and possible weather/road related issues.    Barriers to Discharge: transportation    Plan: f/u with medical team, pt, pt family

## 2019-12-19 NOTE — THERAPY
"Speech Language Therapy dysphagia treatment completed.     Functional Status: Pt was seen today for f/u dysphagia tx session. Per RN, Pt has been tolerating current diet of Dental soft textures, thin liquids with no observed swallowing difficulties. Upon arrival to Pt's room, Pt was found sitting upright in a chair. Pt denied recent coughing, choking and/or other clinical s/sx of aspiration/penetration with current diet order. Pt was provided with PO trials of thins (cup sips), and soft/mixed solids.     Pt demonstrated appropriate feeding rate and bolus size independently. Pt chewed and swallowed prior to talking, which is an improvement from last session. Pt with no oral residue, c/o globus sensation and/or other swallowing difficulties. At this time, recommend for Pt to con't on current diet order of Regular (dental soft) solids, thin liquids and meds with liquid wash. Pt verbalized understanding. RN aware. No further inpatient skilled SLP services appear warranted for dysphagia; however, SLP will remain available PRN for dysphagia education.     Note: Please hold PO if any coughing, choking and/or change in status. Thank you.    Recommendations: At this time, recommend for Pt to con't on current diet order of Regular (dental soft) solids, thin liquids and meds with liquid wash     Plan of Care: Patient is currently not being actively followed for therapy services at this time, however may be seen if requested by attending provider for 1 more visit within 30 days to address any discharge needs or if the patient has a change in status.      Post-Acute Therapy: Anticipate that the patient will have no further speech therapy needs after discharge from the hospital.    See \"Rehab Therapy-Acute\" Patient Summary Report for complete documentation.     "

## 2019-12-19 NOTE — CARE PLAN
Problem: Safety  Goal: Will remain free from injury  Outcome: PROGRESSING AS EXPECTED  Bed alarm in place and on and universal fall precautions in place     Problem: Infection  Goal: Will remain free from infection  Outcome: PROGRESSING AS EXPECTED     Problem: Venous Thromboembolism (VTW)/Deep Vein Thrombosis (DVT) Prevention:  Goal: Patient will participate in Venous Thrombosis (VTE)/Deep Vein Thrombosis (DVT)Prevention Measures  Outcome: PROGRESSING AS EXPECTED  Lovenox in use     Problem: Skin Integrity  Goal: Risk for impaired skin integrity will decrease  Outcome: PROGRESSING AS EXPECTED  Mepilex, pillow support, waffle mattress overlay  and skin care     Problem: Urinary Elimination:  Goal: Ability to reestablish a normal urinary elimination pattern will improve  Outcome: PROGRESSING AS EXPECTED  Voiding in shah catheter

## 2019-12-19 NOTE — CARE PLAN
Problem: Nutritional:  Goal: Achieve adequate nutritional intake  Description  Patient will consume ~50% of meals    Outcome: PROGRESSING SLOWER THAN EXPECTED    Pt has variable PO intake per ADLs, eating mostly 25-50% of meals. Pt states is eating 25% of meals and when asked if he is eating snacks in place, states appetite is not great.    RD to follow.

## 2019-12-20 VITALS
BODY MASS INDEX: 25.26 KG/M2 | SYSTOLIC BLOOD PRESSURE: 107 MMHG | WEIGHT: 160.94 LBS | HEART RATE: 64 BPM | OXYGEN SATURATION: 92 % | DIASTOLIC BLOOD PRESSURE: 74 MMHG | TEMPERATURE: 96.8 F | HEIGHT: 67 IN | RESPIRATION RATE: 18 BRPM

## 2019-12-20 PROCEDURE — 700102 HCHG RX REV CODE 250 W/ 637 OVERRIDE(OP): Performed by: INTERNAL MEDICINE

## 2019-12-20 PROCEDURE — A4357 BEDSIDE DRAINAGE BAG: HCPCS | Performed by: INTERNAL MEDICINE

## 2019-12-20 PROCEDURE — A9270 NON-COVERED ITEM OR SERVICE: HCPCS | Performed by: PHYSICIAN ASSISTANT

## 2019-12-20 PROCEDURE — 700102 HCHG RX REV CODE 250 W/ 637 OVERRIDE(OP): Performed by: PHYSICIAN ASSISTANT

## 2019-12-20 PROCEDURE — 700102 HCHG RX REV CODE 250 W/ 637 OVERRIDE(OP): Performed by: HOSPITALIST

## 2019-12-20 PROCEDURE — 700111 HCHG RX REV CODE 636 W/ 250 OVERRIDE (IP): Performed by: ORTHOPAEDIC SURGERY

## 2019-12-20 PROCEDURE — A9270 NON-COVERED ITEM OR SERVICE: HCPCS | Performed by: INTERNAL MEDICINE

## 2019-12-20 PROCEDURE — A9270 NON-COVERED ITEM OR SERVICE: HCPCS | Performed by: HOSPITALIST

## 2019-12-20 RX ADMIN — TAMSULOSIN HYDROCHLORIDE 0.4 MG: 0.4 CAPSULE ORAL at 08:17

## 2019-12-20 RX ADMIN — SENNOSIDES AND DOCUSATE SODIUM 2 TABLET: 8.6; 5 TABLET ORAL at 06:50

## 2019-12-20 RX ADMIN — NICOTINE TRANSDERMAL SYSTEM 21 MG: 21 PATCH, EXTENDED RELEASE TRANSDERMAL at 05:13

## 2019-12-20 RX ADMIN — CYCLOBENZAPRINE HYDROCHLORIDE 5 MG: 10 TABLET, FILM COATED ORAL at 08:17

## 2019-12-20 RX ADMIN — FINASTERIDE 5 MG: 5 TABLET, FILM COATED ORAL at 06:49

## 2019-12-20 RX ADMIN — ENOXAPARIN SODIUM 40 MG: 100 INJECTION SUBCUTANEOUS at 06:48

## 2019-12-20 NOTE — PROGRESS NOTES
Patient was kept overnight due to transportation lack.  His friend arrived early morning today and patient left in stable condition with previous discharge instruction from overnight.  I did review nursing notes, vitals and labs and no events overnight.  Patient was stable for discharge based on chart review  When I went to evaluate patient he had already left with his friend.   Natasha Hanna M.D.

## 2019-12-20 NOTE — PROGRESS NOTES
Reviewed discharge instructions and prescriptions with patient, patient verbalized understanding. Educated patient on importance of walker use while ambulating, patient refused walker despite education.     Walked down to pharmacy to obtain patient's home medications. Notified by CNA that patient had left without escort with friend. Notified security, found patient in [facility], delivered home medications to patient.

## 2019-12-20 NOTE — DISCHARGE INSTRUCTIONS
Discharge Instructions    Discharged to home by car with relative. Discharged via wheelchair, hospital escort: Yes.  Special equipment needed: Walker    Be sure to schedule a follow-up appointment with your primary care doctor or any specialists as instructed.     Discharge Plan:   Influenza Vaccine Indication: Not indicated: Previously immunized this influenza season and > 8 years of age    I understand that a diet low in cholesterol, fat, and sodium is recommended for good health. Unless I have been given specific instructions below for another diet, I accept this instruction as my diet prescription.   Other diet: Diabetic, Renal diet    Special Instructions: Discharge instructions for the Orthopedic Patient    Follow up with Primary Care Physician within 2 weeks of discharge to home, regarding:  Review of medications and diagnostic testing.  Surveillance for medical complications.  Workup and treatment of osteoporosis, if appropriate.     -Is this a Joint Replacement patient? No    -Is this patient being discharged with medication to prevent blood clots?  No    · Is patient discharged on Warfarin / Coumadin?   No       Hip Fracture  A hip fracture is a fracture of the upper part of your thigh bone (femur).  What are the causes?  A hip fracture is caused by a direct blow to the side of your hip. This is usually the result of a fall but can occur in other circumstances, such as an automobile accident.  What increases the risk?  There is an increased risk of hip fractures in people with:  · An unsteady walking pattern (gait) and those with conditions that contribute to poor balance, such as Parkinson's disease or dementia.  · Osteopenia and osteoporosis.  · Cancer that spreads to the leg bones.  · Certain metabolic diseases.  What are the signs or symptoms?  Symptoms of hip fracture include:  · Pain over the injured hip.  · Inability to put weight on the leg in which the fracture occurred (although, some patients  are able to walk after a hip fracture).  · Toes and foot of the affected leg point outward when you lie down.  How is this diagnosed?  A physical exam can determine if a hip fracture is likely to have occurred. X-ray exams are needed to confirm the fracture and to look for other injuries. The X-ray exam can help to determine the type of hip fracture. Rarely, the fracture is not visible on an X-ray image and a CT scan or MRI will have to be done.  How is this treated?  The treatment for a fracture is usually surgery. This means using a screw, nail, or ankit to hold the bones in place.  Follow these instructions at home:  Take all medicines as directed by your health care provider.  Contact a health care provider if:  Pain continues, even after taking pain medicine.  This information is not intended to replace advice given to you by your health care provider. Make sure you discuss any questions you have with your health care provider.  Document Released: 12/18/2006 Document Revised: 05/25/2017 Document Reviewed: 07/30/2014  [employer] Interactive Patient Education © 2017 [employer]    Garibay Catheter Care, Adult  A Garibay catheter is a soft, flexible tube. This tube is placed into your bladder to drain pee (urine). If you go home with this catheter in place, follow the instructions below.  TAKING CARE OF THE CATHETER  1. Wash your hands with soap and water.  2. Put soap and water on a clean washcloth.  ¨ Clean the skin where the tube goes into your body.  § Clean away from the tube site.  § Never wipe toward the tube.  § Clean the area using a circular motion.  ¨ Remove all the soap. Pat the area dry with a clean towel. For males, reposition the skin that covers the end of the penis (foreskin).  3. Attach the tube to your leg with tape or a leg strap. Do not stretch the tube tight. If you are using tape, remove any stickiness left behind by past tape you used.  4. Keep the drainage bag below your hips. Keep it off the  floor.  5. Check your tube during the day. Make sure it is working and draining. Make sure the tube does not curl, twist, or bend.  6. Do not pull on the tube or try to take it out.  TAKING CARE OF THE DRAINAGE BAGS  You will have a large overnight drainage bag and a small leg bag. You may wear the overnight bag any time. Never wear the small bag at night. Follow the directions below.  Emptying the Drainage Bag  Empty your drainage bag when it is  -½ full or at least 2-3 times a day.  1. Wash your hands with soap and water.  2. Keep the drainage bag below your hips.  3. Hold the dirty bag over the toilet or clean container.  4. Open the pour spout at the bottom of the bag. Empty the pee into the toilet or container. Do not let the pour spout touch anything.  5. Clean the pour spout with a gauze pad or cotton ball that has rubbing alcohol on it.  6. Close the pour spout.  7. Attach the bag to your leg with tape or a leg strap.  8. Wash your hands well.  Changing the Drainage Bag  Change your bag once a month or sooner if it starts to smell or look dirty.   1. Wash your hands with soap and water.  2. Pinch the rubber tube so that pee does not spill out.  3. Disconnect the catheter tube from the drainage tube at the connection valve. Do not let the tubes touch anything.  4. Clean the end of the catheter tube with an alcohol wipe. Clean the end of a the drainage tube with a different alcohol wipe.  5. Connect the catheter tube to the drainage tube of the clean drainage bag.  6. Attach the new bag to the leg with tape or a leg strap. Avoid attaching the new bag too tightly.  7. Wash your hands well.  Cleaning the Drainage Bag  2. Wash your hands with soap and water.  3. Wash the bag in warm, soapy water.  4. Rinse the bag with warm water.  5. Fill the bag with a mixture of white vinegar and water (1 cup vinegar to 1 quart warm water [.2 liter vinegar to 1 liter warm water]). Close the bag and soak it for 30 minutes in  the solution.  6. Rinse the bag with warm water.  7. Hang the bag to dry with the pour spout open and hanging downward.  8. Store the clean bag (once it is dry) in a clean plastic bag.  9. Wash your hands well.  PREVENT INFECTION  · Wash your hands before and after touching your tube.  · Take showers every day. Wash the skin where the tube enters your body. Do not take baths. Replace wet leg straps with dry ones, if this applies.  · Do not use powders, sprays, or lotions on the genital area. Only use creams, lotions, or ointments as told by your doctor.  · For females, wipe from front to back after going to the bathroom.  · Drink enough fluids to keep your pee clear or pale yellow unless you are told not to have too much fluid (fluid restriction).  · Do not let the drainage bag or tubing touch or lie on the floor.  · Wear cotton underwear to keep the area dry.  GET HELP IF:  · Your pee is cloudy or smells unusually bad.  · Your tube becomes clogged.  · You are not draining pee into the bag or your bladder feels full.  · Your tube starts to leak.  GET HELP RIGHT AWAY IF:  · You have pain, puffiness (swelling), redness, or yellowish-white fluid (pus) where the tube enters the body.  · You have pain in the belly (abdomen), legs, lower back, or bladder.  · You have a fever.  · You see blood fill the tube, or your pee is pink or red.  · You feel sick to your stomach (nauseous), throw up (vomit), or have chills.  · Your tube gets pulled out.  MAKE SURE YOU:   · Understand these instructions.  · Will watch your condition.  · Will get help right away if you are not doing well or get worse.     This information is not intended to replace advice given to you by your health care provider. Make sure you discuss any questions you have with your health care provider.     Document Released: 04/14/2014 Document Revised: 01/08/2016 Document Reviewed: 04/14/2014  [employer] Interactive Patient Education ©2016 [employer]      Depression / Suicide Risk    As you are discharged from this [facility] facility, it is important to learn how to keep safe from harming yourself.    Recognize the warning signs:  · Abrupt changes in personality, positive or negative- including increase in energy   · Giving away possessions  · Change in eating patterns- significant weight changes-  positive or negative  · Change in sleeping patterns- unable to sleep or sleeping all the time   · Unwillingness or inability to communicate  · Depression  · Unusual sadness, discouragement and loneliness  · Talk of wanting to die  · Neglect of personal appearance   · Rebelliousness- reckless behavior  · Withdrawal from people/activities they love  · Confusion- inability to concentrate     If you or a loved one observes any of these behaviors or has concerns about self-harm, here's what you can do:  · Talk about it- your feelings and reasons for harming yourself  · Remove any means that you might use to hurt yourself (examples: pills, rope, extension cords, firearm)  · Get professional help from the community (Mental Health, Substance Abuse, psychological counseling)  · Do not be alone:Call your Safe Contact- someone whom you trust who will be there for you.  · Call your local CRISIS HOTLINE 867-3077 or 238-732-1452  · Call your local Children's Mobile Crisis Response Team [place] (136) 579-8037 or www.[employer]  · Call the toll free National Suicide Prevention Hotlines   · National Suicide Prevention Lifeline 057-632-UHWM (7425)  · National Hope Line Network 800-SUICIDE (094-2997)      Discharge Instructions per Natasha Hanna M.D.  Continue prescription as instructed.  Follow-up with primary care within 2 weeks  Follow-up with urologist within a week  Follow-up with orthopedic doctor within a week  Avoid smoking marijuana if taking pain medication    DIET: Healthy    ACTIVITY: Weightbearing as tolerated  DIAGNOSIS: Femur fracture    Return to ER if  worsening mentation, fever, chest pain, shortness of breath, leg swelling

## 2019-12-20 NOTE — DISCHARGE SUMMARY
Discharge Summary    CHIEF COMPLAINT ON ADMISSION  Chief Complaint   Patient presents with   • Hip Injury     c/o left hip pain   • T-5000 GLF     patient slipped on ice and landed on left hip       Reason for Admission  Fall- Leg pain     Admission Date  11/27/2019    CODE STATUS  Full Code    HPI & HOSPITAL COURSE     79-year-old male past medical history of hypertension, diabetes presenting with ground-level fall, left hip pain.  He was getting out of his car when he slipped on ice and landed on his left hip.  He was unable to bear weight on his left side with significant amount of pain.  Please refer to H&P with Dr. Pacheco for further details. Xr showed Mild/moderately displaced subtrochanteric proximal left femur fracture. He was taken to OR 11/28, by Dr. Barboza and he did have Intramedullary nailing of left femur. He was being more confused 12/2 post surgery. Narcotic were held. His mentation improved. While inpatient PT/OT continuined to visits pt. disposition was difficult since pt lives in [facility]. While he has been in hospital he had progressed. Pt feels that he has great support in [place]. Pt will be discharge on shah and close follow up with primary care, urology and orthopedic. He has great support at home and settled medical care at [place].       Therefore, he is discharged in guarded and stable condition to home with close outpatient follow-up.    The patient met 2-midnight criteria for an inpatient stay at the time of discharge.    Discharge Date  12/19/2019     FOLLOW UP ITEMS POST DISCHARGE  None     DISCHARGE DIAGNOSES  Principal Problem:    Femur fracture (HCC) POA: Yes  Active Problems:    Essential hypertension POA: Yes    Prediabetes POA: Yes    BPH (benign prostatic hyperplasia) POA: Yes    Marijuana use, continuous POA: Yes    Acute blood loss anemia POA: Yes    Urinary retention POA: Yes    Agitation POA: Yes  Resolved Problems:    Thrombocytopenia (HCC) POA: Unknown     Encephalopathy acute POA: Unknown    LADARIUS (acute kidney injury) ([facility]) POA: Unknown    Acute bacterial conjunctivitis of right eye POA: Unknown      FOLLOW UP  No future appointments.  No follow-up provider specified.    MEDICATIONS ON DISCHARGE     Medication List      START taking these medications      Instructions   cyclobenzaprine 5 MG tablet  Commonly known as:  FLEXERIL   Take 1 Tab by mouth 3 times a day as needed (Pain management).  Dose:  5 mg     tramadol 50 MG Tabs  Commonly known as:  ULTRAM   Take 1-2 Tabs by mouth every 6 hours as needed for up to 5 days.  Dose:   mg     traZODone 50 MG Tabs  Commonly known as:  DESYREL   Take 1 Tab by mouth every bedtime.  Dose:  50 mg        CONTINUE taking these medications      Instructions   finasteride 5 MG Tabs  Commonly known as:  PROSCAR   Take 5 mg by mouth every day.  Dose:  5 mg     gabapentin 300 MG Caps  Commonly known as:  NEURONTIN   Take 900 mg by mouth every evening.  Dose:  900 mg     tamsulosin 0.4 MG capsule  Commonly known as:  FLOMAX   Take 0.4 mg by mouth ONE-HALF HOUR AFTER BREAKFAST.  Dose:  0.4 mg     vitamin D 2000 UNIT Tabs   Take 2,000 Units by mouth every morning.  Dose:  2,000 Units        STOP taking these medications    doxazosin 4 MG Tabs  Commonly known as:  CARDURA     HYDROcodone-acetaminophen 5-325 MG Tabs per tablet  Commonly known as:  NORCO     metFORMIN 500 MG Tabs  Commonly known as:  GLUCOPHAGE     VITAMIN E PO            Allergies  No Known Allergies    DIET  Orders Placed This Encounter   Procedures   • Diet Order Diabetic (Dental soft)     Standing Status:   Standing     Number of Occurrences:   1     Order Specific Question:   Diet:     Answer:   Diabetic [3]     Comments:   Dental soft     Order Specific Question:   Miscellaneous modifications:     Answer:   SLP - Deliver to Nursing Station [22]     Comments:   supervise first meal (12/16/19) please       ACTIVITY  As tolerated.  Weight bearing as  tolerated    CONSULTATIONS  Ortho     PROCEDURES  Intramedullary nailing of left femur.    LABORATORY  Lab Results   Component Value Date    SODIUM 136 12/18/2019    POTASSIUM 4.1 12/18/2019    CHLORIDE 102 12/18/2019    CO2 24 12/18/2019    GLUCOSE 118 (H) 12/18/2019    BUN 25 (H) 12/18/2019    CREATININE 1.15 12/18/2019        Lab Results   Component Value Date    WBC 7.1 12/18/2019    HEMOGLOBIN 12.4 (L) 12/18/2019    HEMATOCRIT 38.3 (L) 12/18/2019    PLATELETCT 271 12/18/2019        Total time of the discharge process exceeds 35 minutes.

## 2019-12-20 NOTE — CARE PLAN
Problem: Safety  Goal: Will remain free from injury  Outcome: PROGRESSING AS EXPECTED  Goal: Will remain free from falls  Outcome: PROGRESSING AS EXPECTED  Bed alarm in place and on and universal fall precautions in place    Problem: Venous Thromboembolism (VTW)/Deep Vein Thrombosis (DVT) Prevention:  Goal: Patient will participate in Venous Thrombosis (VTE)/Deep Vein Thrombosis (DVT)Prevention Measures  Outcome: PROGRESSING AS EXPECTED  Lovenox in use.     Problem: Skin Integrity  Goal: Risk for impaired skin integrity will decrease  Outcome: PROGRESSING AS EXPECTED  Mepilex, pillow support, waffle mattress overlay, and q shift skin assessment     Problem: Urinary Elimination:  Goal: Ability to reestablish a normal urinary elimination pattern will improve  Outcome: PROGRESSING AS EXPECTED  Voiding in shah catheter.

## 2019-12-20 NOTE — PROGRESS NOTES
Hospital Medicine Daily Progress Note    Date of Service  12/19/2019    Chief Complaint  79 y.o. male admitted 11/27/2019 with ground level fall, hip pain     Hospital Course    79-year-old male past medical history of hypertension, diabetes presenting with ground-level fall, left hip pain.  He was getting out of his car when he slipped on ice and landed on his left hip.  He was unable to bear weight on his left side with significant amount of pain.  Please refer to H&P with Dr. Pacheco for further details. Xr showed Mild/moderately displaced subtrochanteric proximal left femur fracture. He was taken to [place] 11/28, by Dr. Barboza and he did have Intramedullary nailing of left femur. He was being more confused 12/2. Narcotic were held. Vitals stable. Lab work unremarkable. 12/4 noted LADARIUS. IF fluid started. Mentation improved. Pt continued to have urinary retention, besides trial of discontinuing shah. LADARIUS resolved with IVF and shah placement. While inpatient PT/OT continuined to visits pt. disposition was difficult since pt lives in [facility].           Interval Problem Update  12/17- somnolent today. Pt on seroquel for intermittent agitation.  I will when of it. Only at bedtime. In the mean time, I will start trazodone and goal is to discontinue seroquel at some point. Per chart review pt has intermittent confusion which is likely due to his age and mild cognitive decline.   12/18- feeling better today. Some muscle spasm. He tells me that he takes muscle relaxant three times a day. Order in place. Avoid narcotic, since there are times of confusion. Pending transportation from family. If family not able to  pt, next step is SNF.   12/19- feeling well. Would like to go home. He believes he has great support there. He has his own doctors. Pain well managed. He will be dc on shah and follow up with urology at [place]     Consultants/Specialty  Ortho     Code Status  Full code     Disposition  Inpatient   Home  with 24/7 support by family versus skilled nursing facility placement in [place],  assisting    Review of Systems  Review of Systems   Constitutional: Negative for diaphoresis, fever and malaise/fatigue.   Respiratory: Negative for sputum production.    Cardiovascular: Negative for palpitations and leg swelling.   Gastrointestinal: Negative for abdominal pain and heartburn.   Genitourinary: Negative for hematuria.        Trouble voiding    Musculoskeletal: Positive for myalgias. Negative for joint pain.   Neurological: Negative for sensory change, focal weakness and headaches.   Psychiatric/Behavioral: Positive for memory loss.        Physical Exam  Temp:  [36.1 °C (97 °F)-36.9 °C (98.4 °F)] 36.4 °C (97.5 °F)  Pulse:  [66-72] 66  Resp:  [16-17] 16  BP: (100-125)/(55-82) 100/55  SpO2:  [93 %-99 %] 99 %    Physical Exam  Vitals signs and nursing note reviewed.   Constitutional:       General: He is not in acute distress.     Appearance: He is not ill-appearing or diaphoretic.   HENT:      Head: Normocephalic and atraumatic.      Right Ear: External ear normal.      Left Ear: External ear normal.   Eyes:      Pupils: Pupils are equal, round, and reactive to light.   Neck:      Musculoskeletal: Normal range of motion. No muscular tenderness.   Cardiovascular:      Rate and Rhythm: Normal rate and regular rhythm.      Heart sounds: No murmur.   Pulmonary:      Effort: Pulmonary effort is normal. No respiratory distress.   Abdominal:      General: Bowel sounds are normal. There is no distension.      Palpations: Abdomen is soft. There is no mass.   Musculoskeletal:         General: No swelling or tenderness.      Comments: Left leg slight swelling and tenderness    Neurological:      General: No focal deficit present.      Mental Status: He is alert.      Cranial Nerves: No cranial nerve deficit.      Sensory: No sensory deficit.   Psychiatric:         Mood and Affect: Mood normal.         Behavior: Behavior  normal.      Comments: Cooperative with me         Fluids    Intake/Output Summary (Last 24 hours) at 12/19/2019 1634  Last data filed at 12/19/2019 1400  Gross per 24 hour   Intake 1017 ml   Output 2350 ml   Net -1333 ml       Laboratory  Recent Labs     12/18/19  0613   WBC 7.1   RBC 4.12*   HEMOGLOBIN 12.4*   HEMATOCRIT 38.3*   MCV 93.0   MCH 30.1   MCHC 32.4*   RDW 45.1   PLATELETCT 271   MPV 11.9     Recent Labs     12/18/19  0613   SODIUM 136   POTASSIUM 4.1   CHLORIDE 102   CO2 24   GLUCOSE 118*   BUN 25*   CREATININE 1.15   CALCIUM 8.4*                   Imaging  DX-FEMUR-2+ LEFT   Final Result      Proximal left femur fracture status post ORIF in anatomic alignment.      DX-FEMUR-2+ LEFT   Final Result      Intraoperative evaluation of femur fracture fixation.      DX-PORTABLE FLUORO > 1 HOUR   Final Result      Intraoperative evaluation of femur fracture fixation.      DX-CHEST-PORTABLE (1 VIEW)   Final Result      No acute cardiopulmonary abnormality. No displaced rib fractures. No pneumothorax.      DX-HIP-COMPLETE - UNILATERAL 2+ LEFT   Final Result      1.  Mild/moderately displaced subtrochanteric proximal left femur fracture.   2.  Degenerative changes of the hips.      DX-FEMUR-1 VIEW LEFT   Final Result      Acute, obliquely oriented fracture of subtrochanteric femoral diaphysis.           Assessment/Plan  * Femur fracture (HCC)  Assessment & Plan  Left sided subtrochanteric femur fracture   Cont with pain control   Pt/ot eval as appropriate  SNF to [place]   Family willing to provide transportation   Ok to provide pain medications if pt is in significant pain   12/5- primary care in [place] will try to arrange SNF . Continue PT/OT whenever possible while inpatient     12/10 encourage activity, pain control  Appears comfortable  [facility] with [facility], patient states his friend Erik can help with transport    12/11 requiring 1 person + assist for transfers  Plan for  transfer to [place], when accepted    12/12 staples removed, PT OT with min max assist  Pending transfer to skilled nursing facility in [place]  We will need to coordinate with family regarding transfer   assisting    12/14 improving activity tolerance  Family to assist, running to pick patient up if able to provide appropriate level of assistance at home  Versus transfer to skilled nursing facility in [place]    12/15 intermittent pain, postop day #17  Encourage activity  Family to be educated regarding therapy needs and assistance  Needs 24/7 supervision  On oxycodone  Add Motrin as needed  Has lidocaine patch    12/16 postop day #18  Pain controlled  Family planning to come to  patient, therapy to educate family regarding assistance needs   assisting either discharged home with family and 24/7 supervision or transfer to skilled nursing facility in [place] when bed available  12/17- pending transfer to [place]. Pain reasonable well controled. Pt/ot following       Agitation  Assessment & Plan  12/12 intermittent agitation with aggression  Memory loss  We will start trial of Seroquel, limit for sedation    12/14 behavior appropriate  Continue Seroquel    12/17- only at bedtime seroquel. To monitor with primary care or geriatric in [place].     Urinary retention  Assessment & Plan  On shah.       12/14 attempt catheter removal, bladder scan protocol  No signs of UTI      12/15 retention of over 500 mL's this a.m., straight cath  If recurrent, will replace Shah catheter  Encourage urination  Continue Proscar and Flomax  History of retention, follows up with urologist in [place]  May need discharge with catheter in place    12/16 recurrent retention, Shah catheter replaced  Continue Proscar and Flomax    Acute blood loss anemia- (present on admission)  Assessment & Plan  Hemoglobin 10.1-11  Baseline hemoglobin 14  Related to surgery  Follow CBC closely  Transfuse as needed with target  hemoglobin above 7  12/5-remain stable. Slight improved   12/6- remain stable     Marijuana use, continuous- (present on admission)  Assessment & Plan  Daily use     BPH (benign prostatic hyperplasia)  Assessment & Plan  Continue tamsulosin and finasteride   Continue shah   He will need to follow up with urology in [place]     Prediabetes  Assessment & Plan  Pt has no diabetes. He has prediabetes. a1c 5.7   He is 78 y/o. no need for metformin which has more side effects instead of benefits   Discontinue accu-check  Diet controled       Essential hypertension  Assessment & Plan  Unknown home medications at this time \  BP stable  Prn hydralazine ordered   Doing well without med.   Continue to monitor        VTE prophylaxis: lovenox

## 2020-01-30 NOTE — DOCUMENTATION QUERY
[facility]                                                                       Query Response Note      PATIENT:               DAVID NAVA  ACCT #:                  [id number]  MRN:                     [id number]  :                      1940  ADMIT DATE:       2019 5:54 PM  DISCH DATE:        2019 9:52 AM  RESPONDING  PROVIDER #:        [id number]           QUERY TEXT:    Encephalopathy is documented in the Medical Record. Please specify type.    NOTE:  If an appropriate response is not listed below, please respond with a new note.    The patient's Clinical Indicators include:  Encephalopathy likely related to narcotic per PNs  Pt was being more confused /2 post surgery.  Narcotic were held.  Encephalopathy was not specified on the DC summary  Options provided:   -- Due to medications or drugs   -- Metabolic encephalopathy   -- Toxic encephalopathy   -- Hypertensive encephalopathy   -- Other type of encephalopathy   -- Unable to determine      Query created by: Jojo Malin on 2020 12:00 PM    RESPONSE TEXT:    Due to medications or drugs       QUERY TEXT:    There is conflicting documentation in the medical record regarding the diagnosis of diabetes.      Based on treatment, clinical findings and risk factors, can this documentation be further clarified?    The patient's Clinical Indicators include:  Per Dr. Hanna's PN : pt has no diabetes. He has prediabetes, diet controlled.  Per other PNs: diabetes mellitus with associated hyperglycemia and uncontrolled.    Pt with history of diabetes is documented through out the chart including Discharge Summary as well.  Options provided:   -- Pt has a diagnosis of Diabetes   -- Pt has no Diabetes and is Prediabetic only   -- Unable to determine      Query created by: Jojo Malin on 2020 12:00 PM    RESPONSE TEXT:    Pt has no Diabetes and is Prediabetic only           Electronically signed by:  FREDY ECHEVERRIA MD 1/29/2020 5:36 PM

## 2024-01-08 NOTE — PROGRESS NOTES
Pt to floor from PACU, resting in bed, VSS charted, distal pulses palpable, no pain reported at this time   rn

## (undated) DEVICE — PENCIL ELECTSURG 10FT BTN SWH - (50/CA)

## (undated) DEVICE — SENSOR SPO2 NEO LNCS ADHESIVE (20/BX) SEE USER NOTES

## (undated) DEVICE — HEAD HOLDER JUNIOR/ADULT

## (undated) DEVICE — SLEEVE, VASO, THIGH, MED

## (undated) DEVICE — TUBING CLEARLINK DUO-VENT - C-FLO (48EA/CA)

## (undated) DEVICE — SODIUM CHL IRRIGATION 0.9% 1000ML (12EA/CA)

## (undated) DEVICE — GLOVE BIOGEL INDICATOR SZ 8 SURGICAL PF LTX - (50/BX 4BX/CA)

## (undated) DEVICE — GLOVE BIOGEL PI INDICATOR SZ 7.0 SURGICAL PF LF - (50/BX 4BX/CA)

## (undated) DEVICE — SET EXTENSION WITH 2 PORTS (48EA/CA) ***PART #2C8610 IS A SUBSTITUTE*****

## (undated) DEVICE — SET LEADWIRE 5 LEAD BEDSIDE DISPOSABLE ECG (1SET OF 5/EA)

## (undated) DEVICE — MASK ANESTHESIA ADULT  - (100/CA)

## (undated) DEVICE — CANISTER SUCTION 3000ML MECHANICAL FILTER AUTO SHUTOFF MEDI-VAC NONSTERILE LF DISP  (40EA/CA)

## (undated) DEVICE — WIRE GUIDE R-T TRIGEN 3.2MM (1EA)

## (undated) DEVICE — ROD GUIDE R-T TRIGEN 3 X 1000 (1EA)

## (undated) DEVICE — CHLORAPREP 26 ML APPLICATOR - ORANGE TINT(25/CA)

## (undated) DEVICE — DRAPE UNDER BUTTOCKS FLUID - (20/CA)

## (undated) DEVICE — DRAPE SURGICAL U 77X120 - (10/CA)

## (undated) DEVICE — KIT ROOM DECONTAMINATION

## (undated) DEVICE — POUCH FLUID COLLECTION INVISISHIELD - (10/BX)

## (undated) DEVICE — DRAPE U ORTHOPEDIC - (10/BX)

## (undated) DEVICE — GLOVE BIOGEL SZ 8 SURGICAL PF LTX - (50PR/BX 4BX/CA)

## (undated) DEVICE — SUTURE 2-0 VICRYL PLUS CT-1 - 8 X 18 INCH(12/BX)

## (undated) DEVICE — GOWN WARMING STANDARD FLEX - (30/CA)

## (undated) DEVICE — DRESSING TRANSPARENT FILM TEGADERM 4 X 4.75" (50EA/BX)"

## (undated) DEVICE — SUCTION INSTRUMENT YANKAUER BULBOUS TIP W/O VENT (50EA/CA)

## (undated) DEVICE — TUBE E-T HI-LO CUFF 8.0MM (10EA/PK)

## (undated) DEVICE — SUTURE GENERAL

## (undated) DEVICE — BIT DRILL INTERTAN 4.0 SHORT

## (undated) DEVICE — LACTATED RINGERS INJ 1000 ML - (14EA/CA 60CA/PF)

## (undated) DEVICE — KIT ANESTHESIA W/CIRCUIT & 3/LT BAG W/FILTER (20EA/CA)

## (undated) DEVICE — ELECTRODE 850 FOAM ADHESIVE - HYDROGEL RADIOTRNSPRNT (50/PK)

## (undated) DEVICE — PACK MAJOR ORTHO - (2EA/CA)

## (undated) DEVICE — PROTECTOR ULNA NERVE - (36PR/CA)

## (undated) DEVICE — ELECTRODE DUAL RETURN W/ CORD - (50/PK)

## (undated) DEVICE — SUTURE 1 VICRYL PLUS CTX - 8 X 18 INCH (12/BX)

## (undated) DEVICE — NEPTUNE 4 PORT MANIFOLD - (20/PK)

## (undated) DEVICE — GOWN SURGEONS X-LARGE - DISP. (30/CA)

## (undated) DEVICE — GLOVE SZ 6.5 BIOGEL PI MICRO - PF LF (50PR/BX)

## (undated) DEVICE — STAPLER SKIN DISP - (6/BX 10BX/CA) VISISTAT

## (undated) DEVICE — TOWELS CLOTH SURGICAL - (4/PK 20PK/CA)

## (undated) DEVICE — TUBE CONNECT SUCTION CLEAR 120 X 1/4" (50EA/CA)"

## (undated) DEVICE — DRAPE C-ARM LARGE 41IN X 74 IN - (10/BX 2BX/CA)